# Patient Record
Sex: FEMALE | Race: WHITE | URBAN - METROPOLITAN AREA
[De-identification: names, ages, dates, MRNs, and addresses within clinical notes are randomized per-mention and may not be internally consistent; named-entity substitution may affect disease eponyms.]

---

## 2017-01-16 ENCOUNTER — OFFICE VISIT (OUTPATIENT)
Dept: ORTHOPEDICS | Facility: CLINIC | Age: 82
End: 2017-01-16
Payer: COMMERCIAL

## 2017-01-16 VITALS — BODY MASS INDEX: 36.12 KG/M2 | RESPIRATION RATE: 18 BRPM | HEIGHT: 60 IN | WEIGHT: 184 LBS

## 2017-01-16 DIAGNOSIS — M75.112 INCOMPLETE TEAR OF LEFT ROTATOR CUFF: Primary | ICD-10-CM

## 2017-01-16 DIAGNOSIS — M25.812 SHOULDER IMPINGEMENT, LEFT: ICD-10-CM

## 2017-01-16 PROCEDURE — 20610 DRAIN/INJ JOINT/BURSA W/O US: CPT | Mod: LT | Performed by: ORTHOPAEDIC SURGERY

## 2017-01-16 RX ORDER — TRIAMCINOLONE ACETONIDE 40 MG/ML
80 INJECTION, SUSPENSION INTRA-ARTICULAR; INTRAMUSCULAR ONCE
Qty: 2 ML | Refills: 0 | OUTPATIENT
Start: 2017-01-16 | End: 2017-01-16

## 2017-01-16 NOTE — NURSING NOTE
Chief Complaint   Patient presents with     RECHECK     Left shoulder OA last injection 9/19/16.       Initial Resp 18  Ht 1.524 m (5')  Wt 83.462 kg (184 lb)  BMI 35.94 kg/m2 Estimated body mass index is 35.94 kg/(m^2) as calculated from the following:    Height as of this encounter: 1.524 m (5').    Weight as of this encounter: 83.462 kg (184 lb).  BP completed using cuff size: NA (Not Taken)  Aleah Zafar MA

## 2017-01-16 NOTE — PROGRESS NOTES
Follow up left shoulder impingement, partial rotator cuff tear, osteoarthritis..  Patient desires injection today of left shoulder.  Last injection 9/19/16 in subacromial space with 80 mg kenolog.  Risks, benefits, potential complications and alternatives were discussed.   With the patient's consent, sterile prep was performed of left shoulder.  Left shoulder was injected with Kenalog 80 mg and lidocaine at shoulder subacromial space from the posterolateral approach .  Return to clinic as needed.

## 2017-01-16 NOTE — PROGRESS NOTES
The patient's left shoulder was prepped with betadine solution after verification of allergies. Area approximately 10 cm x 10 cm prepped in a sterile fashion. After injection, betadine removed with soap and water and band-aids applied.    1ml kenalog with 1% lidocaine plain injected into patient's left shoulder by Dr. Mitul Segura  LOT# [id number]  Exp.4/18

## 2017-01-16 NOTE — MR AVS SNAPSHOT
"              After Visit Summary   1/16/2017    Kelly Mansfield    MRN: [id number]           Patient Information     Date Of Birth          6/8/1931        Visit Information        Provider Department      1/16/2017 1:00 PM Mitul Segura MD [facility]        Today's Diagnoses     Incomplete tear of left rotator cuff    -  1     Primary osteoarthritis of left shoulder           Care Instructions    You have been given a \"cortisone\" injection today.  There are two medications in the injection you were given.      One of these medications is lidocaine, which is similar to the numbing agent you receive at the dentist.  You should start to feel the effect of this medication starting a few  minutes after you are injected and it will last for about two hours.  After the lidocaine wears off, the area will feel sore and a small percentage of people actually have a slight increase of pain for the first 24-48 hours after the lidocaine wears off.     The other medication is the \"cortisone\" type medication.  This medication takes effect slowly and should make you more comfortable day by day over the next two weeks.  After the first two weeks, the medication levels off and keeps working over about three months or longer.      You may have the area injected, in general, every three months.  This is the estimated amount of time that the body takes to metabolize the \"cortisone.\"  Getting injections too frequently may result in a softening of the cartilage and cause the joint to actually wear out more quickly.    The most important thing for you to note is how the area feels over the next two hours.  If the correct space was injected, the area should feel much more comfortable over the next two hours.  If the area never gets comfortable over this time period, it is possible that the medication was trapped in some surrounding soft tissue and is not free in the joint.  If this happens, the medication will not " "work long-term.  However, if this occurs we can attempt to re-inject the area sooner that the three months that is normally recommended.  If you report to us that the injection was NOT effective, we will ask you if it worked initially.  Please note how the area feels during the next two hours, as we may be inquiring about this.    To schedule another appointment, call [phone].          Follow-ups after your visit        Your next 10 appointments already scheduled     Jan 16, 2017  1:00 PM   Return Visit with Mitul Segura MD   [facility] ([facility])    [address] [place] [id number] [phone]              Who to contact     If you have questions or need follow up information about today's clinic visit or your schedule please contact [facility] directly at [phone].  Normal or non-critical lab and imaging results will be communicated to you by MyChart, letter or phone within 4 business days after the clinic has received the results. If you do not hear from us within 7 days, please contact the clinic through MyChart or phone. If you have a critical or abnormal lab result, we will notify you by phone as soon as possible.  Submit refill requests through [employer] or call your pharmacy and they will forward the refill request to us. Please allow 3 business days for your refill to be completed.          Additional Information About Your Visit        Spinal Restoration[employer] Information     mPortico lets you send messages to your doctor, view your test results, renew your prescriptions, schedule appointments and more. To sign up, go to [email or web address] . Click on \"Log in\" on the left side of the screen, which will take you to the Welcome page. Then click on \"Sign up Now\" on the right side of the page.     You will be asked to enter the access code listed below, as well as some personal information. Please follow the directions to create your username and " password.     Your access code is: [email or web address]  Expires: 3/2/2017 10:34 AM     Your access code will  in 90 days. If you need help or a new code, please call your [facility] clinic or [phone].        Care EveryWhere ID     This is your Care EveryWhere ID. This could be used by other organizations to access your [facility] medical records  [id number]        Your Vitals Were     Respirations Height BMI (Body Mass Index)             18 1.524 m (5') 35.94 kg/m2          Blood Pressure from Last 3 Encounters:   16 126/82   10/17/16 124/78   10/11/16 138/70    Weight from Last 3 Encounters:   17 83.462 kg (184 lb)   16 83.462 kg (184 lb)   16 83.915 kg (185 lb)              Today, you had the following     No orders found for display       Primary Care Provider Office Phone # Fax #    Tatianna Mota [phone]       [address] [address]        Thank you!     Thank you for choosing [facility]  for your care. Our goal is always to provide you with excellent care. Hearing back from our patients is one way we can continue to improve our services. Please take a few minutes to complete the written survey that you may receive in the mail after your visit with us. Thank you!             Your Updated Medication List - Protect others around you: Learn how to safely use, store and throw away your medicines at www.disposemymeds.org.          This list is accurate as of: 17 12:58 PM.  Always use your most recent med list.                   Brand Name Dispense Instructions for use    albuterol 108 (90 BASE) MCG/ACT Inhaler    PROAIR HFA/PROVENTIL HFA/VENTOLIN HFA    1 Inhaler    Inhale 2 puffs into the lungs every 4 hours as needed       aspirin 81 MG tablet      Take by mouth daily       atorvastatin 20 MG tablet    LIPITOR    90 tablet    TAKE ONE TABLET BY MOUTH EVERY DAY (MUST HAVE FASTING LABS FOR FURTHER REFILLS)        CALCIUM 1200 PO      1800mg daily       celecoxib 100 MG capsule    celeBREX    60 capsule    Take 1 capsule (100 mg) by mouth 2 times daily as needed for moderate pain       cholecalciferol 1000 UNIT tablet    vitamin D     1 TABLET DAILY       clopidogrel 75 MG tablet    PLAVIX    90 tablet    Take 1 tablet (75 mg) by mouth daily       fluticasone 50 MCG/ACT spray    FLONASE    16 g    Spray 2 sprays into both nostrils 2 times daily       fluticasone-salmeterol 250-50 MCG/DOSE diskus inhaler    ADVAIR DISKUS    3 Inhaler    Inhale 1 puff into the lungs 2 times daily       lisinopril 5 MG tablet    PRINIVIL/ZESTRIL    90 tablet    Take 1 tablet (5 mg) by mouth daily       OCUVITE PO      6mg daily       polyethylene glycol powder    MIRALAX/GLYCOLAX     Take 1 capful by mouth daily

## 2017-01-16 NOTE — PATIENT INSTRUCTIONS
"You have been given a \"cortisone\" injection today.  There are two medications in the injection you were given.      One of these medications is lidocaine, which is similar to the numbing agent you receive at the dentist.  You should start to feel the effect of this medication starting a few  minutes after you are injected and it will last for about two hours.  After the lidocaine wears off, the area will feel sore and a small percentage of people actually have a slight increase of pain for the first 24-48 hours after the lidocaine wears off.     The other medication is the \"cortisone\" type medication.  This medication takes effect slowly and should make you more comfortable day by day over the next two weeks.  After the first two weeks, the medication levels off and keeps working over about three months or longer.      You may have the area injected, in general, every three months.  This is the estimated amount of time that the body takes to metabolize the \"cortisone.\"  Getting injections too frequently may result in a softening of the cartilage and cause the joint to actually wear out more quickly.    The most important thing for you to note is how the area feels over the next two hours.  If the correct space was injected, the area should feel much more comfortable over the next two hours.  If the area never gets comfortable over this time period, it is possible that the medication was trapped in some surrounding soft tissue and is not free in the joint.  If this happens, the medication will not work long-term.  However, if this occurs we can attempt to re-inject the area sooner that the three months that is normally recommended.  If you report to us that the injection was NOT effective, we will ask you if it worked initially.  Please note how the area feels during the next two hours, as we may be inquiring about this.    To schedule another appointment, call [phone].    "

## 2017-02-06 ENCOUNTER — TELEPHONE (OUTPATIENT)
Dept: FAMILY MEDICINE | Facility: CLINIC | Age: 82
End: 2017-02-06

## 2017-02-06 DIAGNOSIS — J45.30 MILD PERSISTENT ASTHMA WITHOUT COMPLICATION: Primary | ICD-10-CM

## 2017-02-06 NOTE — TELEPHONE ENCOUNTER
Advair is very high priced on the patients insurance and it looks like the preferred products are symbicort or dulera.  If this is okay, would you please send a prescription for either?  Patient does not want to pay over $200 for Advair.    Thank you  Linnea Garcia [facility]  Phone: [phone]  Fax: [phone]

## 2017-02-17 ENCOUNTER — TELEPHONE (OUTPATIENT)
Dept: FAMILY MEDICINE | Facility: CLINIC | Age: 82
End: 2017-02-17

## 2017-02-17 NOTE — TELEPHONE ENCOUNTER
RN spoke with patient and states she had removed couple of cysts with the surgeon that Dr. Mota referred her to back in April.  Cyst removed from her back of her neck, on her spine, and there is another one in her back again and would like to be removed.  RN informed patient the general surgery referral that patient was referred back in April should be good until April of this year.  Patient states she didn't know that and she will call to schedule an appointment to see the same surgeon.  No further action needed.    Stephen WARD RN, BSN

## 2017-02-17 NOTE — TELEPHONE ENCOUNTER
Pt is looking for a referral to have a cyst removed.      Thank you,   Brandin MARES   [facility]  [phone]

## 2017-02-20 ENCOUNTER — OFFICE VISIT (OUTPATIENT)
Dept: SURGERY | Facility: CLINIC | Age: 82
End: 2017-02-20
Payer: COMMERCIAL

## 2017-02-20 VITALS
HEIGHT: 58 IN | WEIGHT: 180 LBS | HEART RATE: 93 BPM | DIASTOLIC BLOOD PRESSURE: 87 MMHG | SYSTOLIC BLOOD PRESSURE: 155 MMHG | BODY MASS INDEX: 37.79 KG/M2

## 2017-02-20 DIAGNOSIS — L72.3 SEBACEOUS CYST: Primary | ICD-10-CM

## 2017-02-20 PROCEDURE — 99213 OFFICE O/P EST LOW 20 MIN: CPT | Performed by: SURGERY

## 2017-02-20 NOTE — PROGRESS NOTES
General Surgery    HPI:  Patient is a 85 year old female  with complaints of new (possibly recurrent) cyst on her back  Area is getting sore  Has not noticed any drainage  Seems like it is in or near where she had one removed previously  I removed one from the back of her neck last April this area seems to be doing fine    Review Of Systems    Skin: as above  Ears/Nose/Throat: negative  Respiratory: No shortness of breath, dyspnea on exertion, cough, or hemoptysis  Cardiovascular: negative  Gastrointestinal: negative  Genitourinary: negative  Musculoskeletal: negative  Neurologic: negative  Hematologic/Lymphatic/Immunologic: negative  Endocrine: negative      Past Medical History   Diagnosis Date     BMI 45.0-49.9, adult (H)      Diagnostic skin and sensitization tests 2/7/12 RAST all NEGATIVE for environmental allergies.     11/98 skin tests pos. minimally (intradermal only) only dog/DM/M only     DJD (degenerative joint disease)      Endometrial ca (H) 10/2002     clear cell. sees Dr. Janna marcos.     Ex-smoker      HTN (hypertension)      Macular degeneration dx 5/09     lt eye     Mild persistent asthma      Nonallergic rhinitis      2/7/12 RAST all NEGATIVE for environmental allergies.     Osteopenia      Rheumatic fever 59     sccis 06/09     L calf     Tremors      Uterine cancer (H)      s/p hysterectomy       Past Surgical History   Procedure Laterality Date     Colonoscopy  09/02     Hysterectomy, maude  10/02     C nonspecific procedure  57     tailbone cyst removed     C nonspecific procedure       Vein surgery     Phacoemulsification with standard intraocular lens implant  12/2008; 2/2009     right eye; left eye     Appendectomy  age 12     Arthroscopy knee rt/lt  08/99     right     Salpingo oophorectomy,r/l/mayuri  10/02     Salpingo Oophorectomy/BILATERAL     Carpal tunnel surgery Bilateral      Ent surgery       Skin lesions     Ent surgery  2-29-12     Removal lesion back of neck     Hysterectomy, maude   "2002     ca uterus     C total knee arthroplasty Right 6/3/15     Cataract iol, rt/lt         Social History     Social History     Marital status: Single     Spouse name: N/A     Number of children: 5     Years of education: 12     Occupational History     / Retired     1995     Social History Main Topics     Smoking status: Former Smoker     Packs/day: 0.50     Years: 25.00     Quit date: 1/1/1991     Smokeless tobacco: Never Used     Alcohol use No     Drug use: No     Sexual activity: Not Currently     Other Topics Concern     Not on file     Social History Narrative       Current Outpatient Prescriptions   Medication Sig Dispense Refill     mometasone-formoterol (DULERA) 200-5 MCG/ACT oral inhaler Inhale 2 puffs into the lungs 2 times daily 1 Inhaler 11     celecoxib (CELEBREX) 100 MG capsule Take 1 capsule (100 mg) by mouth 2 times daily as needed for moderate pain 60 capsule 1     atorvastatin (LIPITOR) 20 MG tablet TAKE ONE TABLET BY MOUTH EVERY DAY (MUST HAVE FASTING LABS FOR FURTHER REFILLS) 90 tablet 3     lisinopril (PRINIVIL/ZESTRIL) 5 MG tablet Take 1 tablet (5 mg) by mouth daily 90 tablet 3     albuterol (PROAIR HFA/PROVENTIL HFA/VENTOLIN HFA) 108 (90 BASE) MCG/ACT Inhaler Inhale 2 puffs into the lungs every 4 hours as needed 1 Inhaler 6     clopidogrel (PLAVIX) 75 MG tablet Take 1 tablet (75 mg) by mouth daily 90 tablet 3     fluticasone (FLONASE) 50 MCG/ACT nasal spray Spray 2 sprays into both nostrils 2 times daily 16 g 6     polyethylene glycol (MIRALAX/GLYCOLAX) powder Take 1 capful by mouth daily       aspirin 81 MG tablet Take by mouth daily       OCUVITE OR 6mg daily       CALCIUM 1200 OR 1800mg daily       VITAMIN D 1000 UNIT OR TABS 1 TABLET DAILY         Medications and history reviewed    Physical exam:  Vitals: /87  Pulse 93  Ht 1.473 m (4' 10\")  Wt 81.6 kg (180 lb)  BMI 37.62 kg/m2  BMI= Body mass index is 37.62 kg/(m^2).    Constitutional: healthy, alert and no " distress  Head: Normocephalic. No masses, lesions, tenderness or abnormalities  Cardiovascular: negative, PMI normal. No lifts, heaves, or thrills. RRR. No murmurs, clicks gallops or rub  Respiratory: negative, Percussion normal. Good diaphragmatic excursion. Lungs clear  : Deferred  Musculoskeletal: extremities normal- no gross deformities noted, gait normal and normal muscle tone  Skin: mid back with recurrent sebaceous cyst or cysts at previous scar- superior and inferiorly each perhaps 1 cm. scarring there masks borders some  Psychiatric: mentation appears normal and is emotional yet over recent death of son  Hematologic/Lymphatic/Immunologic: Normal cervical lymph nodes    Assessment:     ICD-10-CM    1. Sebaceous cyst L72.3      Plan: Plan for excision of the recurrent cyst/cysts with the old scar so will plan to do in OR to have cautery. Should still be able to do under local. Had issues with vicryl sutures before but did well with the outside prolene last time. Will want to be off asa and plavix week before surgery    Mike Lopez MD

## 2017-02-20 NOTE — MR AVS SNAPSHOT
"              After Visit Summary   2017    Kelly Mansfield    MRN: [id number]           Patient Information     Date Of Birth          1931        Visit Information        Provider Department      2017 9:30 AM Mike Lopez MD [facility]        Today's Diagnoses     Sebaceous cyst    -  1       Follow-ups after your visit        Who to contact     If you have questions or need follow up information about today's clinic visit or your schedule please contact [facility] directly at [phone].  Normal or non-critical lab and imaging results will be communicated to you by Ntractivehart, letter or phone within 4 business days after the clinic has received the results. If you do not hear from us within 7 days, please contact the clinic through Ntractivehart or phone. If you have a critical or abnormal lab result, we will notify you by phone as soon as possible.  Submit refill requests through [employer] or call your pharmacy and they will forward the refill request to us. Please allow 3 business days for your refill to be completed.          Additional Information About Your Visit        MyChart Information     [employer] lets you send messages to your doctor, view your test results, renew your prescriptions, schedule appointments and more. To sign up, go to [email or web address] . Click on \"Log in\" on the left side of the screen, which will take you to the Welcome page. Then click on \"Sign up Now\" on the right side of the page.     You will be asked to enter the access code listed below, as well as some personal information. Please follow the directions to create your username and password.     Your access code is: [email or web address]  Expires: 3/2/2017 10:34 AM     Your access code will  in 90 days. If you need help or a new code, please call your [facility] or [phone].        Care EveryWhere ID     This is your Care EveryWhere ID. This could be used by other " "organizations to access your [facility] medical records  [id number]        Your Vitals Were     Pulse Height BMI (Body Mass Index)             93 1.473 m (4' 10\") 37.62 kg/m2          Blood Pressure from Last 3 Encounters:   02/20/17 155/87   12/02/16 126/82   10/17/16 124/78    Weight from Last 3 Encounters:   02/20/17 81.6 kg (180 lb)   01/16/17 83.5 kg (184 lb)   12/02/16 83.5 kg (184 lb)              Today, you had the following     No orders found for display       Primary Care Provider Office Phone # Fax #    Tatianna Mota [phone]       [address] [address]        Thank you!     Thank you for choosing [facility]  for your care. Our goal is always to provide you with excellent care. Hearing back from our patients is one way we can continue to improve our services. Please take a few minutes to complete the written survey that you may receive in the mail after your visit with us. Thank you!             Your Updated Medication List - Protect others around you: Learn how to safely use, store and throw away your medicines at www.disposemymeds.org.          This list is accurate as of: 2/20/17  9:53 AM.  Always use your most recent med list.                   Brand Name Dispense Instructions for use    albuterol 108 (90 BASE) MCG/ACT Inhaler    PROAIR HFA/PROVENTIL HFA/VENTOLIN HFA    1 Inhaler    Inhale 2 puffs into the lungs every 4 hours as needed       aspirin 81 MG tablet      Take by mouth daily       atorvastatin 20 MG tablet    LIPITOR    90 tablet    TAKE ONE TABLET BY MOUTH EVERY DAY (MUST HAVE FASTING LABS FOR FURTHER REFILLS)       CALCIUM 1200 PO      1800mg daily       celecoxib 100 MG capsule    celeBREX    60 capsule    Take 1 capsule (100 mg) by mouth 2 times daily as needed for moderate pain       cholecalciferol 1000 UNIT tablet    vitamin D     1 TABLET DAILY       clopidogrel 75 MG tablet    PLAVIX    90 tablet    Take " 1 tablet (75 mg) by mouth daily       fluticasone 50 MCG/ACT spray    FLONASE    16 g    Spray 2 sprays into both nostrils 2 times daily       lisinopril 5 MG tablet    PRINIVIL/ZESTRIL    90 tablet    Take 1 tablet (5 mg) by mouth daily       mometasone-formoterol 200-5 MCG/ACT oral inhaler    DULERA    1 Inhaler    Inhale 2 puffs into the lungs 2 times daily       OCUVITE PO      6mg daily       polyethylene glycol powder    MIRALAX/GLYCOLAX     Take 1 capful by mouth daily

## 2017-02-20 NOTE — NURSING NOTE
"Chief Complaint   Patient presents with     Other     cyst on back       Initial /87  Pulse 93  Ht 4' 10\" (1.473 m)  Wt 180 lb (81.6 kg)  BMI 37.62 kg/m2 Estimated body mass index is 37.62 kg/(m^2) as calculated from the following:    Height as of this encounter: 4' 10\" (1.473 m).    Weight as of this encounter: 180 lb (81.6 kg).  Medication Reconciliation: denise Sanabria Cma      "

## 2017-03-02 ENCOUNTER — TRANSFERRED RECORDS (OUTPATIENT)
Dept: HEALTH INFORMATION MANAGEMENT | Facility: CLINIC | Age: 82
End: 2017-03-02

## 2017-03-20 ENCOUNTER — OFFICE VISIT (OUTPATIENT)
Dept: SURGERY | Facility: CLINIC | Age: 82
End: 2017-03-20
Payer: COMMERCIAL

## 2017-03-20 VITALS
WEIGHT: 180 LBS | BODY MASS INDEX: 37.79 KG/M2 | SYSTOLIC BLOOD PRESSURE: 142 MMHG | DIASTOLIC BLOOD PRESSURE: 74 MMHG | HEART RATE: 76 BPM | HEIGHT: 58 IN

## 2017-03-20 DIAGNOSIS — L72.3 SEBACEOUS CYST: Primary | ICD-10-CM

## 2017-03-20 PROCEDURE — 99024 POSTOP FOLLOW-UP VISIT: CPT | Performed by: SURGERY

## 2017-03-20 NOTE — NURSING NOTE
"Chief Complaint   Patient presents with     Surgical Followup       Initial /74  Pulse 76  Ht 4' 10\" (1.473 m)  Wt 180 lb (81.6 kg)  BMI 37.62 kg/m2 Estimated body mass index is 37.62 kg/(m^2) as calculated from the following:    Height as of this encounter: 4' 10\" (1.473 m).    Weight as of this encounter: 180 lb (81.6 kg).  Medication Reconciliation: complete   Hiral Sanabria Cma      "

## 2017-03-20 NOTE — PROGRESS NOTES
"General Surgery Post Op    Pt returns for follow up visit s/p excision recurrent sebaceous cyst on 3/2.    Patient has been doing well, no wound healing issues other than it itches.    Physical exam: Vitals: /74  Pulse 76  Ht 1.473 m (4' 10\")  Wt 81.6 kg (180 lb)  BMI 37.62 kg/m2  BMI= Body mass index is 37.62 kg/(m^2).    Exam:  Constitutional: healthy, alert and no distress  Skin:- Mid back incision healed well    Path:  Final Diagnosis   Skin, back, excision - Ruptured epidermal inclusion cyst.         Assessment:     ICD-10-CM    1. Sebaceous cyst L72.3      Plan: Sutures removed. OK for normal bathing routine. Follow up as needed    Mike Lopez MD      "

## 2017-03-20 NOTE — MR AVS SNAPSHOT
"              After Visit Summary   3/20/2017    Kelly Mansfield    MRN: [id number]           Patient Information     Date Of Birth          1931        Visit Information        Provider Department      3/20/2017 8:30 AM Mike Lopez MD [facility]        Today's Diagnoses     Sebaceous cyst    -  1       Follow-ups after your visit        Who to contact     If you have questions or need follow up information about today's clinic visit or your schedule please contact [facility] directly at [phone].  Normal or non-critical lab and imaging results will be communicated to you by Flippshart, letter or phone within 4 business days after the clinic has received the results. If you do not hear from us within 7 days, please contact the clinic through Flippshart or phone. If you have a critical or abnormal lab result, we will notify you by phone as soon as possible.  Submit refill requests through TreatFeed or call your pharmacy and they will forward the refill request to us. Please allow 3 business days for your refill to be completed.          Additional Information About Your Visit        MyChart Information     TreatFeed lets you send messages to your doctor, view your test results, renew your prescriptions, schedule appointments and more. To sign up, go to [email or web address] . Click on \"Log in\" on the left side of the screen, which will take you to the Welcome page. Then click on \"Sign up Now\" on the right side of the page.     You will be asked to enter the access code listed below, as well as some personal information. Please follow the directions to create your username and password.     Your access code is: [email or web address]  Expires: 2017  8:36 AM     Your access code will  in 90 days. If you need help or a new code, please call your [facility] or [phone].        Care EveryWhere ID     This is your Care EveryWhere ID. This could be used by other " "organizations to access your [place] medical records  [id number]        Your Vitals Were     Pulse Height BMI (Body Mass Index)             76 1.473 m (4' 10\") 37.62 kg/m2          Blood Pressure from Last 3 Encounters:   03/20/17 142/74   02/20/17 155/87   12/02/16 126/82    Weight from Last 3 Encounters:   03/20/17 81.6 kg (180 lb)   02/20/17 81.6 kg (180 lb)   01/16/17 83.5 kg (184 lb)              Today, you had the following     No orders found for display       Primary Care Provider Office Phone # Fax #    Tatianna Mota [phone]       [address] [address]        Thank you!     Thank you for choosing [facility]  for your care. Our goal is always to provide you with excellent care. Hearing back from our patients is one way we can continue to improve our services. Please take a few minutes to complete the written survey that you may receive in the mail after your visit with us. Thank you!             Your Updated Medication List - Protect others around you: Learn how to safely use, store and throw away your medicines at www.disposemymeds.org.          This list is accurate as of: 3/20/17  8:36 AM.  Always use your most recent med list.                   Brand Name Dispense Instructions for use    albuterol 108 (90 BASE) MCG/ACT Inhaler    PROAIR HFA/PROVENTIL HFA/VENTOLIN HFA    1 Inhaler    Inhale 2 puffs into the lungs every 4 hours as needed       aspirin 81 MG tablet      Take by mouth daily       atorvastatin 20 MG tablet    LIPITOR    90 tablet    TAKE ONE TABLET BY MOUTH EVERY DAY (MUST HAVE FASTING LABS FOR FURTHER REFILLS)       CALCIUM 1200 PO      1800mg daily       celecoxib 100 MG capsule    celeBREX    60 capsule    Take 1 capsule (100 mg) by mouth 2 times daily as needed for moderate pain       cholecalciferol 1000 UNIT tablet    vitamin D     1 TABLET DAILY       clopidogrel 75 MG tablet    PLAVIX    90 tablet    Take " 1 tablet (75 mg) by mouth daily       fluticasone 50 MCG/ACT spray    FLONASE    16 g    Spray 2 sprays into both nostrils 2 times daily       lisinopril 5 MG tablet    PRINIVIL/ZESTRIL    90 tablet    Take 1 tablet (5 mg) by mouth daily       mometasone-formoterol 200-5 MCG/ACT oral inhaler    DULERA    1 Inhaler    Inhale 2 puffs into the lungs 2 times daily       OCUVITE PO      6mg daily       polyethylene glycol powder    MIRALAX/GLYCOLAX     Take 1 capful by mouth daily

## 2017-04-03 DIAGNOSIS — J31.0 NONALLERGIC RHINITIS: ICD-10-CM

## 2017-04-03 RX ORDER — FLUTICASONE PROPIONATE 50 MCG
2 SPRAY, SUSPENSION (ML) NASAL 2 TIMES DAILY
Qty: 16 G | Refills: 6 | OUTPATIENT
Start: 2017-04-03

## 2017-04-03 NOTE — TELEPHONE ENCOUNTER
Fluticasone nasal spray      Last Written Prescription Date:  09/09/2015  Last Fill Quantity: 16,   # refills: 6  Last Office Visit with [facility], [facility] or  Health prescribing provider: 12/02/2016  Future Office visit:       Routing refill request to provider for review/approval because:  Drug not on the [facility], [facility] or  Health refill protocol or controlled substance    Nadya Keita  Pharmacy Technician  [facility]  Ph# [phone]  Fax# [phone]

## 2017-04-07 RX ORDER — FLUTICASONE PROPIONATE 50 MCG
2 SPRAY, SUSPENSION (ML) NASAL 2 TIMES DAILY
Qty: 16 G | Refills: 6 | Status: SHIPPED | OUTPATIENT
Start: 2017-04-07 | End: 2019-07-16

## 2017-04-07 NOTE — TELEPHONE ENCOUNTER
Patient has not been seen in the allergy clinic since 9/2015 - will need follow-up visit for further refills through the allergy clinic.

## 2017-05-11 ENCOUNTER — OFFICE VISIT (OUTPATIENT)
Dept: FAMILY MEDICINE | Facility: CLINIC | Age: 82
End: 2017-05-11
Payer: COMMERCIAL

## 2017-05-11 ENCOUNTER — ALLIED HEALTH/NURSE VISIT (OUTPATIENT)
Dept: FAMILY MEDICINE | Facility: CLINIC | Age: 82
End: 2017-05-11
Payer: COMMERCIAL

## 2017-05-11 VITALS
SYSTOLIC BLOOD PRESSURE: 130 MMHG | WEIGHT: 174 LBS | HEART RATE: 90 BPM | TEMPERATURE: 96.4 F | DIASTOLIC BLOOD PRESSURE: 80 MMHG | HEIGHT: 58 IN | OXYGEN SATURATION: 96 % | BODY MASS INDEX: 36.53 KG/M2

## 2017-05-11 VITALS — DIASTOLIC BLOOD PRESSURE: 78 MMHG | HEART RATE: 76 BPM | SYSTOLIC BLOOD PRESSURE: 122 MMHG

## 2017-05-11 DIAGNOSIS — E78.5 HYPERLIPIDEMIA LDL GOAL <100: ICD-10-CM

## 2017-05-11 DIAGNOSIS — I67.9 CEREBROVASCULAR DISEASE: ICD-10-CM

## 2017-05-11 DIAGNOSIS — F43.21 GRIEF: ICD-10-CM

## 2017-05-11 DIAGNOSIS — J45.30 MILD PERSISTENT ASTHMA WITHOUT COMPLICATION: ICD-10-CM

## 2017-05-11 DIAGNOSIS — I10 HYPERTENSION WITH TARGET BLOOD PRESSURE GOAL UNDER 150/90: ICD-10-CM

## 2017-05-11 DIAGNOSIS — R53.83 OTHER FATIGUE: Primary | ICD-10-CM

## 2017-05-11 DIAGNOSIS — Z01.30 BP CHECK: Primary | ICD-10-CM

## 2017-05-11 DIAGNOSIS — E66.01 MORBID OBESITY DUE TO EXCESS CALORIES (H): ICD-10-CM

## 2017-05-11 LAB
ALBUMIN SERPL-MCNC: 3.7 G/DL (ref 3.4–5)
ALP SERPL-CCNC: 90 U/L (ref 40–150)
ALT SERPL W P-5'-P-CCNC: 24 U/L (ref 0–50)
ANION GAP SERPL CALCULATED.3IONS-SCNC: 11 MMOL/L (ref 3–14)
AST SERPL W P-5'-P-CCNC: 18 U/L (ref 0–45)
BASOPHILS # BLD AUTO: 0.2 10E9/L (ref 0–0.2)
BASOPHILS NFR BLD AUTO: 2.2 %
BILIRUB SERPL-MCNC: 0.6 MG/DL (ref 0.2–1.3)
BUN SERPL-MCNC: 15 MG/DL (ref 7–30)
CALCIUM SERPL-MCNC: 9.9 MG/DL (ref 8.5–10.1)
CHLORIDE SERPL-SCNC: 104 MMOL/L (ref 94–109)
CO2 SERPL-SCNC: 27 MMOL/L (ref 20–32)
CREAT SERPL-MCNC: 0.77 MG/DL (ref 0.52–1.04)
DEPRECATED CALCIDIOL+CALCIFEROL SERPL-MC: 61 UG/L (ref 20–75)
DIFFERENTIAL METHOD BLD: NORMAL
EOSINOPHIL # BLD AUTO: 0.3 10E9/L (ref 0–0.7)
EOSINOPHIL NFR BLD AUTO: 3.3 %
ERYTHROCYTE [DISTWIDTH] IN BLOOD BY AUTOMATED COUNT: 13.9 % (ref 10–15)
ERYTHROCYTE [SEDIMENTATION RATE] IN BLOOD BY WESTERGREN METHOD: 13 MM/H (ref 0–30)
GFR SERPL CREATININE-BSD FRML MDRD: 72 ML/MIN/1.7M2
GLUCOSE SERPL-MCNC: 94 MG/DL (ref 70–99)
HCT VFR BLD AUTO: 42.6 % (ref 35–47)
HGB BLD-MCNC: 14.3 G/DL (ref 11.7–15.7)
LYMPHOCYTES # BLD AUTO: 2.1 10E9/L (ref 0.8–5.3)
LYMPHOCYTES NFR BLD AUTO: 26.4 %
MCH RBC QN AUTO: 29.6 PG (ref 26.5–33)
MCHC RBC AUTO-ENTMCNC: 33.6 G/DL (ref 31.5–36.5)
MCV RBC AUTO: 88 FL (ref 78–100)
MONOCYTES # BLD AUTO: 0.7 10E9/L (ref 0–1.3)
MONOCYTES NFR BLD AUTO: 8.7 %
NEUTROPHILS # BLD AUTO: 4.8 10E9/L (ref 1.6–8.3)
NEUTROPHILS NFR BLD AUTO: 59.4 %
PLATELET # BLD AUTO: 183 10E9/L (ref 150–450)
POTASSIUM SERPL-SCNC: 4 MMOL/L (ref 3.4–5.3)
PROT SERPL-MCNC: 7.3 G/DL (ref 6.8–8.8)
RBC # BLD AUTO: 4.83 10E12/L (ref 3.8–5.2)
SODIUM SERPL-SCNC: 142 MMOL/L (ref 133–144)
TSH SERPL DL<=0.005 MIU/L-ACNC: 1.38 MU/L (ref 0.4–4)
VIT B12 SERPL-MCNC: 463 PG/ML (ref 193–986)
WBC # BLD AUTO: 8.1 10E9/L (ref 4–11)

## 2017-05-11 PROCEDURE — 99214 OFFICE O/P EST MOD 30 MIN: CPT | Performed by: FAMILY MEDICINE

## 2017-05-11 PROCEDURE — 84443 ASSAY THYROID STIM HORMONE: CPT | Performed by: FAMILY MEDICINE

## 2017-05-11 PROCEDURE — 80053 COMPREHEN METABOLIC PANEL: CPT | Performed by: FAMILY MEDICINE

## 2017-05-11 PROCEDURE — 85025 COMPLETE CBC W/AUTO DIFF WBC: CPT | Performed by: FAMILY MEDICINE

## 2017-05-11 PROCEDURE — 82607 VITAMIN B-12: CPT | Performed by: FAMILY MEDICINE

## 2017-05-11 PROCEDURE — 36415 COLL VENOUS BLD VENIPUNCTURE: CPT | Performed by: FAMILY MEDICINE

## 2017-05-11 PROCEDURE — 99207 ZZC NO CHARGE NURSE ONLY: CPT | Performed by: FAMILY MEDICINE

## 2017-05-11 PROCEDURE — 85652 RBC SED RATE AUTOMATED: CPT | Performed by: FAMILY MEDICINE

## 2017-05-11 PROCEDURE — 82306 VITAMIN D 25 HYDROXY: CPT | Performed by: FAMILY MEDICINE

## 2017-05-11 NOTE — LETTER
[facility]  [address] [place] [address]. [place] [address]    May 12, 2017    Kelly Mansfield  [address]    [place] [address]          Dear Kelly,  Your lab test are completely normal.  Please make a follow up if you are not better.   Take care.  Enclosed is a copy of your results.     Results for orders placed or performed in visit on 05/11/17   CBC with platelets differential   Result Value Ref Range    WBC 8.1 4.0 - 11.0 10e9/L    RBC Count 4.83 3.8 - 5.2 10e12/L    Hemoglobin 14.3 11.7 - 15.7 g/dL    Hematocrit 42.6 35.0 - 47.0 %    MCV 88 78 - 100 fl    MCH 29.6 26.5 - 33.0 pg    MCHC 33.6 31.5 - 36.5 g/dL    RDW 13.9 10.0 - 15.0 %    Platelet Count 183 150 - 450 10e9/L    Diff Method Automated Method     % Neutrophils 59.4 %    % Lymphocytes 26.4 %    % Monocytes 8.7 %    % Eosinophils 3.3 %    % Basophils 2.2 %    Absolute Neutrophil 4.8 1.6 - 8.3 10e9/L    Absolute Lymphocytes 2.1 0.8 - 5.3 10e9/L    Absolute Monocytes 0.7 0.0 - 1.3 10e9/L    Absolute Eosinophils 0.3 0.0 - 0.7 10e9/L    Absolute Basophils 0.2 0.0 - 0.2 10e9/L   Erythrocyte sedimentation rate auto   Result Value Ref Range    Sed Rate 13 0 - 30 mm/h   TSH with free T4 reflex   Result Value Ref Range    TSH 1.38 0.40 - 4.00 mU/L   Vitamin B12   Result Value Ref Range    Vitamin B12 463 193 - 986 pg/mL   Comprehensive metabolic panel   Result Value Ref Range    Sodium 142 133 - 144 mmol/L    Potassium 4.0 3.4 - 5.3 mmol/L    Chloride 104 94 - 109 mmol/L    Carbon Dioxide 27 20 - 32 mmol/L    Anion Gap 11 3 - 14 mmol/L    Glucose 94 70 - 99 mg/dL    Urea Nitrogen 15 7 - 30 mg/dL    Creatinine 0.77 0.52 - 1.04 mg/dL    GFR Estimate 72 >60 mL/min/1.7m2    GFR Estimate If Black 87 >60 mL/min/1.7m2    Calcium 9.9 8.5 - 10.1 mg/dL    Bilirubin Total 0.6 0.2 - 1.3 mg/dL    Albumin 3.7 3.4 - 5.0 g/dL    Protein Total 7.3 6.8 - 8.8 g/dL    Alkaline Phosphatase 90 40 - 150  U/L    ALT 24 0 - 50 U/L    AST 18 0 - 45 U/L   Vitamin D Deficiency   Result Value Ref Range    Vitamin D Deficiency screening 61 20 - 75 ug/L       If you have any questions or concerns, please call myself or my nurse at [phone].      Sincerely,        Tatianna Mota MD/pb

## 2017-05-11 NOTE — PATIENT INSTRUCTIONS
[facility]    If you have any questions regarding to your visit please contact your care team:       Team Red:   Clinic Hours Telephone Number   Dr. [place]al Arango, NP   7am-7pm  Monday - Thursday   7am-5pm  Fridays  [phone]  (Appointment scheduling available 24/7)    Questions about your visit?   Team Line  [phone]   [facility] [place] [facility] 11am-9pm Monday-Friday Saturday-Sunday- 9am-5pm   [place] - 5pm-9pm Monday-Friday Saturday-Sunday- 9am-5pm  [phone] - [place]   [phone] - [place]       What options do I have for visits at the clinic other than the traditional office visit?  To expand how we care for you, many of our providers are utilizing electronic visits (e-visits) and telephone visits, when medically appropriate, for interactions with their patients rather than a visit in the clinic.   We also offer nurse visits for many medical concerns. Just like any other service, we will bill your insurance company for this type of visit based on time spent on the phone with your provider. Not all insurance companies cover these visits. Please check with your medical insurance if this type of visit is covered. You will be responsible for any charges that are not paid by your insurance.      E-visits via "[employer]:  generally incur a $35.00 fee.  Telephone visits:  Time spent on the phone: *charged based on time that is spent on the phone in increments of 10 minutes. Estimated cost:   5-10 mins $30.00   11-20 mins. $59.00   21-30 mins. $85.00     Use [employer] (secure email communication and access to your chart) to send your primary care provider a message or make an appointment. Ask someone on your Team how to sign up for "[employer].  For a Price Quote for your services, please call our Consumer Price Line at [phone].      As always, Thank you for trusting us with your health care needs!    Sylvia  Luis, CMA

## 2017-05-11 NOTE — NURSING NOTE
"Chief Complaint   Patient presents with     Fatigue     x 2 1/2       Initial /80 (BP Location: Left arm, Patient Position: Chair, Cuff Size: Adult Large)  Pulse 90  Temp 96.4  F (35.8  C) (Oral)  Ht 4' 10\" (1.473 m)  Wt 174 lb (78.9 kg)  SpO2 96%  Breastfeeding? No  BMI 36.37 kg/m2 Estimated body mass index is 36.37 kg/(m^2) as calculated from the following:    Height as of this encounter: 4' 10\" (1.473 m).    Weight as of this encounter: 174 lb (78.9 kg).  Medication Reconciliation: complete   Ligia MARES MA      "

## 2017-05-11 NOTE — PROGRESS NOTES
SUBJECTIVE:                                                    Kelly Mansfield is a 85 year old female who presents to clinic today for the following health issues:        Chief Complaint   Patient presents with     Fatigue     x 2 1/2       Pt says since she had  Excision of Sebaceous cyst in March -she has been Feeling Tired  She sleeps well  She lost  her second son  In December  Pt has been watching her Diet and Trying to Lose weight  Pt has never been checked for sleep apnea  Pt has allergies  seasonal  She is Grieving  Hyperlipidemia Follow-Up      Rate your low fat/cholesterol diet?: good    Taking statin?  Yes, no muscle aches from statin    Other lipid medications/supplements?:  none     Hypertension Follow-up      Outpatient blood pressures are not being checked.    Low Salt Diet: no added salt     Cerebrovascular Follow-up      Patient history: CVA    Residual symptoms: None    Worsened or new symptoms since last visit: No    Daily aspirin use: plavix    Hypertension controlled: Yes       Asthma Follow-Up    Was ACT completed today?    Yes    ACT Total Scores 5/11/2017   ACT TOTAL SCORE (Goal Greater than or Equal to 20) 24   In the past 12 months, how many times did you visit the emergency room for your asthma without being admitted to the hospital? 0   In the past 12 months, how many times were you hospitalized overnight because of your asthma? 0       Recent asthma triggers that patient is dealing with: upper respiratory infections        Problem list and histories reviewed & adjusted, as indicated.  Additional history: as documented    Patient Active Problem List   Diagnosis     Osteopenia     Family history of malignant neoplasm of breast     Hyperlipidemia LDL goal <130     Tendon cyst     Stiffness of joint, not elsewhere classified, forearm     Clear cell adenocarcinoma of uterus (H)     Advanced directives, counseling/discussion     Pseudophakia, ou     Lumbar spinal stenosis     Diagnostic  skin and sensitization tests     Nonallergic rhinitis     Mild persistent asthma     Health Care Home     History of depression     Sebaceous cyst     History of total knee replacement     TIA (transient ischemic attack)     Non morbid obesity due to excess calories     Incomplete tear of left rotator cuff     Primary osteoarthritis of left shoulder     Posterior vitreous detachment, bilateral     Macular degeneration, dry, mild, ou     Hypertension with target blood pressure goal under 150/90     History of TIA (transient ischemic attack) and stroke     Shoulder impingement, left     Past Surgical History:   Procedure Laterality Date     APPENDECTOMY  age 12     ARTHROSCOPY KNEE RT/LT  08/99    right     C NONSPECIFIC PROCEDURE  57    tailbone cyst removed     C NONSPECIFIC PROCEDURE      Vein surgery     C TOTAL KNEE ARTHROPLASTY Right 6/3/15     Carpal tunnel surgery Bilateral      CATARACT IOL, RT/LT       COLONOSCOPY  09/02     ENT SURGERY      Skin lesions     ENT SURGERY  2-29-12    Removal lesion back of neck     HYSTERECTOMY, LEYDA  10/02     HYSTERECTOMY, LEYDA  2002    ca uterus     PHACOEMULSIFICATION WITH STANDARD INTRAOCULAR LENS IMPLANT  12/2008; 2/2009    right eye; left eye     SALPINGO OOPHORECTOMY,R/L/VAHE  10/02    Salpingo Oophorectomy/BILATERAL       Social History   Substance Use Topics     Smoking status: Former Smoker     Packs/day: 0.50     Years: 25.00     Quit date: 1/1/1991     Smokeless tobacco: Never Used     Alcohol use No     Family History   Problem Relation Age of Onset     HEART DISEASE Mother      Migraines Mother      unknown age     Unknown/Adopted Father      Unknown/Adopted Maternal Grandmother      Unknown/Adopted Maternal Grandfather      Unknown/Adopted Paternal Grandmother      Unknown/Adopted Paternal Grandfather      DIABETES Daughter      Breast Cancer Other      dads side     Depression Son      committed suicide 3/2010         Current Outpatient Prescriptions   Medication  Sig Dispense Refill     VITAMIN E NATURAL PO        fluticasone (FLONASE) 50 MCG/ACT spray Spray 2 sprays into both nostrils 2 times daily 16 g 6     mometasone-formoterol (DULERA) 200-5 MCG/ACT oral inhaler Inhale 2 puffs into the lungs 2 times daily 1 Inhaler 11     celecoxib (CELEBREX) 100 MG capsule Take 1 capsule (100 mg) by mouth 2 times daily as needed for moderate pain 60 capsule 1     atorvastatin (LIPITOR) 20 MG tablet TAKE ONE TABLET BY MOUTH EVERY DAY (MUST HAVE FASTING LABS FOR FURTHER REFILLS) 90 tablet 3     lisinopril (PRINIVIL/ZESTRIL) 5 MG tablet Take 1 tablet (5 mg) by mouth daily 90 tablet 3     albuterol (PROAIR HFA/PROVENTIL HFA/VENTOLIN HFA) 108 (90 BASE) MCG/ACT Inhaler Inhale 2 puffs into the lungs every 4 hours as needed 1 Inhaler 6     clopidogrel (PLAVIX) 75 MG tablet Take 1 tablet (75 mg) by mouth daily 90 tablet 3     polyethylene glycol (MIRALAX/GLYCOLAX) powder Take 1 capful by mouth daily       aspirin 81 MG tablet Take by mouth daily       OCUVITE OR 6mg daily       CALCIUM 1200 OR 1800mg daily       VITAMIN D 1000 UNIT OR TABS 1 TABLET DAILY       No Known Allergies  Recent Labs   Lab Test  10/24/16   0950  12/01/15   1112  10/06/15   0931   11/28/14   1111  08/04/14   1151   11/27/13   1032   11/19/12   0843   04/12/11   1532   11/16/09   1055   A1C   --    --    --    --    --    --    --    --    --    --    --    --    --   6.0   LDL  86   --   84   --   86   --    --   86   --   115   < >   --    < >  131*   HDL  72   --   55   --   59   --    --   45*   --   48*   < >   --    < >  57   TRIG  75   --   116   --   211*   --    --   174*   --   236*   < >   --    < >  177*   ALT   --    --    --    --   22   --    --   29   --   31   < >   --    < >  19   CR  0.78  0.94   --    < >  0.80   --    < >  0.71   < >  0.77   < >  0.71   < >  0.79   GFRESTIMATED  70  57*   --    < >  68   --    < >  79   < >  72   < >  79   < >  70   GFRESTBLACK  84  69   --    < >  83   --    < >  >90  "  < >  87   < >  >90   < >  85   POTASSIUM  4.5  4.2   --    < >  3.6   --    < >  4.0   < >  4.1   < >  4.1   < >  4.6   TSH   --    --    --    --    --   3.01   --    --    --    --    --   2.40   --    --     < > = values in this interval not displayed.      BP Readings from Last 3 Encounters:   05/11/17 122/78   05/11/17 130/80   03/20/17 142/74    Wt Readings from Last 3 Encounters:   05/11/17 174 lb (78.9 kg)   03/20/17 180 lb (81.6 kg)   02/20/17 180 lb (81.6 kg)                  Labs reviewed in EPIC    Reviewed and updated as needed this visit by clinical staff       Reviewed and updated as needed this visit by Provider         ROS:  C: NEGATIVE for fever, chills,  Pt ahs been trying to lose weight by watching Diet  INTEGUMENTARY/SKIN: NEGATIVE for worrisome rashes, moles or lesions  E/M: NEGATIVE for ear, mouth and throat problems  R: NEGATIVE for significant cough or SOB  CV: NEGATIVE for chest pain, palpitations or peripheral edema  GI: NEGATIVE for nausea, abdominal pain, heartburn, or change in bowel habits  MUSCULOSKELETAL: NEGATIVE for significant arthralgias or myalgia  ROS otherwise negative    OBJECTIVE:                                                    /80 (BP Location: Left arm, Patient Position: Chair, Cuff Size: Adult Large)  Pulse 90  Temp 96.4  F (35.8  C) (Oral)  Ht 4' 10\" (1.473 m)  Wt 174 lb (78.9 kg)  SpO2 96%  Breastfeeding? No  BMI 36.37 kg/m2  Body mass index is 36.37 kg/(m^2).  GENERAL: healthy, alert and no distress  EYES: Eyes grossly normal to inspection, PERRL and conjunctivae and sclerae normal  HENT: ear canals and TM's normal, nose and mouth without ulcers or lesions  NECK: no adenopathy, no asymmetry, masses, or scars and thyroid normal to palpation  RESP: lungs clear to auscultation - no rales, rhonchi or wheezes  CV: regular rate and rhythm, normal S1 S2, no S3 or S4, no murmur, click or rub, no peripheral edema and peripheral pulses strong  ABDOMEN: soft, " nontender, no hepatosplenomegaly, no masses and bowel sounds normal  MS: no gross musculoskeletal defects noted, no edema    Diagnostic Test Results:  Results for orders placed or performed in visit on 05/11/17 (from the past 24 hour(s))   CBC with platelets differential   Result Value Ref Range    WBC 8.1 4.0 - 11.0 10e9/L    RBC Count 4.83 3.8 - 5.2 10e12/L    Hemoglobin 14.3 11.7 - 15.7 g/dL    Hematocrit 42.6 35.0 - 47.0 %    MCV 88 78 - 100 fl    MCH 29.6 26.5 - 33.0 pg    MCHC 33.6 31.5 - 36.5 g/dL    RDW 13.9 10.0 - 15.0 %    Platelet Count 183 150 - 450 10e9/L    Diff Method Automated Method     % Neutrophils 59.4 %    % Lymphocytes 26.4 %    % Monocytes 8.7 %    % Eosinophils 3.3 %    % Basophils 2.2 %    Absolute Neutrophil 4.8 1.6 - 8.3 10e9/L    Absolute Lymphocytes 2.1 0.8 - 5.3 10e9/L    Absolute Monocytes 0.7 0.0 - 1.3 10e9/L    Absolute Eosinophils 0.3 0.0 - 0.7 10e9/L    Absolute Basophils 0.2 0.0 - 0.2 10e9/L        ASSESSMENT/PLAN:                                                        1. Other fatigue  SEE EPIC care orders    - VITAMIN E NATURAL PO;   - CBC with platelets differential  - Erythrocyte sedimentation rate auto  - TSH with free T4 reflex  - Vitamin B12  - Comprehensive metabolic panel  - Vitamin D Deficiency    2. Hyperlipidemia LDL goal <130  Stable     3. Cerebrovascular disease  On   Plavix  4. Hypertension with target blood pressure goal under 150/90  controlled    5. Grief  Lost son in December  Asthma is stable     Follow up if all labs are normal and Not better in 1-2 weeks  Tatianna Mota MD  [facility]

## 2017-05-11 NOTE — MR AVS SNAPSHOT
"              After Visit Summary   2017    Kelly Mansfield    MRN: [id number]           Patient Information     Date Of Birth          1931        Visit Information        Provider Department      2017 9:22 AM Tatianna [facility]ota MD [facility]        Today's Diagnoses     BP check    -  1       Follow-ups after your visit        Who to contact     If you have questions or need follow up information about today's clinic visit or your schedule please contact [facility] directly at [phone].  Normal or non-critical lab and imaging results will be communicated to you by MyChart, letter or phone within 4 business days after the clinic has received the results. If you do not hear from us within 7 days, please contact the clinic through [email or web address] or phone. If you have a critical or abnormal lab result, we will notify you by phone as soon as possible.  Submit refill requests through Magazino or call your pharmacy and they will forward the refill request to us. Please allow 3 business days for your refill to be completed.          Additional Information About Your Visit        MyChart Information     Magazino lets you send messages to your doctor, view your test results, renew your prescriptions, schedule appointments and more. To sign up, go to [email or web address] . Click on \"Log in\" on the left side of the screen, which will take you to the Welcome page. Then click on \"Sign up Now\" on the right side of the page.     You will be asked to enter the access code listed below, as well as some personal information. Please follow the directions to create your username and password.     Your access code is: [email or web address]  Expires: 2017  8:36 AM     Your access code will  in 90 days. If you need help or a new code, please call your [facility] or [phone].        Care EveryWhere ID     This is your Care EveryWhere ID. This could be used by other organizations to access " your [place] medical records  [id number]        Your Vitals Were     Pulse                   76            Blood Pressure from Last 3 Encounters:   05/11/17 122/78   05/11/17 130/80   03/20/17 142/74    Weight from Last 3 Encounters:   05/11/17 174 lb (78.9 kg)   03/20/17 180 lb (81.6 kg)   02/20/17 180 lb (81.6 kg)              Today, you had the following     No orders found for display       Primary Care Provider Office Phone # Fax #    Tatianna Mota [phone]       [facility] [address] [place] [address]        Thank you!     Thank you for choosing [facility]  for your care. Our goal is always to provide you with excellent care. Hearing back from our patients is one way we can continue to improve our services. Please take a few minutes to complete the written survey that you may receive in the mail after your visit with us. Thank you!             Your Updated Medication List - Protect others around you: Learn how to safely use, store and throw away your medicines at www.disposemymeds.org.          This list is accurate as of: 5/11/17 11:59 PM.  Always use your most recent med list.                   Brand Name Dispense Instructions for use    albuterol 108 (90 BASE) MCG/ACT Inhaler    PROAIR HFA/PROVENTIL HFA/VENTOLIN HFA    1 Inhaler    Inhale 2 puffs into the lungs every 4 hours as needed       aspirin 81 MG tablet      Take by mouth daily       atorvastatin 20 MG tablet    LIPITOR    90 tablet    TAKE ONE TABLET BY MOUTH EVERY DAY (MUST HAVE FASTING LABS FOR FURTHER REFILLS)       CALCIUM 1200 PO      1800mg daily       celecoxib 100 MG capsule    celeBREX    60 capsule    Take 1 capsule (100 mg) by mouth 2 times daily as needed for moderate pain       cholecalciferol 1000 UNIT tablet    vitamin D     1 TABLET DAILY       clopidogrel 75 MG tablet    PLAVIX    90 tablet    Take 1 tablet (75 mg) by mouth daily       fluticasone 50 MCG/ACT spray     FLONASE    16 g    Spray 2 sprays into both nostrils 2 times daily       lisinopril 5 MG tablet    PRINIVIL/ZESTRIL    90 tablet    Take 1 tablet (5 mg) by mouth daily       mometasone-formoterol 200-5 MCG/ACT oral inhaler    DULERA    1 Inhaler    Inhale 2 puffs into the lungs 2 times daily       OCUVITE PO      6mg daily       polyethylene glycol powder    MIRALAX/GLYCOLAX     Take 1 capful by mouth daily       VITAMIN E NATURAL PO

## 2017-05-11 NOTE — Clinical Note
TERRI blood pressure checked at [facility] 122/78 pulse 76, pt states she has been very tired/fatigued since her surgery 3-3-17. Her bp results at home with her wrist monitor are 116/40. Advised pt to make an appt with provider

## 2017-05-11 NOTE — PROGRESS NOTES
Kelly Mansfield is enrolled/participating in the retail pharmacy Blood Pressure Goals Achievement Program (BPGAP).  Kelly Mansfield was evaluated at [facility] on May 11, 2017 at which time her blood pressure was:    BP Readings from Last 3 Encounters:   05/11/17 122/78   03/20/17 142/74   02/20/17 155/87     Reviewed lifestyle modifications for blood pressure control and reduction: including making healthy food choices, managing weight, getting regular exercise, smoking cessation, reducing alcohol consumption, monitoring blood pressure regularly.     Kelly Mansfield is experiencing symtoms: (tired) patient states wrist bp monitor at home results are low i.e. 116/40    Follow-Up:bp at goal, but pt is experiencing unusual fatigue/tiredness    Recommendation to Provider:Recommend pt make appt to provider    Completed by:Carey Gallardo RPh  [facility]  Phone [phone]  Fax      [phone]

## 2017-05-11 NOTE — MR AVS SNAPSHOT
After Visit Summary   5/11/2017    Kelly Mansfield    MRN: [id number]           Patient Information     Date Of Birth          6/8/1931        Visit Information        Provider Department      5/11/2017 9:20 AM Tatianna Mota MD [facility]        Today's Diagnoses     Need for prophylactic vaccination against Streptococcus pneumoniae (pneumococcus)    -  1    Other fatigue          Care Instructions    [facility]    If you have any questions regarding to your visit please contact your care team:       Team Red:   Clinic Hours Telephone Number   Dr. Opal Arango, NP   7am-7pm  Monday - Thursday   7am-5pm  Fridays  [phone]  (Appointment scheduling available 24/7)    Questions about your visit?   Team Line  [phone]   [facility] [place] [facility] 11am-9pm Monday-Friday Saturday-Sunday- 9am-5pm   [place] - 5pm-9pm Monday-Friday Saturday-Sunday- 9am-5pm  [phone] - [facility]  [phone] - [place]       What options do I have for visits at the clinic other than the traditional office visit?  To expand how we care for you, many of our providers are utilizing electronic visits (e-visits) and telephone visits, when medically appropriate, for interactions with their patients rather than a visit in the clinic.   We also offer nurse visits for many medical concerns. Just like any other service, we will bill your insurance company for this type of visit based on time spent on the phone with your provider. Not all insurance companies cover these visits. Please check with your medical insurance if this type of visit is covered. You will be responsible for any charges that are not paid by your insurance.      E-visits via ClickFacts:  generally incur a $35.00 fee.  Telephone visits:  Time spent on the phone: *charged based on time that is spent on the phone in increments of 10 minutes.  "Estimated cost:   5-10 mins $30.00   11-20 mins. $59.00   21-30 mins. $85.00     Use Founder[employer] (secure email communication and access to your chart) to send your primary care provider a message or make an appointment. Ask someone on your Team how to sign up for Founder[employer].  For a Price Quote for your services, please call our [place] Price Line at [phone].      As always, Thank you for trusting us with your health care needs!    Sylvia Smith [facility]          Follow-ups after your visit        Who to contact     If you have questions or need follow up information about today's clinic visit or your schedule please contact [facility] directly at [phone].  Normal or non-critical lab and imaging results will be communicated to you by FounderSynchart, letter or phone within 4 business days after the clinic has received the results. If you do not hear from us within 7 days, please contact the clinic through FounderSyn[employer] or phone. If you have a critical or abnormal lab result, we will notify you by phone as soon as possible.  Submit refill requests through [employer] or call your pharmacy and they will forward the refill request to us. Please allow 3 business days for your refill to be completed.          Additional Information About Your Visit        FounderSynchart Information     [employer] lets you send messages to your doctor, view your test results, renew your prescriptions, schedule appointments and more. To sign up, go to [email or web address] . Click on \"Log in\" on the left side of the screen, which will take you to the Welcome page. Then click on \"Sign up Now\" on the right side of the page.     You will be asked to enter the access code listed below, as well as some personal information. Please follow the directions to create your username and password.     Your access code is: [email or web address]  Expires: 2017  8:36 AM     Your access code will  in 90 days. If you need help or a new code, please call your " "[facility] or [phone].        Care EveryWhere ID     This is your Care EveryWhere ID. This could be used by other organizations to access your [place] medical records  [id number]        Your Vitals Were     Pulse Temperature Height Pulse Oximetry Breastfeeding? BMI (Body Mass Index)    90 96.4  F (35.8  C) (Oral) 4' 10\" (1.473 m) 96% No 36.37 kg/m2       Blood Pressure from Last 3 Encounters:   05/11/17 122/78   05/11/17 130/80   03/20/17 142/74    Weight from Last 3 Encounters:   05/11/17 174 lb (78.9 kg)   03/20/17 180 lb (81.6 kg)   02/20/17 180 lb (81.6 kg)              We Performed the Following     CBC with platelets differential     Comprehensive metabolic panel     Erythrocyte sedimentation rate auto     TSH with free T4 reflex     Vitamin B12     Vitamin D Deficiency        Primary Care Provider Office Phone # Fax #    Tatianna Mota [phone]       [place] [id number] [facility] [address]        Thank you!     Thank you for choosing [facility]  for your care. Our goal is always to provide you with excellent care. Hearing back from our patients is one way we can continue to improve our services. Please take a few minutes to complete the written survey that you may receive in the mail after your visit with us. Thank you!             Your Updated Medication List - Protect others around you: Learn how to safely use, store and throw away your medicines at www.disposemymeds.org.          This list is accurate as of: 5/11/17 10:53 AM.  Always use your most recent med list.                   Brand Name Dispense Instructions for use    albuterol 108 (90 BASE) MCG/ACT Inhaler    PROAIR HFA/PROVENTIL HFA/VENTOLIN HFA    1 Inhaler    Inhale 2 puffs into the lungs every 4 hours as needed       aspirin 81 MG tablet      Take by mouth daily       atorvastatin 20 MG tablet    LIPITOR    90 tablet    TAKE ONE TABLET BY MOUTH EVERY DAY (MUST HAVE FASTING " LABS FOR FURTHER REFILLS)       CALCIUM 1200 PO      1800mg daily       celecoxib 100 MG capsule    celeBREX    60 capsule    Take 1 capsule (100 mg) by mouth 2 times daily as needed for moderate pain       cholecalciferol 1000 UNIT tablet    vitamin D     1 TABLET DAILY       clopidogrel 75 MG tablet    PLAVIX    90 tablet    Take 1 tablet (75 mg) by mouth daily       fluticasone 50 MCG/ACT spray    FLONASE    16 g    Spray 2 sprays into both nostrils 2 times daily       lisinopril 5 MG tablet    PRINIVIL/ZESTRIL    90 tablet    Take 1 tablet (5 mg) by mouth daily       mometasone-formoterol 200-5 MCG/ACT oral inhaler    DULERA    1 Inhaler    Inhale 2 puffs into the lungs 2 times daily       OCUVITE PO      6mg daily       polyethylene glycol powder    MIRALAX/GLYCOLAX     Take 1 capful by mouth daily       VITAMIN E NATURAL PO

## 2017-05-12 ASSESSMENT — ASTHMA QUESTIONNAIRES: ACT_TOTALSCORE: 24

## 2017-05-25 ENCOUNTER — OFFICE VISIT (OUTPATIENT)
Dept: ORTHOPEDICS | Facility: CLINIC | Age: 82
End: 2017-05-25
Payer: COMMERCIAL

## 2017-05-25 VITALS — RESPIRATION RATE: 18 BRPM | TEMPERATURE: 98.4 F | HEIGHT: 58 IN | WEIGHT: 178 LBS | BODY MASS INDEX: 37.36 KG/M2

## 2017-05-25 DIAGNOSIS — M19.012 PRIMARY OSTEOARTHRITIS OF LEFT SHOULDER: Primary | ICD-10-CM

## 2017-05-25 DIAGNOSIS — M75.112 INCOMPLETE TEAR OF LEFT ROTATOR CUFF: ICD-10-CM

## 2017-05-25 DIAGNOSIS — M25.812 SHOULDER IMPINGEMENT, LEFT: ICD-10-CM

## 2017-05-25 PROCEDURE — 20610 DRAIN/INJ JOINT/BURSA W/O US: CPT | Mod: LT | Performed by: ORTHOPAEDIC SURGERY

## 2017-05-25 RX ORDER — TRIAMCINOLONE ACETONIDE 40 MG/ML
80 INJECTION, SUSPENSION INTRA-ARTICULAR; INTRAMUSCULAR ONCE
Qty: 2 ML | Refills: 0 | OUTPATIENT
Start: 2017-05-25 | End: 2017-05-25

## 2017-05-25 NOTE — MR AVS SNAPSHOT
After Visit Summary   5/25/2017    Kelly Mansfield    MRN: [id number]           Patient Information     Date Of Birth          6/8/1931        Visit Information        Provider Department      5/25/2017 1:30 PM Mitul Segura MD [facility]        Care Instructions    You have had a steroid injection today.  For the first 2 hours there will likely be some numbing in the joint from the lidocaine.  This is a good sign, indicating that the injection is in the right place.  In 2 hours the lidocaine will wear off, and the joint will hurt like you had a shot.  Each day the cortisone makes it feel better.  It reaches peak effect in 2 weeks.  We expect it to last for 3 months.  You may resume regular activity when you feel ready.  If you are diabetic, your glucoses will be quite high for several days.            Follow-ups after your visit        Your next 10 appointments already scheduled     May 25, 2017  1:30 PM CDT   Return Visit with Mitul Segura MD   [facility] ([facility]    [address] [place] [address]   [phone]            Aug 10, 2017 10:00 AM CDT   New Visit with Barrington Pace MD   [facility] ([facility]    [address] [facility] [address]   [phone]              Who to contact     If you have questions or need follow up information about today's clinic visit or your schedule please contact [facility] directly at [phone].  Normal or non-critical lab and imaging results will be communicated to you by MyChart, letter or phone within 4 business days after the clinic has received the results. If you do not hear from us within 7 days, please contact the clinic through [employer] or phone. If you have a critical or abnormal lab result, we will notify you by phone as soon as possible.  Submit refill requests through [employer] or call your pharmacy and they will  "forward the refill request to us. Please allow 3 business days for your refill to be completed.          Additional Information About Your Visit        MyChart Information     [employer] lets you send messages to your doctor, view your test results, renew your prescriptions, schedule appointments and more. To sign up, go to [email or web address] . Click on \"Log in\" on the left side of the screen, which will take you to the Welcome page. Then click on \"Sign up Now\" on the right side of the page.     You will be asked to enter the access code listed below, as well as some personal information. Please follow the directions to create your username and password.     Your access code is: [email or web address]  Expires: 2017  8:36 AM     Your access code will  in 90 days. If you need help or a new code, please call your [place] clinic or [phone].        Care EveryWhere ID     This is your Care EveryWhere ID. This could be used by other organizations to access your [place] medical records  [id number]        Your Vitals Were     Temperature Respirations Height BMI (Body Mass Index)          98.4  F (36.9  C) 18 1.473 m (4' 10\") 37.2 kg/m2         Blood Pressure from Last 3 Encounters:   17 122/78   17 130/80   17 142/74    Weight from Last 3 Encounters:   17 80.7 kg (178 lb)   17 78.9 kg (174 lb)   17 81.6 kg (180 lb)              Today, you had the following     No orders found for display       Primary Care Provider Office Phone # Fax #    Tatianna Mota [phone]       [address] [address]        Thank you!     Thank you for choosing [facility]  for your care. Our goal is always to provide you with excellent care. Hearing back from our patients is one way we can continue to improve our services. Please take a few minutes to complete the written survey that you may receive in the mail after your visit " with us. Thank you!             Your Updated Medication List - Protect others around you: Learn how to safely use, store and throw away your medicines at www.disposemymeds.org.          This list is accurate as of: 5/25/17  1:29 PM.  Always use your most recent med list.                   Brand Name Dispense Instructions for use    albuterol 108 (90 BASE) MCG/ACT Inhaler    PROAIR HFA/PROVENTIL HFA/VENTOLIN HFA    1 Inhaler    Inhale 2 puffs into the lungs every 4 hours as needed       aspirin 81 MG tablet      Take by mouth daily       atorvastatin 20 MG tablet    LIPITOR    90 tablet    TAKE ONE TABLET BY MOUTH EVERY DAY (MUST HAVE FASTING LABS FOR FURTHER REFILLS)       CALCIUM 1200 PO      1800mg daily       celecoxib 100 MG capsule    celeBREX    60 capsule    Take 1 capsule (100 mg) by mouth 2 times daily as needed for moderate pain       cholecalciferol 1000 UNIT tablet    vitamin D     1 TABLET DAILY       clopidogrel 75 MG tablet    PLAVIX    90 tablet    Take 1 tablet (75 mg) by mouth daily       fluticasone 50 MCG/ACT spray    FLONASE    16 g    Spray 2 sprays into both nostrils 2 times daily       lisinopril 5 MG tablet    PRINIVIL/ZESTRIL    90 tablet    Take 1 tablet (5 mg) by mouth daily       mometasone-formoterol 200-5 MCG/ACT oral inhaler    DULERA    1 Inhaler    Inhale 2 puffs into the lungs 2 times daily       OCUVITE PO      6mg daily       polyethylene glycol powder    MIRALAX/GLYCOLAX     Take 1 capful by mouth daily       VITAMIN E NATURAL PO

## 2017-05-25 NOTE — LETTER
5/25/2017       RE: Kelly Mansfield  [address]   [place] [address]           Dear Colleague,    Thank you for referring your patient, Kelly Mansfield, to the [facility]. Please see a copy of my visit note below.    The patient's left and right shoulders were prepped with betadine solution after verification of allergies. Area approximately 10 cm x 10 cm prepped in a sterile fashion. After injection, betadine removed with soap and water and band-aids applied.    1ml kenalog with 1% lidocaine plain injected into each shoulder (for a total of 2 mL kenalog) by Dr. Mitul Segura  LOT# [id number]  Exp. 06/2018    JENNIFER YbarraC  Supervising physician: Mitul Segura MD  Dept. of Orthopedics  [facility]          Follow up left shoulder impingement, partial rotator cuff tear, osteoarthritis..  Patient desires injection today of left shoulder.  Last injection 1/16/17 in subacromial space with 80 mg kenolog worked well.  Risks, benefits, potential complications and alternatives were discussed.   With the patient's consent, sterile prep was performed of left shoulder.  Left shoulder was injected with Kenalog 80 mg and lidocaine at shoulder subacromial space from the posterolateral approach .  Return to clinic as needed.      Again, thank you for allowing me to participate in the care of your patient.        Sincerely,              Mitul Segura MD

## 2017-05-25 NOTE — PROGRESS NOTES
Follow up left shoulder impingement, partial rotator cuff tear, osteoarthritis..  Patient desires injection today of left shoulder.  Last injection 1/16/17 in subacromial space with 80 mg kenolog worked well.  Risks, benefits, potential complications and alternatives were discussed.   With the patient's consent, sterile prep was performed of left shoulder.  Left shoulder was injected with Kenalog 80 mg and lidocaine at shoulder subacromial space from the posterolateral approach .  Return to clinic as needed.

## 2017-05-25 NOTE — NURSING NOTE
"Chief Complaint   Patient presents with     RECHECK     Follow up left shoulder impingement, partial rotator cuff tear, osteoarthritis. Last injection 1/6/17.       Initial Temp 98.4  F (36.9  C)  Resp 18  Ht 1.473 m (4' 10\")  Wt 80.7 kg (178 lb)  BMI 37.2 kg/m2 Estimated body mass index is 37.2 kg/(m^2) as calculated from the following:    Height as of this encounter: 1.473 m (4' 10\").    Weight as of this encounter: 80.7 kg (178 lb).  Medication Reconciliation: complete   Aleah Zafar MA      "

## 2017-05-25 NOTE — PROGRESS NOTES
The patient's left and right shoulders were prepped with betadine solution after verification of allergies. Area approximately 10 cm x 10 cm prepped in a sterile fashion. After injection, betadine removed with soap and water and band-aids applied.    1ml kenalog with 1% lidocaine plain injected into each shoulder (for a total of 2 mL kenalog) by Dr. Mitul Segura  LOT# [id number]  Exp. 06/2018    Trung Gregory PA-C  Supervising physician: Mitul Segura MD  Dept. of Orthopedics  [facility]

## 2017-07-27 ENCOUNTER — OFFICE VISIT (OUTPATIENT)
Dept: ORTHOPEDICS | Facility: CLINIC | Age: 82
End: 2017-07-27
Payer: COMMERCIAL

## 2017-07-27 VITALS — HEIGHT: 58 IN | RESPIRATION RATE: 18 BRPM | BODY MASS INDEX: 37.36 KG/M2 | WEIGHT: 178 LBS | TEMPERATURE: 98.5 F

## 2017-07-27 DIAGNOSIS — M25.812 SHOULDER IMPINGEMENT, LEFT: ICD-10-CM

## 2017-07-27 DIAGNOSIS — M19.012 PRIMARY OSTEOARTHRITIS OF LEFT SHOULDER: Primary | ICD-10-CM

## 2017-07-27 PROCEDURE — 20610 DRAIN/INJ JOINT/BURSA W/O US: CPT | Mod: LT | Performed by: ORTHOPAEDIC SURGERY

## 2017-07-27 RX ORDER — TRIAMCINOLONE ACETONIDE 40 MG/ML
40 INJECTION, SUSPENSION INTRA-ARTICULAR; INTRAMUSCULAR ONCE
Qty: 1 ML | Refills: 0 | OUTPATIENT
Start: 2017-07-27 | End: 2017-07-27

## 2017-07-27 NOTE — PROGRESS NOTES
Follow up left shoulder impingement, partial rotator cuff tear, osteoarthritis..  Patient desires injection today of left shoulder.  Injection 1/16/17, 5/25/17 in subacromial space with 80 mg kenolog worked well.  Risks, benefits, potential complications and alternatives were discussed.   With the patient's consent, sterile prep was performed of left shoulder.  Left shoulder was injected with Kenalog 80 mg and lidocaine at shoulder subacromial space from the posterolateral approach .  Return to clinic as needed.

## 2017-07-27 NOTE — LETTER
7/27/2017         RE: Kelly Mansfield  [address]   [place] [address]        Dear Colleague,    Thank you for referring your patient, Kelly Mansfield, to the [facility]. Please see a copy of my visit note below.    The patient's left shoulder was prepped with betadine solution after verification of allergies. Area approximately 10 cm x 10 cm prepped in a sterile fashion. After injection, betadine removed with soap and water and band-aids applied.    2ml kenalog with 1% lidocaine plain injected into patient's left shoulder by Dr. Mitul Segura  LOT# [id number]  Exp. 06/2018    Trung Gregory PA-C  Supervising physician: Mitul Segura MD  Dept. of Orthopedics  [facility]          Follow up left shoulder impingement, partial rotator cuff tear, osteoarthritis..  Patient desires injection today of left shoulder.  Injection 1/16/17, 5/25/17 in subacromial space with 80 mg kenolog worked well.  Risks, benefits, potential complications and alternatives were discussed.   With the patient's consent, sterile prep was performed of left shoulder.  Left shoulder was injected with Kenalog 80 mg and lidocaine at shoulder subacromial space from the posterolateral approach .  Return to clinic as needed.      Again, thank you for allowing me to participate in the care of your patient.        Sincerely,        Mitul Segura MD

## 2017-07-27 NOTE — PROGRESS NOTES
The patient's left shoulder was prepped with betadine solution after verification of allergies. Area approximately 10 cm x 10 cm prepped in a sterile fashion. After injection, betadine removed with soap and water and band-aids applied.    2ml kenalog with 1% lidocaine plain injected into patient's left shoulder by Dr. Mitul Segura  LOT# [id number]  Exp. 06/2018    JENNIFER YbarraC  Supervising physician: Mitul Segura MD  Dept. of Orthopedics  [facility]

## 2017-07-27 NOTE — NURSING NOTE
"Chief Complaint   Patient presents with     RECHECK     Left shoulder impingement on 5/25/17.       Initial Temp 98.5  F (36.9  C)  Resp 18  Ht 1.473 m (4' 10\")  Wt 80.7 kg (178 lb)  BMI 37.2 kg/m2 Estimated body mass index is 37.2 kg/(m^2) as calculated from the following:    Height as of this encounter: 1.473 m (4' 10\").    Weight as of this encounter: 80.7 kg (178 lb).  Medication Reconciliation: complete     Aleah Zafar MA      "

## 2017-08-10 ENCOUNTER — OFFICE VISIT (OUTPATIENT)
Dept: OPHTHALMOLOGY | Facility: CLINIC | Age: 82
End: 2017-08-10
Payer: COMMERCIAL

## 2017-08-10 DIAGNOSIS — Z96.1 PSEUDOPHAKIA: ICD-10-CM

## 2017-08-10 DIAGNOSIS — H43.813 POSTERIOR VITREOUS DETACHMENT, BILATERAL: ICD-10-CM

## 2017-08-10 DIAGNOSIS — Z01.01 ENCOUNTER FOR EXAMINATION OF EYES AND VISION WITH ABNORMAL FINDINGS: Primary | ICD-10-CM

## 2017-08-10 DIAGNOSIS — H35.30 MACULAR DEGENERATION: ICD-10-CM

## 2017-08-10 DIAGNOSIS — H52.4 PRESBYOPIA: ICD-10-CM

## 2017-08-10 PROCEDURE — 92014 COMPRE OPH EXAM EST PT 1/>: CPT | Performed by: OPHTHALMOLOGY

## 2017-08-10 PROCEDURE — 92015 DETERMINE REFRACTIVE STATE: CPT | Performed by: OPHTHALMOLOGY

## 2017-08-10 ASSESSMENT — TONOMETRY
OS_IOP_MMHG: 18
IOP_METHOD: APPLANATION
OD_IOP_MMHG: 19

## 2017-08-10 ASSESSMENT — REFRACTION_WEARINGRX
OS_SPHERE: -1.50
OD_ADD: +3.00
OD_CYLINDER: +2.00
SPECS_TYPE: TRIFOCAL
OD_SPHERE: -1.25
OS_AXIS: 018
OS_ADD: +3.00
OD_AXIS: 175
OS_CYLINDER: +2.00

## 2017-08-10 ASSESSMENT — REFRACTION_MANIFEST
OS_SPHERE: -1.75
OD_AXIS: 174
OS_ADD: +3.00
OS_CYLINDER: +1.50
OD_CYLINDER: +2.00
OS_AXIS: 012
OD_SPHERE: -1.00
OD_ADD: +3.00

## 2017-08-10 ASSESSMENT — CUP TO DISC RATIO
OD_RATIO: 0.4
OS_RATIO: 0.4

## 2017-08-10 ASSESSMENT — VISUAL ACUITY
OD_CC: 1-
OS_CC: 2
OS_CC+: -1
OD_CC: 20/20
METHOD: SNELLEN - LINEAR
CORRECTION_TYPE: GLASSES
OS_CC: 20/30

## 2017-08-10 ASSESSMENT — EXTERNAL EXAM - LEFT EYE: OS_EXAM: 1+ BROW PTOSIS

## 2017-08-10 ASSESSMENT — CONF VISUAL FIELD
OS_NORMAL: 1
OD_NORMAL: 1

## 2017-08-10 ASSESSMENT — EXTERNAL EXAM - RIGHT EYE: OD_EXAM: 1+ BROW PTOSIS

## 2017-08-10 NOTE — PROGRESS NOTES
" Current Eye Medications:  Ocuvite.      Subjective:  Comprehensive Eye Exam.  Vision appears to be about the same.  Occasionally, she closes her left eye to see something clearer in the distance.  She complains of mucous obscuring her vision intermittently each day- she has to blink to get her vision to clear.      Objective:  See Ophthalmology Exam.       Assessment:  Stable eye exam.      ICD-10-CM    1. Encounter for examination of eyes and vision with abnormal findings Z01.01 REFRACTIVE STATUS   2. Presbyopia H52.4 REFRACTIVE STATUS   3. Pseudophakia, ou Z96.1 EYE EXAM (SIMPLE-NONBILLABLE)   4. Macular degeneration, dry, mild, ou H35.30    5. Posterior vitreous detachment, bilateral H43.813         Plan:  Possible clouding of posterior capsule discussed.   Take a multiple vitamin or \"eye vitamin\" daily.  Protect your eyes outdoors from ultraviolet rays with sunglasses and/or brimmed hat.  Have spinach (cooked or raw), colorful fruits, walnuts, hazelnuts, almonds in your diet.  Monitor the vision in each eye weekly - call if any sudden persistent changes.   Use artificial tears up to 4 times daily both eyes. (Refresh Tears or Systane Ultra/Balance)   Glasses Rx given - optional   Call in April 2018 for an appointment in August 2018 for Complete Exam    Dr. Pcae [phone]    "

## 2017-08-10 NOTE — MR AVS SNAPSHOT
"              After Visit Summary   8/10/2017    Kelly Mansfield    MRN: [id number]           Patient Information     Date Of Birth          6/8/1931        Visit Information        Provider Department      8/10/2017 10:00 AM [facility]        Today's Diagnoses     Encounter for examination of eyes and vision with abnormal findings    -  1    Presbyopia        Myopia, bilateral        Astigmatism of both eyes, unspecified type        Posterior vitreous detachment, bilateral        Macular degeneration, dry, mild, ou        Pseudophakia, ou          Care Instructions    Possible clouding of posterior capsule discussed.   Take a multiple vitamin or \"eye vitamin\" daily.  Protect your eyes outdoors from ultraviolet rays with sunglasses and/or brimmed hat.  Have spinach (cooked or raw), colorful fruits, walnuts, hazelnuts, almonds in your diet.  Monitor the vision in each eye weekly - call if any sudden persistent changes.   Use artificial tears up to 4 times daily both eyes. (Refresh Tears or Systane Ultra/Balance)   Glasses Rx given - optional   Call in April 2018 for an appointment in August 2018 for Complete Exam    Dr. Pace [phone]          Follow-ups after your visit        Your next 10 appointments already scheduled     Aug 31, 2017  9:15 AM CDT   MA SCREENING DIGITAL BILATERAL with [id number]    [address] [place] [id number] [phone]           Do not use any powder, lotion or deodorant under your arms or on your breast. If you do, we will ask you to remove it before your exam.  Wear comfortable, two-piece clothing.  If you have any allergies, tell your care team.  Bring any previous mammograms from other facilities or have them mailed to the breast center.              Who to contact     If you have questions or need follow up information about today's clinic visit or your schedule please " "contact [facility] directly at [phone].  Normal or non-critical lab and imaging results will be communicated to you by MyChart, letter or phone within 4 business days after the clinic has received the results. If you do not hear from us within 7 days, please contact the clinic through MyChart or phone. If you have a critical or abnormal lab result, we will notify you by phone as soon as possible.  Submit refill requests through Stuff[employer] or call your pharmacy and they will forward the refill request to us. Please allow 3 business days for your refill to be completed.          Additional Information About Your Visit        [employer] Information     StuffBuff lets you send messages to your doctor, view your test results, renew your prescriptions, schedule appointments and more. To sign up, go to [email or web address] . Click on \"Log in\" on the left side of the screen, which will take you to the Welcome page. Then click on \"Sign up Now\" on the right side of the page.     You will be asked to enter the access code listed below, as well as some personal information. Please follow the directions to create your username and password.     Your access code is: [email or web address]  Expires: 10/25/2017  2:13 PM     Your access code will  in 90 days. If you need help or a new code, please call your [place] clinic or [phone].        Care EveryWhere ID     This is your Care EveryWhere ID. This could be used by other organizations to access your [place] medical records  [id number]         Blood Pressure from Last 3 Encounters:   17 122/78   17 130/80   17 142/74    Weight from Last 3 Encounters:   17 80.7 kg (178 lb)   17 80.7 kg (178 lb)   17 78.9 kg (174 lb)              We Performed the Following     EYE EXAM (SIMPLE-NONBILLABLE)     REFRACTIVE STATUS        Primary Care Provider Office Phone # Fax #    Tatianna Mota [phone] [address]  [facility] [address]        Equal Access to Services     [facility] : Hadii juan aguilar khushiomid Sojulianaali, wachelyda luqadaha, qaybta kaedilbertolavern sepulveda, lefty bassett. So [facility] [phone].    ATENCIÓN: Si habla español, tiene a maria disposición servicios gratuitos de asistencia lingüística. [facility] [phone].    We comply with applicable federal civil rights laws and [place] laws. We do not discriminate on the basis of race, color, national origin, age, disability sex, sexual orientation or gender identity.            Thank you!     Thank you for choosing [facility]  for your care. Our goal is always to provide you with excellent care. Hearing back from our patients is one way we can continue to improve our services. Please take a few minutes to complete the written survey that you may receive in the mail after your visit with us. Thank you!             Your Updated Medication List - Protect others around you: Learn how to safely use, store and throw away your medicines at www.disposemymeds.org.          This list is accurate as of: 8/10/17 11:21 AM.  Always use your most recent med list.                   Brand Name Dispense Instructions for use Diagnosis    albuterol 108 (90 BASE) MCG/ACT Inhaler    PROAIR HFA/PROVENTIL HFA/VENTOLIN HFA    1 Inhaler    Inhale 2 puffs into the lungs every 4 hours as needed    Mild persistent asthma without complication       aspirin 81 MG tablet      Take by mouth daily    TIA (transient ischemic attack)       atorvastatin 20 MG tablet    LIPITOR    90 tablet    TAKE ONE TABLET BY MOUTH EVERY DAY (MUST HAVE FASTING LABS FOR FURTHER REFILLS)    Hyperlipidemia LDL goal <130       CALCIUM 1200 PO      1800mg daily        celecoxib 100 MG capsule    celeBREX    60 capsule    Take 1 capsule (100 mg) by mouth 2 times daily as needed for moderate pain    Lumbar spinal stenosis       cholecalciferol 1000 UNIT tablet    vitamin D     1 TABLET DAILY         clopidogrel 75 MG tablet    PLAVIX    90 tablet    Take 1 tablet (75 mg) by mouth daily    History of TIA (transient ischemic attack) and stroke       fluticasone 50 MCG/ACT spray    FLONASE    16 g    Spray 2 sprays into both nostrils 2 times daily    Nonallergic rhinitis       lisinopril 5 MG tablet    PRINIVIL/ZESTRIL    90 tablet    Take 1 tablet (5 mg) by mouth daily    Hypertension with target blood pressure goal under 150/90       mometasone-formoterol 200-5 MCG/ACT oral inhaler    DULERA    1 Inhaler    Inhale 2 puffs into the lungs 2 times daily    Mild persistent asthma without complication       OCUVITE PO      6mg daily        polyethylene glycol powder    MIRALAX/GLYCOLAX     Take 1 capful by mouth daily        VITAMIN E NATURAL PO       Other fatigue

## 2017-08-10 NOTE — PATIENT INSTRUCTIONS
"Possible clouding of posterior capsule discussed.   Take a multiple vitamin or \"eye vitamin\" daily.  Protect your eyes outdoors from ultraviolet rays with sunglasses and/or brimmed hat.  Have spinach (cooked or raw), colorful fruits, walnuts, hazelnuts, almonds in your diet.  Monitor the vision in each eye weekly - call if any sudden persistent changes.   Use artificial tears up to 4 times daily both eyes. (Refresh Tears or Systane Ultra/Balance)   Glasses Rx given - optional   Call in April 2018 for an appointment in August 2018 for Complete Exam    Dr. Pace [phone]  "

## 2017-08-15 DIAGNOSIS — M48.061 LUMBAR SPINAL STENOSIS: ICD-10-CM

## 2017-08-15 NOTE — TELEPHONE ENCOUNTER
celecoxib (CELEBREX) 100 MG capsule      Last Written Prescription Date: 12/2/2016  Last Quantity: 60, # refills: 1  Last Office Visit with G, P or [facility] prescribing provider: 5/11/2017       Creatinine   Date Value Ref Range Status   05/11/2017 0.77 0.52 - 1.04 mg/dL Final     Lab Results   Component Value Date    AST 18 05/11/2017     Lab Results   Component Value Date    ALT 24 05/11/2017     BP Readings from Last 3 Encounters:   05/11/17 122/78   05/11/17 130/80   03/20/17 142/74

## 2017-08-16 RX ORDER — CELECOXIB 100 MG/1
CAPSULE ORAL
Qty: 60 CAPSULE | Refills: 5 | Status: SHIPPED | OUTPATIENT
Start: 2017-08-16 | End: 2017-12-14

## 2017-08-16 NOTE — TELEPHONE ENCOUNTER
Prescription approved per [facility] Refill Protocol.  Keren Webber, RN - BC

## 2017-08-31 ENCOUNTER — RADIANT APPOINTMENT (OUTPATIENT)
Dept: MAMMOGRAPHY | Facility: CLINIC | Age: 82
End: 2017-08-31
Payer: COMMERCIAL

## 2017-08-31 DIAGNOSIS — Z12.31 VISIT FOR SCREENING MAMMOGRAM: ICD-10-CM

## 2017-08-31 PROCEDURE — G0202 SCR MAMMO BI INCL CAD: HCPCS | Mod: TC

## 2017-12-12 NOTE — PATIENT INSTRUCTIONS
[facility]    If you have any questions regarding to your visit please contact your care team:       Team Red:   Clinic Hours Telephone Number   Dr. Opal Calvo  (pediatrics)  Sharona Arango NP 7am-7pm  Monday - Thursday   7am-5pm  Fridays  [phone] (fax)    Stephen KIM  [phone]   Urgent Care - [place] and [place] Monday-Friday  [place] - 11am-8pm  Saturday-Sunday  Both sites - 9am-5pm  [phone] - [facility]  [phone] - [place]       What options do I have for visits at the clinic other than the traditional office visit?  To expand how we care for you, many of our providers are utilizing electronic visits (e-visits) and telephone visits, when medically appropriate, for interactions with their patients rather than a visit in the clinic.   We also offer nurse visits for many medical concerns. Just like any other service, we will bill your insurance company for this type of visit based on time spent on the phone with your provider. Not all insurance companies cover these visits. Please check with your medical insurance if this type of visit is covered. You will be responsible for any charges that are not paid by your insurance.      E-visits via [employer] Class[employer]:  generally incur a $35.00 fee.  Telephone visits:  Time spent on the phone: *charged based on time that is spent on the phone in increments of 10 minutes. Estimated cost:   5-10 mins $30.00   11-20 mins. $59.00   21-30 mins. $85.00     As always, Thank you for trusting us with your health care needs!              Preventive Health Recommendations    Female Ages 65 +    Yearly exam:     See your health care provider every year in order to  o Review health changes.   o Discuss preventive care.    o Review your medicines if your doctor has prescribed any.      You no longer need a yearly Pap test unless you've had an abnormal Pap test in the past 10 years. If you have  vaginal symptoms, such as bleeding or discharge, be sure to talk with your provider about a Pap test.      Every 1 to 2 years, have a mammogram.  If you are over 69, talk with your health care provider about whether or not you want to continue having screening mammograms.      Every 10 years, have a colonoscopy. Or, have a yearly FIT test (stool test). These exams will check for colon cancer.       Have a cholesterol test every 5 years, or more often if your doctor advises it.       Have a diabetes test (fasting glucose) every three years. If you are at risk for diabetes, you should have this test more often.       At age 65, have a bone density scan (DEXA) to check for osteoporosis (brittle bone disease).    Shots:    Get a flu shot each year.    Get a tetanus shot every 10 years.    Talk to your doctor about your pneumonia vaccines. There are now two you should receive - Pneumovax (PPSV 23) and Prevnar (PCV 13).    Talk to your doctor about the shingles vaccine.    Talk to your doctor about the hepatitis B vaccine.    Nutrition:     Eat at least 5 servings of fruits and vegetables each day.      Eat whole-grain bread, whole-wheat pasta and brown rice instead of white grains and rice.      Talk to your provider about Calcium and Vitamin D.     Lifestyle    Exercise at least 150 minutes a week (30 minutes a day, 5 days a week). This will help you control your weight and prevent disease.      Limit alcohol to one drink per day.      No smoking.       Wear sunscreen to prevent skin cancer.       See your dentist twice a year for an exam and cleaning.      See your eye doctor every 1 to 2 years to screen for conditions such as glaucoma, macular degeneration and cataracts.    Discharge ALICIA Burks CMA

## 2017-12-14 ENCOUNTER — OFFICE VISIT (OUTPATIENT)
Dept: FAMILY MEDICINE | Facility: CLINIC | Age: 82
End: 2017-12-14
Payer: COMMERCIAL

## 2017-12-14 VITALS
TEMPERATURE: 97.3 F | OXYGEN SATURATION: 97 % | SYSTOLIC BLOOD PRESSURE: 124 MMHG | WEIGHT: 162 LBS | BODY MASS INDEX: 34 KG/M2 | HEIGHT: 58 IN | HEART RATE: 87 BPM | DIASTOLIC BLOOD PRESSURE: 70 MMHG

## 2017-12-14 DIAGNOSIS — I10 HYPERTENSION WITH TARGET BLOOD PRESSURE GOAL UNDER 150/90: ICD-10-CM

## 2017-12-14 DIAGNOSIS — J45.30 MILD PERSISTENT ASTHMA WITHOUT COMPLICATION: ICD-10-CM

## 2017-12-14 DIAGNOSIS — I67.9 CEREBROVASCULAR DISEASE: ICD-10-CM

## 2017-12-14 DIAGNOSIS — M85.80 OSTEOPENIA, UNSPECIFIED LOCATION: ICD-10-CM

## 2017-12-14 DIAGNOSIS — E78.5 HYPERLIPIDEMIA LDL GOAL <100: ICD-10-CM

## 2017-12-14 DIAGNOSIS — Z23 NEED FOR PROPHYLACTIC VACCINATION AGAINST STREPTOCOCCUS PNEUMONIAE (PNEUMOCOCCUS): ICD-10-CM

## 2017-12-14 DIAGNOSIS — Z00.00 ROUTINE GENERAL MEDICAL EXAMINATION AT A HEALTH CARE FACILITY: Primary | ICD-10-CM

## 2017-12-14 DIAGNOSIS — I87.8 VENOUS STASIS: ICD-10-CM

## 2017-12-14 DIAGNOSIS — H35.30 MACULAR DEGENERATION: ICD-10-CM

## 2017-12-14 LAB
ANION GAP SERPL CALCULATED.3IONS-SCNC: 5 MMOL/L (ref 3–14)
BUN SERPL-MCNC: 17 MG/DL (ref 7–30)
CALCIUM SERPL-MCNC: 10.1 MG/DL (ref 8.5–10.1)
CHLORIDE SERPL-SCNC: 105 MMOL/L (ref 94–109)
CHOLEST SERPL-MCNC: 167 MG/DL
CO2 SERPL-SCNC: 30 MMOL/L (ref 20–32)
CREAT SERPL-MCNC: 0.7 MG/DL (ref 0.52–1.04)
GFR SERPL CREATININE-BSD FRML MDRD: 80 ML/MIN/1.7M2
GLUCOSE SERPL-MCNC: 91 MG/DL (ref 70–99)
HDLC SERPL-MCNC: 84 MG/DL
LDLC SERPL CALC-MCNC: 70 MG/DL
NONHDLC SERPL-MCNC: 83 MG/DL
POTASSIUM SERPL-SCNC: 4 MMOL/L (ref 3.4–5.3)
SODIUM SERPL-SCNC: 140 MMOL/L (ref 133–144)
TRIGL SERPL-MCNC: 67 MG/DL

## 2017-12-14 PROCEDURE — 36415 COLL VENOUS BLD VENIPUNCTURE: CPT | Performed by: FAMILY MEDICINE

## 2017-12-14 PROCEDURE — 90732 PPSV23 VACC 2 YRS+ SUBQ/IM: CPT | Performed by: FAMILY MEDICINE

## 2017-12-14 PROCEDURE — 99397 PER PM REEVAL EST PAT 65+ YR: CPT | Mod: 25 | Performed by: FAMILY MEDICINE

## 2017-12-14 PROCEDURE — 80048 BASIC METABOLIC PNL TOTAL CA: CPT | Performed by: FAMILY MEDICINE

## 2017-12-14 PROCEDURE — 80061 LIPID PANEL: CPT | Performed by: FAMILY MEDICINE

## 2017-12-14 PROCEDURE — G0009 ADMIN PNEUMOCOCCAL VACCINE: HCPCS | Performed by: FAMILY MEDICINE

## 2017-12-14 PROCEDURE — 99207 C PAF COMPLETED  NO CHARGE: CPT | Mod: 25 | Performed by: FAMILY MEDICINE

## 2017-12-14 ASSESSMENT — ACTIVITIES OF DAILY LIVING (ADL)
CURRENT_FUNCTION: TRANSPORTATION REQUIRES ASSISTANCE
I_NEED_ASSISTANCE_FOR_THE_FOLLOWING_DAILY_ACTIVITIES:: HOUSEWORK
I_NEED_ASSISTANCE_FOR_THE_FOLLOWING_DAILY_ACTIVITIES:: TRANSPORTATION
CURRENT_FUNCTION: HOUSEWORK REQUIRES ASSISTANCE

## 2017-12-14 NOTE — NURSING NOTE
Screening Questionnaire for Adult Immunization    Are you sick today?   No   Do you have allergies to medications, food, a vaccine component or latex?   No   Have you ever had a serious reaction after receiving a vaccination?   No   Do you have a long-term health problem with heart disease, lung disease, asthma, kidney disease, metabolic disease (e.g. diabetes), anemia, or other blood disorder?   No   Do you have cancer, leukemia, HIV/AIDS, or any other immune system problem?   No   In the past 3 months, have you taken medications that affect  your immune system, such as prednisone, other steroids, or anticancer drugs; drugs for the treatment of rheumatoid arthritis, Crohn s disease, or psoriasis; or have you had radiation treatments?   No   Have you had a seizure, or a brain or other nervous system problem?   No   During the past year, have you received a transfusion of blood or blood     products, or been given immune (gamma) globulin or antiviral drug?   No   For women: Are you pregnant or is there a chance you could become        pregnant during the next month?   No   Have you received any vaccinations in the past 4 weeks?   No     Immunization questionnaire answers were all negative.        Per orders of Dr. Tatianna Mota, injection of Pneumovax 23 given by Janice Burks. Patient instructed to remain in clinic for 15 minutes afterwards, and to report any adverse reaction to me immediately.       Screening performed by Janice Burks on 12/14/2017 at 11:01 AM.

## 2017-12-14 NOTE — MR AVS SNAPSHOT
After Visit Summary   12/14/2017    Kelly Mansfield    MRN: [id number]           Patient Information     Date Of Birth          6/8/1931        Visit Information        Provider Department      12/14/2017 10:00 AM Tatianna Mota MD [facility]        Today's Diagnoses     Need for prophylactic vaccination against Streptococcus pneumoniae (pneumococcus)    -  1    Routine general medical examination at a health care facility        Mild persistent asthma without complication        Cerebrovascular disease        Macular degeneration, dry, mild, ou        Osteopenia, unspecified location        Hypertension with target blood pressure goal under 150/90        Hyperlipidemia LDL goal <100        Encounter for immunization          Care Instructions    [facility]    If you have any questions regarding to your visit please contact your care team:       Team Red:   Clinic Hours Telephone Number   Dr. Opal Calvo  (pediatrics)  Sharona Arango NP 7am-7pm  Monday - Thursday   7am-5pm  Fridays  [phone] (fax)    Stephen KIM  [phone]   Urgent Care - [place] and [place] Monday-Friday  [place] - 11am-8pm  Saturday-Sunday  Both sites - 9am-5pm  [phone] - [facility]  [phone] [facility]       What options do I have for visits at the clinic other than the traditional office visit?  To expand how we care for you, many of our providers are utilizing electronic visits (e-visits) and telephone visits, when medically appropriate, for interactions with their patients rather than a visit in the clinic.   We also offer nurse visits for many medical concerns. Just like any other service, we will bill your insurance company for this type of visit based on time spent on the phone with your provider. Not all insurance companies cover these visits. Please check with your medical insurance if this type of visit is  covered. You will be responsible for any charges that are not paid by your insurance.      E-visits via [employer]:  generally incur a $35.00 fee.  Telephone visits:  Time spent on the phone: *charged based on time that is spent on the phone in increments of 10 minutes. Estimated cost:   5-10 mins $30.00   11-20 mins. $59.00   21-30 mins. $85.00     As always, Thank you for trusting us with your health care needs!              Preventive Health Recommendations    Female Ages 65 +    Yearly exam:     See your health care provider every year in order to  o Review health changes.   o Discuss preventive care.    o Review your medicines if your doctor has prescribed any.      You no longer need a yearly Pap test unless you've had an abnormal Pap test in the past 10 years. If you have vaginal symptoms, such as bleeding or discharge, be sure to talk with your provider about a Pap test.      Every 1 to 2 years, have a mammogram.  If you are over 69, talk with your health care provider about whether or not you want to continue having screening mammograms.      Every 10 years, have a colonoscopy. Or, have a yearly FIT test (stool test). These exams will check for colon cancer.       Have a cholesterol test every 5 years, or more often if your doctor advises it.       Have a diabetes test (fasting glucose) every three years. If you are at risk for diabetes, you should have this test more often.       At age 65, have a bone density scan (DEXA) to check for osteoporosis (brittle bone disease).    Shots:    Get a flu shot each year.    Get a tetanus shot every 10 years.    Talk to your doctor about your pneumonia vaccines. There are now two you should receive - Pneumovax (PPSV 23) and Prevnar (PCV 13).    Talk to your doctor about the shingles vaccine.    Talk to your doctor about the hepatitis B vaccine.    Nutrition:     Eat at least 5 servings of fruits and vegetables each day.      Eat whole-grain bread, whole-wheat pasta and  "brown rice instead of white grains and rice.      Talk to your provider about Calcium and Vitamin D.     Lifestyle    Exercise at least 150 minutes a week (30 minutes a day, 5 days a week). This will help you control your weight and prevent disease.      Limit alcohol to one drink per day.      No smoking.       Wear sunscreen to prevent skin cancer.       See your dentist twice a year for an exam and cleaning.      See your eye doctor every 1 to 2 years to screen for conditions such as glaucoma, macular degeneration and cataracts.    Raghu Burks CMA            Follow-ups after your visit        Who to contact     If you have questions or need follow up information about today's clinic visit or your schedule please contact [facility] directly at [phone].  Normal or non-critical lab and imaging results will be communicated to you by MyChart, letter or phone within 4 business days after the clinic has received the results. If you do not hear from us within 7 days, please contact the clinic through "[employer]hart or phone. If you have a critical or abnormal lab result, we will notify you by phone as soon as possible.  Submit refill requests through RFEyeD or call your pharmacy and they will forward the refill request to us. Please allow 3 business days for your refill to be completed.          Additional Information About Your Visit        RFEyeD Information     R[employer] lets you send messages to your doctor, view your test results, renew your prescriptions, schedule appointments and more. To sign up, go to [email or web address] . Click on \"Log in\" on the left side of the screen, which will take you to the Welcome page. Then click on \"Sign up Now\" on the right side of the page.     You will be asked to enter the access code listed below, as well as some personal information. Please follow the directions to create your username and password.     Your access code is: " [email or web address]  Expires: 3/14/2018 10:41 AM     Your access code will  in 90 days. If you need help or a new code, please call your [place] clinic or [phone].        Care EveryWhere ID     This is your Care EveryWhere ID. This could be used by other organizations to access your [place] medical records  [id number]        Your Vitals Were     Pulse Temperature Height Pulse Oximetry Breastfeeding? BMI (Body Mass Index)    87 97.3  F (36.3  C) (Oral) 4' 10\" (1.473 m) 97% No 33.86 kg/m2       Blood Pressure from Last 3 Encounters:   17 124/70   17 122/78   17 130/80    Weight from Last 3 Encounters:   17 162 lb (73.5 kg)   17 178 lb (80.7 kg)   17 178 lb (80.7 kg)              We Performed the Following     Basic metabolic panel     LIFELINE     Lipid panel reflex to direct LDL Fasting     PAF COMPLETED     PNEUMOCOCCAL VACCINE,ADULT,SQ OR IM        Primary Care Provider Office Phone # Fax #    Tatianna Mota [phone]       [address] [facility] [address]        Equal Access to Services     [facility]DR [facility] : Hadii juan ku hadasho Soomaali, waaxda luqadaha, qaybta kaalmada adeegyada, lefty chen . So [facility] [phone].    ATENCIÓN: Si habla español, tiene a maria disposición servicios gratuitos de asistencia lingüística. Llame al [phone].    We comply with applicable federal civil rights laws and [place] laws. We do not discriminate on the basis of race, color, national origin, age, disability, sex, sexual orientation, or gender identity.            Thank you!     Thank you for choosing [facility]  for your care. Our goal is always to provide you with excellent care. Hearing back from our patients is one way we can continue to improve our services. Please take a few minutes to complete the written survey that you may receive in the mail after your visit with us. Thank you!             Your Updated " Medication List - Protect others around you: Learn how to safely use, store and throw away your medicines at www.disposemymeds.org.          This list is accurate as of: 12/14/17 10:41 AM.  Always use your most recent med list.                   Brand Name Dispense Instructions for use Diagnosis    albuterol 108 (90 BASE) MCG/ACT Inhaler    PROAIR HFA/PROVENTIL HFA/VENTOLIN HFA    1 Inhaler    Inhale 2 puffs into the lungs every 4 hours as needed    Mild persistent asthma without complication       aspirin 81 MG tablet      Take by mouth daily    TIA (transient ischemic attack)       atorvastatin 20 MG tablet    LIPITOR    90 tablet    TAKE ONE TABLET BY MOUTH EVERY DAY (MUST HAVE FASTING LABS FOR FURTHER REFILLS)    Hyperlipidemia LDL goal <130       CALCIUM 1200 PO      1800mg daily        cholecalciferol 1000 UNIT tablet    vitamin D3     1 TABLET DAILY        clopidogrel 75 MG tablet    PLAVIX    90 tablet    Take 1 tablet (75 mg) by mouth daily    History of TIA (transient ischemic attack) and stroke       fluticasone 50 MCG/ACT spray    FLONASE    16 g    Spray 2 sprays into both nostrils 2 times daily    Nonallergic rhinitis       lisinopril 5 MG tablet    PRINIVIL/ZESTRIL    90 tablet    Take 1 tablet (5 mg) by mouth daily    Hypertension with target blood pressure goal under 150/90       mometasone-formoterol 200-5 MCG/ACT oral inhaler    DULERA    1 Inhaler    Inhale 2 puffs into the lungs 2 times daily    Mild persistent asthma without complication       OCUVITE PO      6mg daily        polyethylene glycol powder    MIRALAX/GLYCOLAX     Take 1 capful by mouth daily

## 2017-12-14 NOTE — PROGRESS NOTES
SUBJECTIVE:   Kelly Mansfield is a 86 year old female who presents for Preventive Visit.      Are you in the first 12 months of your Medicare coverage?  No    Physical   Annual:     Getting at least 3 servings of Calcium per day::  Yes    Bi-annual eye exam::  Yes    Dental care twice a year::  Yes    Sleep apnea or symptoms of sleep apnea::  None    Diet::  Regular (no restrictions)    Frequency of exercise::  4-5 days/week    Duration of exercise::  15-30 minutes    Taking medications regularly::  Yes    Medication side effects::  None    Additional concerns today::  No    Ability to successfully perform activities of daily living: transportation requires assistance and housework requires assistance  Home Safety:  No safety concerns identified  Hearing Impairment: no hearing concerns    Ability to successfully perform activities of daily living: No, needs assistance with: transportation  Home safety:  none identified   Hearing impairment: Yes,       Fall risk:Fallen 2 or more times in the past year?: No  Any fall with injury in the past year?: No      COGNITIVE SCREEN  1) Repeat 3 items (Banana, Sunrise, Chair)    2) Clock draw: NORMAL- incorrect hand placement  3) 3 item recall: Recalls 3 objects  Results: NORMAL clock, 1-2 items recalled: COGNITIVE IMPAIRMENT LESS LIKELY    Mini-CogTM Copyright S Javier. Licensed by the author for use in [facility]; reprinted with permission (soob@.[facility]). All rights reserved.          Reviewed and updated as needed this visit by clinical staffTobacco  Allergies  Meds  Med Hx  Surg Hx  Fam Hx  Soc Hx        Reviewed and updated as needed this visit by Provider  Allergies  Meds        Social History   Substance Use Topics     Smoking status: Former Smoker     Packs/day: 0.50     Years: 25.00     Quit date: 1/1/1991     Smokeless tobacco: Never Used     Alcohol use No       Alcohol Use 12/14/2017   If you drink alcohol, do you typically have greater than 3  drinks per day OR greater than 7 drinks per week?   Not applicable   No flowsheet data found.            Hyperlipidemia Follow-Up      Rate your low fat/cholesterol diet?: good    Taking statin?  Yes, no muscle aches from statin    Other lipid medications/supplements?:  none    Hypertension Follow-up      Outpatient blood pressures are not being checked.    Low Salt Diet: no added salt    Cerebrovascular Follow-up      Patient history: TIA    Residual symptoms: None    Worsened or new symptoms since last visit: No    Daily aspirin use: Yes    Hypertension controlled: Yes        Pt occasionally gets swelling in her legs  No sob  No PND or orthopnea  Asthma Follow-Up    Was ACT completed today?    Yes    ACT Total Scores 12/14/2017   ACT TOTAL SCORE -   ASTHMA ER VISITS -   ASTHMA HOSPITALIZATIONS -   ACT TOTAL SCORE (Goal Greater than or Equal to 20) 25   In the past 12 months, how many times did you visit the emergency room for your asthma without being admitted to the hospital? 0   In the past 12 months, how many times were you hospitalized overnight because of your asthma? 0       Recent asthma triggers that patient is dealing with: upper respiratory infections          }        Today's PHQ-2 Score:   PHQ-2 ( 1999 [employer]) 12/14/2017   Q1: Little interest or pleasure in doing things 0   Q2: Feeling down, depressed or hopeless 1   PHQ-2 Score 1   Q1: Little interest or pleasure in doing things Not at all   Q2: Feeling down, depressed or hopeless Not at all   PHQ-2 Score 0       Do you feel safe in your environment - Yes    Do you have a Health Care Directive?: No: Advance care planning reviewed with patient; information given to patient to review.      Current providers sharing in care for this patient include: Patient Care Team:  Tatianna Mota MD as PCP - General (Family Practice)    The following health maintenance items are reviewed in Epic and correct as of today:  Health Maintenance   Topic Date Due      SPIROMETRY Q1 YR  05/05/2016     PNEUMOCOCCAL (2 of 2 - PPSV23) 07/10/2016     ADVANCE DIRECTIVE PLANNING Q5 YRS  08/24/2016     ASTHMA ACTION PLAN Q1 YR  05/03/2017     LIPID MONITORING Q1 YEAR  10/24/2017     ASTHMA CONTROL TEST Q6 MOS  11/11/2017     BMP Q1 YR  05/11/2018     FALL RISK ASSESSMENT  05/11/2018     DEXA Q2 YR  05/18/2018     EYE EXAM Q1 YEAR  08/10/2018     TETANUS IMMUNIZATION (SYSTEM ASSIGNED)  08/17/2022     INFLUENZA VACCINE (SYSTEM ASSIGNED)  Completed     Labs reviewed in EPIC  BP Readings from Last 3 Encounters:   12/14/17 124/70   05/11/17 122/78   05/11/17 130/80    Wt Readings from Last 3 Encounters:   12/14/17 162 lb (73.5 kg)   07/27/17 178 lb (80.7 kg)   05/25/17 178 lb (80.7 kg)                  Patient Active Problem List   Diagnosis     Osteopenia     Family history of malignant neoplasm of breast     Tendon cyst     Stiffness of joint, not elsewhere classified, forearm     Clear cell adenocarcinoma of uterus (H)     Advanced directives, counseling/discussion     Pseudophakia, ou     Lumbar spinal stenosis     Diagnostic skin and sensitization tests     Nonallergic rhinitis     Mild persistent asthma     Health Care Home     History of depression     Sebaceous cyst     History of total knee replacement     TIA (transient ischemic attack)     Incomplete tear of left rotator cuff     Primary osteoarthritis of left shoulder     Posterior vitreous detachment, bilateral     Macular degeneration, dry, mild, ou     Hypertension with target blood pressure goal under 150/90     History of TIA (transient ischemic attack) and stroke     Shoulder impingement, left     Grief     Hyperlipidemia LDL goal <100     Morbid obesity due to excess calories (H)     Cerebrovascular disease     Past Surgical History:   Procedure Laterality Date     APPENDECTOMY  age 12     ARTHROSCOPY KNEE RT/LT  08/99    right     C NONSPECIFIC PROCEDURE  57    tailbone cyst removed     C NONSPECIFIC PROCEDURE      Vein  surgery     C TOTAL KNEE ARTHROPLASTY Right 6/3/15     Carpal tunnel surgery Bilateral      CATARACT IOL, RT/LT       COLONOSCOPY  09/02     ENT SURGERY      Skin lesions     ENT SURGERY  2-29-12    Removal lesion back of neck     HYSTERECTOMY, LEYDA  10/02     HYSTERECTOMY, LEYDA  2002    ca uterus     PHACOEMULSIFICATION WITH STANDARD INTRAOCULAR LENS IMPLANT  12/2008; 2/2009    right eye; left eye     SALPINGO OOPHORECTOMY,R/L/VAHE  10/02    Salpingo Oophorectomy/BILATERAL       Social History   Substance Use Topics     Smoking status: Former Smoker     Packs/day: 0.50     Years: 25.00     Quit date: 1/1/1991     Smokeless tobacco: Never Used     Alcohol use No     Family History   Problem Relation Age of Onset     HEART DISEASE Mother      Migraines Mother      unknown age     Unknown/Adopted Father      Unknown/Adopted Maternal Grandmother      Unknown/Adopted Maternal Grandfather      Unknown/Adopted Paternal Grandmother      Unknown/Adopted Paternal Grandfather      DIABETES Daughter      Breast Cancer Other      dads side     Depression Son      committed suicide 3/2010         Current Outpatient Prescriptions   Medication Sig Dispense Refill     order for DME Equipment being ordered: colten Hose stockings 1 each 0     fluticasone (FLONASE) 50 MCG/ACT spray Spray 2 sprays into both nostrils 2 times daily 16 g 6     mometasone-formoterol (DULERA) 200-5 MCG/ACT oral inhaler Inhale 2 puffs into the lungs 2 times daily 1 Inhaler 11     atorvastatin (LIPITOR) 20 MG tablet TAKE ONE TABLET BY MOUTH EVERY DAY (MUST HAVE FASTING LABS FOR FURTHER REFILLS) 90 tablet 3     lisinopril (PRINIVIL/ZESTRIL) 5 MG tablet Take 1 tablet (5 mg) by mouth daily 90 tablet 3     albuterol (PROAIR HFA/PROVENTIL HFA/VENTOLIN HFA) 108 (90 BASE) MCG/ACT Inhaler Inhale 2 puffs into the lungs every 4 hours as needed 1 Inhaler 6     clopidogrel (PLAVIX) 75 MG tablet Take 1 tablet (75 mg) by mouth daily 90 tablet 3     polyethylene glycol  (MIRALAX/GLYCOLAX) powder Take 1 capful by mouth daily       aspirin 81 MG tablet Take by mouth daily       OCUVITE OR 6mg daily       CALCIUM 1200 OR 1800mg daily       VITAMIN D 1000 UNIT OR TABS 1 TABLET DAILY       No Known Allergies  Recent Labs   Lab Test  12/14/17   1122  05/11/17   1018  10/24/16   0950   10/06/15   0931   11/28/14   1111  08/04/14   1151   11/27/13   1032   11/16/09   1055   A1C   --    --    --    --    --    --    --    --    --    --    --   6.0   LDL  70   --   86   --   84   --   86   --    --   86   < >  131*   HDL  84   --   72   --   55   --   59   --    --   45*   < >  57   TRIG  67   --   75   --   116   --   211*   --    --   174*   < >  177*   ALT   --   24   --    --    --    --   22   --    --   29   < >  19   CR  0.70  0.77  0.78   < >   --    < >  0.80   --    < >  0.71   < >  0.79   GFRESTIMATED  80  72  70   < >   --    < >  68   --    < >  79   < >  70   GFRESTBLACK  >90  87  84   < >   --    < >  83   --    < >  >90   < >  85   POTASSIUM  4.0  4.0  4.5   < >   --    < >  3.6   --    < >  4.0   < >  4.6   TSH   --   1.38   --    --    --    --    --   3.01   --    --    < >   --     < > = values in this interval not displayed.              Review of Systems  C: NEGATIVE for fever, chills, change in weight  I: NEGATIVE for worrisome rashes, moles or lesions  E: NEGATIVE for vision changes or irritation  E/M: NEGATIVE for ear, mouth and throat problems  R: NEGATIVE for significant cough or SOB  B: NEGATIVE for masses, tenderness or discharge  CV: NEGATIVE for chest pain, palpitations or peripheral edema  GI: NEGATIVE for nausea, abdominal pain, heartburn, or change in bowel habits  : NEGATIVE for frequency, dysuria, or hematuria  M: NEGATIVE for significant arthralgias or myalgia  N: NEGATIVE for weakness, dizziness or paresthesias  E: NEGATIVE for temperature intolerance, skin/hair changes  H: NEGATIVE for bleeding problems  P: NEGATIVE for changes in mood or  "affect    OBJECTIVE:   /70  Pulse 87  Temp 97.3  F (36.3  C) (Oral)  Ht 4' 10\" (1.473 m)  Wt 162 lb (73.5 kg)  SpO2 97%  Breastfeeding? No  BMI 33.86 kg/m2 Estimated body mass index is 33.86 kg/(m^2) as calculated from the following:    Height as of this encounter: 4' 10\" (1.473 m).    Weight as of this encounter: 162 lb (73.5 kg).  Physical Exam  GENERAL APPEARANCE: healthy, alert and no distress  GENERAL APPEARANCE: alert, no distress and obese  EYES: Eyes grossly normal to inspection, PERRL and conjunctivae and sclerae normal  HENT: ear canals and TM's normal, nose and mouth without ulcers or lesions, oropharynx clear and oral mucous membranes moist  NECK: no adenopathy, no asymmetry, masses, or scars and thyroid normal to palpation  RESP: lungs clear to auscultation - no rales, rhonchi or wheezes  BREAST: normal without masses, tenderness or nipple discharge and no palpable axillary masses or adenopathy  CV: regular rate and rhythm, normal S1 S2, no S3 or S4, no murmur, click or rub, no peripheral edema and peripheral pulses strong  ABDOMEN: soft, nontender, no hepatosplenomegaly, no masses and bowel sounds normal  MS: no musculoskeletal defects are noted and gait is age appropriate without ataxia  SKIN: no suspicious lesions or rashes  NEURO: Normal strength and tone, sensory exam grossly normal, mentation intact and speech normal  PSYCH: mentation appears normal and affect normal/bright    ASSESSMENT / PLAN:   1. Routine general medical examination at a health care facility  Pt would Like [facility]  - [facility]    2. Mild persistent asthma without complication  controlled  - PAF COMPLETED    3. Cerebrovascular disease  Stable   - PAF COMPLETED    4. Macular degeneration, dry, mild, ou  Advised annual eye check  - PAF COMPLETED    5. Osteopenia, unspecified location  **  - PAF COMPLETED    6. Hypertension with target blood pressure goal under 150/90   Take Vitamin D 1000 units daily with Calcium 1200 " "mg daily.    - Basic metabolic panel    7. Hyperlipidemia LDL goal <100  Pending   - Lipid panel reflex to direct LDL Fasting    8. Need for prophylactic vaccination against Streptococcus pneumoniae (pneumococcus)      9. Encounter for immunization    - PNEUMOCOCCAL VACCINE,ADULT,SQ OR IM    10. Venous stasis  Elevate legs  Low salt diet  - order for DME; Equipment being ordered: colten Hose stockings  Dispense: 1 each; Refill: 0    End of Life Planning:  Patient currently has an advanced directive: No.  I have verified the patient's ablity to prepare an advanced directive/make health care decisions.  Literature was provided to assist patient in preparing an advanced directive.    COUNSELING:  Reviewed preventive health counseling, as reflected in patient instructions       Regular exercise       Healthy diet/nutrition       Vision screening       Hearing screening       Dental care       Immunizations    Vaccinated for: Pneumococcal           Osteoporosis Prevention/Bone Health              Advanced Planning           Estimated body mass index is 33.86 kg/(m^2) as calculated from the following:    Height as of this encounter: 4' 10\" (1.473 m).    Weight as of this encounter: 162 lb (73.5 kg).  Weight management plan: low ludmila diet/   reports that she quit smoking about 26 years ago. She has a 12.50 pack-year smoking history. She has never used smokeless tobacco.        Appropriate preventive services were discussed with this patient, including applicable screening as appropriate for cardiovascular disease, diabetes, osteopenia/osteoporosis, and glaucoma.  As appropriate for age/gender, discussed screening for colorectal cancer, prostate cancer, breast cancer, and cervical cancer. Checklist reviewing preventive services available has been given to the patient.    Reviewed patients plan of care and provided an AVS. The Intermediate Care Plan ( asthma action plan, low back pain action plan, and migraine action plan) for " Kelly meets the Care Plan requirement. This Care Plan has been established and reviewed with the Patient.    Counseling Resources:  ATP IV Guidelines  Pooled Cohorts Equation Calculator  Breast Cancer Risk Calculator  FRAX Risk Assessment  ICSI Preventive Guidelines  Dietary Guidelines for Americans, 2010  USDA's MyPlate  ASA Prophylaxis  Lung CA Screening    Tatianna Mota MD  [facility]

## 2017-12-14 NOTE — NURSING NOTE
"Chief Complaint   Patient presents with     Wellness Visit       Initial /70  Pulse 87  Temp 97.3  F (36.3  C) (Oral)  Ht 4' 10\" (1.473 m)  Wt 162 lb (73.5 kg)  SpO2 97%  Breastfeeding? No  BMI 33.86 kg/m2 Estimated body mass index is 33.86 kg/(m^2) as calculated from the following:    Height as of this encounter: 4' 10\" (1.473 m).    Weight as of this encounter: 162 lb (73.5 kg).  Medication Reconciliation: complete   Ligia MARES MA      "

## 2017-12-14 NOTE — LETTER
[facility]  [address] [place] [address]    December 14, 2017    Kelly Mansfield  [address]   [place] [facility] [address]          Dear Kelly,  Electrolytes and kidney tests are normal.   Your cholesterol results are normal.   Continue same medicines.  Take care.  Enclosed is a copy of your results.     Results for orders placed or performed in visit on 12/14/17   Lipid panel reflex to direct LDL Fasting   Result Value Ref Range    Cholesterol 167 <200 mg/dL    Triglycerides 67 <150 mg/dL    HDL Cholesterol 84 >49 mg/dL    LDL Cholesterol Calculated 70 <100 mg/dL    Non HDL Cholesterol 83 <130 mg/dL   Basic metabolic panel   Result Value Ref Range    Sodium 140 133 - 144 mmol/L    Potassium 4.0 3.4 - 5.3 mmol/L    Chloride 105 94 - 109 mmol/L    Carbon Dioxide 30 20 - 32 mmol/L    Anion Gap 5 3 - 14 mmol/L    Glucose 91 70 - 99 mg/dL    Urea Nitrogen 17 7 - 30 mg/dL    Creatinine 0.70 0.52 - 1.04 mg/dL    GFR Estimate 80 >60 mL/min/1.7m2    GFR Estimate If Black >90 >60 mL/min/1.7m2    Calcium 10.1 8.5 - 10.1 mg/dL       If you have any questions or concerns, please call myself or my nurse at [phone].      Sincerely,        Tatianna Mota MD/pb

## 2017-12-14 NOTE — LETTER
My Asthma Action Plan  Name: Kelly Mansfield   YOB: 1931  Date: 12/14/2017   My doctor: Tatianna Mota MD   My clinic: [facility]        My Control Medicine: Mometasone + formoterol (Dulera) -  200/5 mcg see med sheet  My Rescue Medicine: Albuterol (Proair/Ventolin/Proventil) inhaler see med sheet   My Asthma Severity: mild persistent  Avoid your asthma triggers: upper respiratory infections  upper respiratory infections            GREEN ZONE   Good Control    I feel good    No cough or wheeze    Can work, sleep and play without asthma symptoms       Take your asthma control medicine every day.     1. If exercise triggers your asthma, take your rescue medication    15 minutes before exercise or sports, and    During exercise if you have asthma symptoms  2. Spacer to use with inhaler: If you have a spacer, make sure to use it with your inhaler             YELLOW ZONE Getting Worse  I have ANY of these:    I do not feel good    Cough or wheeze    Chest feels tight    Wake up at night   1. Keep taking your Green Zone medications  2. Start taking your rescue medicine:    every 20 minutes for up to 1 hour. Then every 4 hours for 24-48 hours.  3. If you stay in the Yellow Zone for more than 12-24 hours, contact your doctor.  4. If you do not return to the Green Zone in 12-24 hours or you get worse, start taking your oral steroid medicine if prescribed by your provider.           RED ZONE Medical Alert - Get Help  I have ANY of these:    I feel awful    Medicine is not helping    Breathing getting harder    Trouble walking or talking    Nose opens wide to breathe       1. Take your rescue medicine NOW  2. If your provider has prescribed an oral steroid medicine, start taking it NOW  3. Call your doctor NOW  4. If you are still in the Red Zone after 20 minutes and you have not reached your doctor:    Take your rescue medicine again and    Call 911 or go to the emergency room right away    See  your regular doctor within 2 weeks of an Emergency Room or Urgent Care visit for follow-up treatment.        Electronically signed by: Tatianna Mota, December 14, 2017    Annual Reminders:  Meet with Asthma Educator,  Flu Shot in the Fall, consider Pneumonia Vaccination for patients with asthma (aged 19 and older).    Pharmacy: [facility] [place] - [address]                    Asthma Triggers  How To Control Things That Make Your Asthma Worse    Triggers are things that make your asthma worse.  Look at the list below to help you find your triggers and what you can do about them.  You can help prevent asthma flare-ups by staying away from your triggers.      Trigger                                                          What you can do   Cigarette Smoke  Tobacco smoke can make asthma worse. Do not allow smoking in your home, car or around you.  Be sure no one smokes at a child s day care or school.  If you smoke, ask your health care provider for ways to help you quit.  Ask family members to quit too.  Ask your health care provider for a referral to Quit Plan to help you quit smoking, or call 7-414-937-PLAN.     Colds, Flu, Bronchitis  These are common triggers of asthma. Wash your hands often.  Don t touch your eyes, nose or mouth.  Get a flu shot every year.     Dust Mites  These are tiny bugs that live in cloth or carpet. They are too small to see. Wash sheets and blankets in hot water every week.   Encase pillows and mattress in dust mite proof covers.  Avoid having carpet if you can. If you have carpet, vacuum weekly.   Use a dust mask and HEPA vacuum.   Pollen and Outdoor Mold  Some people are allergic to trees, grass, or weed pollen, or molds. Try to keep your windows closed.  Limit time out doors when pollen count is high.   Ask you health care provider about taking medicine during allergy season.     Animal Dander  Some people are allergic to skin flakes, urine or saliva  from pets with fur or feathers. Keep pets with fur or feathers out of your home.    If you can t keep the pet outdoors, then keep the pet out of your bedroom.  Keep the bedroom door closed.  Keep pets off cloth furniture and away from stuffed toys.     Mice, Rats, and Cockroaches  Some people are allergic to the waste from these pests.   Cover food and garbage.  Clean up spills and food crumbs.  Store grease in the refrigerator.   Keep food out of the bedroom.   Indoor Mold  This can be a trigger if your home has high moisture. Fix leaking faucets, pipes, or other sources of water.   Clean moldy surfaces.  Dehumidify basement if it is damp and smelly.   Smoke, Strong Odors, and Sprays  These can reduce air quality. Stay away from strong odors and sprays, such as perfume, powder, hair spray, paints, smoke incense, paint, cleaning products, candles and new carpet.   Exercise or Sports  Some people with asthma have this trigger. Be active!  Ask your doctor about taking medicine before sports or exercise to prevent symptoms.    Warm up for 5-10 minutes before and after sports or exercise.     Other Triggers of Asthma  Cold air:  Cover your nose and mouth with a scarf.  Sometimes laughing or crying can be a trigger.  Some medicines and food can trigger asthma.

## 2017-12-15 ASSESSMENT — ASTHMA QUESTIONNAIRES: ACT_TOTALSCORE: 25

## 2017-12-26 DIAGNOSIS — I10 HYPERTENSION WITH TARGET BLOOD PRESSURE GOAL UNDER 150/90: ICD-10-CM

## 2017-12-26 RX ORDER — LISINOPRIL 5 MG/1
TABLET ORAL
Qty: 90 TABLET | Refills: 3 | Status: SHIPPED | OUTPATIENT
Start: 2017-12-26 | End: 2018-06-19

## 2017-12-26 NOTE — TELEPHONE ENCOUNTER
Prescription approved per [facility] Refill Protocol.  Kenna Law RN

## 2018-02-12 ENCOUNTER — OFFICE VISIT (OUTPATIENT)
Dept: ORTHOPEDICS | Facility: CLINIC | Age: 83
End: 2018-02-12
Payer: COMMERCIAL

## 2018-02-12 VITALS — WEIGHT: 163 LBS | HEIGHT: 58 IN | RESPIRATION RATE: 18 BRPM | BODY MASS INDEX: 34.22 KG/M2

## 2018-02-12 DIAGNOSIS — M75.112 INCOMPLETE TEAR OF LEFT ROTATOR CUFF: Primary | ICD-10-CM

## 2018-02-12 DIAGNOSIS — M19.012 PRIMARY OSTEOARTHRITIS OF LEFT SHOULDER: ICD-10-CM

## 2018-02-12 DIAGNOSIS — M25.812 SHOULDER IMPINGEMENT, LEFT: ICD-10-CM

## 2018-02-12 PROCEDURE — 20610 DRAIN/INJ JOINT/BURSA W/O US: CPT | Mod: LT | Performed by: ORTHOPAEDIC SURGERY

## 2018-02-12 RX ORDER — TRIAMCINOLONE ACETONIDE 40 MG/ML
80 INJECTION, SUSPENSION INTRA-ARTICULAR; INTRAMUSCULAR ONCE
Qty: 2 ML | Refills: 0 | OUTPATIENT
Start: 2018-02-12 | End: 2018-02-12

## 2018-02-12 NOTE — NURSING NOTE
"Chief Complaint   Patient presents with     RECHECK     Left shoulder impingement, RCT and OA last injection 7/27/17.       Initial Resp 18  Ht 1.473 m (4' 10\")  Wt 73.9 kg (163 lb)  BMI 34.07 kg/m2 Estimated body mass index is 34.07 kg/(m^2) as calculated from the following:    Height as of this encounter: 1.473 m (4' 10\").    Weight as of this encounter: 73.9 kg (163 lb).  Medication Reconciliation: complete   Aelah Zafar MA      "

## 2018-02-12 NOTE — PROGRESS NOTES
Follow up left shoulder impingement, partial rotator cuff tear, osteoarthritis..  Patient desires injection today of left shoulder.  Injection 1/16/17, 5/25/17, 7/27/17 in subacromial space with 80 mg kenolog worked well.  Risks, benefits, potential complications and alternatives were discussed.   With the patient's consent, sterile prep was performed of left shoulder.  Left shoulder was injected with Kenalog 80 mg and lidocaine at shoulder subacromial space from the posterolateral approach .  Return to clinic as needed.

## 2018-02-12 NOTE — LETTER
2/12/2018         RE: Kelly Mansfield  [address]   [facility] [address]        Dear Colleague,    Thank you for referring your patient, Kelly Mansfield, to the [facility]. Please see a copy of my visit note below.    Follow up left shoulder impingement, partial rotator cuff tear, osteoarthritis..  Patient desires injection today of left shoulder.  Injection 1/16/17, 5/25/17, 7/27/17 in subacromial space with 80 mg kenolog worked well.  Risks, benefits, potential complications and alternatives were discussed.   With the patient's consent, sterile prep was performed of left shoulder.  Left shoulder was injected with Kenalog 80 mg and lidocaine at shoulder subacromial space from the posterolateral approach .  Return to clinic as needed.      The patient's left shoulder was prepped with betadine solution after verification of allergies. Area approximately 10 cm x 10 cm prepped in a sterile fashion. After injection, betadine removed with soap and water and band-aids applied.    1ml kenalog with 1% lidocaine plain injected into patient's left shoulder by Dr. Mitul Segura  LOT# [id number]  Exp. 2/19      Again, thank you for allowing me to participate in the care of your patient.        Sincerely,        Mitul Segura MD

## 2018-02-12 NOTE — PROGRESS NOTES
The patient's left shoulder was prepped with betadine solution after verification of allergies. Area approximately 10 cm x 10 cm prepped in a sterile fashion. After injection, betadine removed with soap and water and band-aids applied.    1ml kenalog with 1% lidocaine plain injected into patient's left shoulder by Dr. Mitul Segura  LOT# [id number]  Exp. 2/19

## 2018-02-12 NOTE — MR AVS SNAPSHOT
"              After Visit Summary   2/12/2018    Kelly Mansfield    MRN: [id number]           Patient Information     Date Of Birth          6/8/1931        Visit Information        Provider Department      2/12/2018 9:30 AM Mitul Segura MD [facility]        Care Instructions    You have had a steroid injection today.  For the first 2 hours there will likely be some numbing in the joint from the lidocaine.  This is a good sign, indicating that the injection is in the right place.  In 2 hours the lidocaine will wear off, and the joint will hurt like you had a shot.  Each day the cortisone makes it feel better.  It reaches peak effect in 2 weeks.  We expect it to last for 3 months.  You may resume regular activity when you feel ready.  If you are diabetic, your glucoses will be quite high for several days.            Follow-ups after your visit        Who to contact     If you have questions or need follow up information about today's clinic visit or your schedule please contact [facility] directly at [phone].  Normal or non-critical lab and imaging results will be communicated to you by MyChart, letter or phone within 4 business days after the clinic has received the results. If you do not hear from us within 7 days, please contact the clinic through [employer] or phone. If you have a critical or abnormal lab result, we will notify you by phone as soon as possible.  Submit refill requests through [employer] or call your pharmacy and they will forward the refill request to us. Please allow 3 business days for your refill to be completed.          Additional Information About Your Visit        [employer] Information     [employer] lets you send messages to your doctor, view your test results, renew your prescriptions, schedule appointments and more. To sign up, go to [email or web address] . Click on \"Log in\" on the left side of the screen, which will take you to the Welcome page. " "Then click on \"Sign up Now\" on the right side of the page.     You will be asked to enter the access code listed below, as well as some personal information. Please follow the directions to create your username and password.     Your access code is: [email or web address]  Expires: 3/14/2018 10:41 AM     Your access code will  in 90 days. If you need help or a new code, please call your [place] clinic or [phone].        Care EveryWhere ID     This is your Care EveryWhere ID. This could be used by other organizations to access your [place] medical records  [id number]        Your Vitals Were     Respirations Height BMI (Body Mass Index)             18 1.473 m (4' 10\") 34.07 kg/m2          Blood Pressure from Last 3 Encounters:   17 124/70   17 122/78   17 130/80    Weight from Last 3 Encounters:   18 73.9 kg (163 lb)   17 73.5 kg (162 lb)   17 80.7 kg (178 lb)              Today, you had the following     No orders found for display       Primary Care Provider Office Phone # Fax #    aTtianna Mota [phone] [address] [facility] [address]        Equal Access to Services     [facility]: Hadii juan aguilar hadasho Socharla, waaxda luqadaha, qaybta kaalmada adeegyada, lefty chen . So [facility] [phone].    ATENCIÓN: Si habla español, tiene a maria disposición servicios gratuitos de asistencia lingüística. Llame al [phone].    We comply with applicable federal civil rights laws and [place] laws. We do not discriminate on the basis of race, color, national origin, age, disability, sex, sexual orientation, or gender identity.            Thank you!     Thank you for choosing [facility]  for your care. Our goal is always to provide you with excellent care. Hearing back from our patients is one way we can continue to improve our services. Please take a few minutes to complete the written survey that you may " receive in the mail after your visit with us. Thank you!             Your Updated Medication List - Protect others around you: Learn how to safely use, store and throw away your medicines at www.disposemymeds.org.          This list is accurate as of 2/12/18  9:41 AM.  Always use your most recent med list.                   Brand Name Dispense Instructions for use Diagnosis    albuterol 108 (90 BASE) MCG/ACT Inhaler    PROAIR HFA/PROVENTIL HFA/VENTOLIN HFA    1 Inhaler    Inhale 2 puffs into the lungs every 4 hours as needed    Mild persistent asthma without complication       aspirin 81 MG tablet      Take by mouth daily    TIA (transient ischemic attack)       atorvastatin 20 MG tablet    LIPITOR    90 tablet    TAKE ONE TABLET BY MOUTH EVERY DAY (MUST HAVE FASTING LABS FOR FURTHER REFILLS)    Hyperlipidemia LDL goal <130       CALCIUM 1200 PO      1800mg daily        cholecalciferol 1000 UNIT tablet    vitamin D3     1 TABLET DAILY        clopidogrel 75 MG tablet    PLAVIX    90 tablet    Take 1 tablet (75 mg) by mouth daily    History of TIA (transient ischemic attack) and stroke       fluticasone 50 MCG/ACT spray    FLONASE    16 g    Spray 2 sprays into both nostrils 2 times daily    Nonallergic rhinitis       lisinopril 5 MG tablet    PRINIVIL/ZESTRIL    90 tablet    TAKE ONE TABLET BY MOUTH EVERY DAY    Hypertension with target blood pressure goal under 150/90       mometasone-formoterol 200-5 MCG/ACT oral inhaler    DULERA    1 Inhaler    Inhale 2 puffs into the lungs 2 times daily    Mild persistent asthma without complication       OCUVITE PO      6mg daily        order for DME     1 each    Equipment being ordered: colten Hose stockings    Venous stasis       polyethylene glycol powder    MIRALAX/GLYCOLAX     Take 1 capful by mouth daily

## 2018-04-21 ENCOUNTER — TRANSFERRED RECORDS (OUTPATIENT)
Dept: HEALTH INFORMATION MANAGEMENT | Facility: CLINIC | Age: 83
End: 2018-04-21

## 2018-05-01 ENCOUNTER — TRANSFERRED RECORDS (OUTPATIENT)
Dept: HEALTH INFORMATION MANAGEMENT | Facility: CLINIC | Age: 83
End: 2018-05-01

## 2018-05-18 ENCOUNTER — TELEPHONE (OUTPATIENT)
Dept: FAMILY MEDICINE | Facility: CLINIC | Age: 83
End: 2018-05-18

## 2018-05-18 NOTE — TELEPHONE ENCOUNTER
Reason for Call:  Other Request for patient information    Detailed comments: Caller would like to know if you will follow up on patient, last visit for insurance purposes and if there are any future appointments scheduled. Please call.    Phone Number Patient can be reached at: Other phone number: [phone]    Best Time: any    Can we leave a detailed message on this number? YES    Call taken on 5/18/2018 at 9:50 AM by Salma Box

## 2018-05-18 NOTE — TELEPHONE ENCOUNTER
Spoke with caller and informed patient does not have any upcoming appointments with PCP. Caller informed that patient will be calling to schedule in the near future. RN advised patient will be followed by PCP. RN confirmed last OV with PCP was 12/14/17.  No questions at this time.     Felicia Gonsalez RN

## 2018-05-21 ENCOUNTER — PATIENT OUTREACH (OUTPATIENT)
Dept: CARE COORDINATION | Facility: CLINIC | Age: 83
End: 2018-05-21

## 2018-05-21 ENCOUNTER — TELEPHONE (OUTPATIENT)
Dept: FAMILY MEDICINE | Facility: CLINIC | Age: 83
End: 2018-05-21

## 2018-05-21 ASSESSMENT — ACTIVITIES OF DAILY LIVING (ADL): DEPENDENT_IADLS:: TRANSPORTATION;CLEANING;COOKING;SHOPPING

## 2018-05-21 NOTE — TELEPHONE ENCOUNTER
Reason for Call: Request for an order or referral:    Order or referral being requested: calling to report INR= 1.4. Needing new dosing orders and when to check next.    Date needed: as soon as possible    Has the patient been seen by the PCP for this problem? YES    Additional comments: verbal ok    Phone number Patient can be reached at:  Other phone number:  [phone]    Best Time:  Any time    Can we leave a detailed message on this number?  YES    Call taken on 5/21/2018 at 11:08 AM by Loyda Bustos

## 2018-05-21 NOTE — PROGRESS NOTES
SUBJECTIVE:   Kelly Mansfield is a 86 year old female who presents to clinic today for the following health issues:          Hospital Follow-up Visit:    Hospital/Nursing Home/IP Rehab Facility: [facility]  Date of Admission: April 21, 2018  Date of Discharge: May 1, 2018  Reason(s) for Admission: Partial small bowel obstruction (HC) (Primary Dx);   Small bowel obstruction, partial (HC);   SBO (small bowel obstruction) (HC);   Paroxysmal atrial fibrillation (HC)            Problems taking medications regularly:  None       Medication changes since discharge: yes       Problems adhering to non-medication therapy:  None    Summary of hospitalization:  CareEverwhere information obtained and reviewed  Diagnostic Tests/Treatments reviewed.  Follow up needed: yes-1 month  Other Healthcare Providers Involved in Patient s Care:         Homecare  Update since discharge: improved.     Post Discharge Medication Reconciliation: discharge medications reconciled, continue medications without change.  Plan of care communicated with patient     Coding guidelines for this visit:  Type of Medical   Decision Making Face-to-Face Visit       within 7 Days of discharge Face-to-Face Visit        within 14 days of discharge   Moderate Complexity [id number]   High Complexity 27882 40664              Hyperlipidemia Follow-Up      Rate your low fat/cholesterol diet?: good    Taking statin?  Yes, no muscle aches from statin    Other lipid medications/supplements?:  none    Hypertension Follow-up      Outpatient blood pressures are not being checked.    Low Salt Diet: no added salt    Cerebrovascular Follow-up      Patient history: TIA    Residual symptoms: None    Worsened or new symptoms since last visit: No    Daily aspirin use: Yes    Hypertension controlled: Yes      Problem list and histories reviewed & adjusted, as indicated.  Additional history: as documented    Patient Active Problem List   Diagnosis      Osteopenia     Family history of malignant neoplasm of breast     Tendon cyst     Stiffness of joint, not elsewhere classified, forearm     Advanced directives, counseling/discussion     Pseudophakia, ou     Lumbar spinal stenosis     Diagnostic skin and sensitization tests     Nonallergic rhinitis     Mild persistent asthma     Health Care Home     History of depression     Sebaceous cyst     History of total knee replacement     TIA (transient ischemic attack)     Incomplete tear of left rotator cuff     Primary osteoarthritis of left shoulder     Posterior vitreous detachment, bilateral     Macular degeneration, dry, mild, ou     Hypertension with target blood pressure goal under 150/90     History of TIA (transient ischemic attack) and stroke     Shoulder impingement, left     Grief     Hyperlipidemia LDL goal <100     Cerebrovascular disease     SBO (small bowel obstruction)     H/O exploratory laparotomy     Paroxysmal atrial fibrillation (H)     Class 1 obesity due to excess calories with serious comorbidity and body mass index (BMI) of 31.0 to 31.9 in adult     Past Surgical History:   Procedure Laterality Date     APPENDECTOMY  age 12     ARTHROSCOPY KNEE RT/LT  08/99    right     C NONSPECIFIC PROCEDURE  57    tailbone cyst removed     C NONSPECIFIC PROCEDURE      Vein surgery     C TOTAL KNEE ARTHROPLASTY Right 6/3/15     Carpal tunnel surgery Bilateral      CATARACT IOL, RT/LT       COLONOSCOPY  09/02     ENT SURGERY      Skin lesions     ENT SURGERY  2-29-12    Removal lesion back of neck     HYSTERECTOMY, LEYDA  10/02     HYSTERECTOMY, LEYDA  2002    ca uterus     PHACOEMULSIFICATION WITH STANDARD INTRAOCULAR LENS IMPLANT  12/2008; 2/2009    right eye; left eye     SALPINGO OOPHORECTOMY,R/L/VAHE  10/02    Salpingo Oophorectomy/BILATERAL       Social History   Substance Use Topics     Smoking status: Former Smoker     Packs/day: 0.50     Years: 25.00     Quit date: 1/1/1991     Smokeless tobacco: Never Used      Alcohol use No     Family History   Problem Relation Age of Onset     HEART DISEASE Mother      Migraines Mother      unknown age     Unknown/Adopted Father      Unknown/Adopted Maternal Grandmother      Unknown/Adopted Maternal Grandfather      Unknown/Adopted Paternal Grandmother      Unknown/Adopted Paternal Grandfather      DIABETES Daughter      Breast Cancer Other      dads side     Depression Son      committed suicide 3/2010         Current Outpatient Prescriptions   Medication Sig Dispense Refill     albuterol (PROAIR HFA/PROVENTIL HFA/VENTOLIN HFA) 108 (90 BASE) MCG/ACT Inhaler Inhale 2 puffs into the lungs every 4 hours as needed 1 Inhaler 6     CALCIUM 1200 OR 1800mg daily       Digoxin 187.5 MCG TABS Take 187.5 mcg by mouth daily       DULERA 200-5 MCG/ACT oral inhaler INHALE TWO PUFFS BY MOUTH TWICE A DAY 13 g 11     fluticasone (FLONASE) 50 MCG/ACT spray Spray 2 sprays into both nostrils 2 times daily 16 g 6     furosemide (LASIX) 40 MG tablet Take 20 mg by mouth       lisinopril (PRINIVIL/ZESTRIL) 5 MG tablet TAKE ONE TABLET BY MOUTH EVERY DAY 90 tablet 3     metoprolol succinate (TOPROL-XL) 25 MG 24 hr tablet discontinue  0     metoprolol tartrate (LOPRESSOR) 25 MG tablet Take 0.5 tablets (12.5 mg) by mouth 2 times daily 45 tablet 3     OCUVITE OR 6mg daily       order for DME Equipment being ordered: colten Hose stockings 1 each 0     polyethylene glycol (MIRALAX/GLYCOLAX) powder Take 1 capful by mouth daily       potassium chloride (KLOR-CON) 20 MEQ Packet Take 20 mEq by mouth 2 times daily       VITAMIN D 1000 UNIT OR TABS 1 TABLET DAILY       Warfarin Sodium (COUMADIN PO) Take 2 mg by mouth       aspirin 81 MG tablet Take by mouth daily       atorvastatin (LIPITOR) 20 MG tablet TAKE ONE TABLET BY MOUTH EVERY DAY (Patient not taking: Reported on 5/22/2018) 90 tablet 2     [DISCONTINUED] Digoxin 187.5 MCG TABS Take 125 mcg by mouth       [DISCONTINUED] metoprolol succinate (TOPROL-XL) 25 MG 24 hr  tablet Take 0.5 tablets (12.5 mg) by mouth daily 30 tablet 3     [DISCONTINUED] metoprolol tartrate (LOPRESSOR) 25 MG tablet Take 25 mg by mouth       No Known Allergies  Recent Labs   Lab Test  12/14/17   1122  05/11/17   1018  10/24/16   0950   10/06/15   0931   11/28/14   1111  08/04/14   1151   11/27/13   1032   LDL  70   --   86   --   84   --   86   --    --   86   HDL  84   --   72   --   55   --   59   --    --   45*   TRIG  67   --   75   --   116   --   211*   --    --   174*   ALT   --   24   --    --    --    --   22   --    --   29   CR  0.70  0.77  0.78   < >   --    < >  0.80   --    < >  0.71   GFRESTIMATED  80  72  70   < >   --    < >  68   --    < >  79   GFRESTBLACK  >90  87  84   < >   --    < >  83   --    < >  >90   POTASSIUM  4.0  4.0  4.5   < >   --    < >  3.6   --    < >  4.0   TSH   --   1.38   --    --    --    --    --   3.01   --    --     < > = values in this interval not displayed.      BP Readings from Last 3 Encounters:   05/22/18 110/58   12/14/17 124/70   05/11/17 122/78    Wt Readings from Last 3 Encounters:   05/22/18 150 lb (68 kg)   02/12/18 163 lb (73.9 kg)   12/14/17 162 lb (73.5 kg)                  Labs reviewed in EPIC    Reviewed and updated as needed this visit by clinical staff  Tobacco  Allergies  Meds  Med Hx  Surg Hx  Fam Hx  Soc Hx      Reviewed and updated as needed this visit by Provider         ROS:  CONSTITUTIONAL: NEGATIVE for fever, chills, change in weight  INTEGUMENTARY/SKIN: NEGATIVE for worrisome rashes, moles or lesions  EYES: NEGATIVE for vision changes or irritation  ENT/MOUTH: NEGATIVE for ear, mouth and throat problems  RESP: NEGATIVE for significant cough or SOB  CV: NEGATIVE for chest pain, palpitations or peripheral edema  GI: NEGATIVE for nausea, abdominal pain, heartburn, or change in bowel habits  : NEGATIVE for frequency, dysuria, or hematuria  MUSCULOSKELETAL:arthritis  NEURO: NEGATIVE for weakness, dizziness or  paresthesias  ENDOCRINE: NEGATIVE for temperature intolerance, skin/hair changes  HEME: NEGATIVE for bleeding problems  PSYCHIATRIC: NEGATIVE for changes in mood or affect    OBJECTIVE:     /58  Pulse 65  Temp 97.9  F (36.6  C) (Oral)  Resp 18  Wt 150 lb (68 kg)  SpO2 98%  BMI 31.35 kg/m2  Body mass index is 31.35 kg/(m^2).  GENERAL:  no distress  EYES: Eyes grossly normal to inspection, PERRL and conjunctivae and sclerae normal  NECK: no adenopathy, no asymmetry, masses, or scars and thyroid normal to palpation  RESP: lungs clear to auscultation - no rales, rhonchi or wheezes  CV: regular rate and rhythm, normal S1 S2, no S3 or S4, no murmur, click or rub, no peripheral edema and peripheral pulses strong  ABDOMEN: soft, nontender, no hepatosplenomegaly, no masses and bowel sounds normal  MS: no gross musculoskeletal defects noted, no edema  MS: walks slowly with a walker  SKIN: no suspicious lesions or rashes  PSYCH: mentation appears normal, affect normal/bright    Diagnostic Test Results:  Results for orders placed or performed in visit on 05/22/18   INR   Result Value Ref Range    INR 1.50 (H) 0.86 - 1.14       ASSESSMENT/PLAN:         ICD-10-CM    1. SBO (small bowel obstruction) K56.609    2. H/O exploratory laparotomy Z98.890    3. Paroxysmal atrial fibrillation (H) I48.0 Digoxin level     INR CLINIC REFERRAL     INR     metoprolol succinate (TOPROL-XL) 25 MG 24 hr tablet     DISCONTINUED: metoprolol succinate (TOPROL-XL) 25 MG 24 hr tablet   4. History of TIA (transient ischemic attack) and stroke Z86.73    5. Hyperlipidemia LDL goal <130 E78.5    6. Hypertension with target blood pressure goal under 150/90 I10 Basic metabolic panel     metoprolol tartrate (LOPRESSOR) 25 MG tablet   7. Class 1 obesity due to excess calories with serious comorbidity and body mass index (BMI) of 31.0 to 31.9 in adult E66.09     Z68.31      See Pt Instructions  Pt will get INR check in 3-4 days-  Advised decrease  metoprolol to 12.5 mg BID as she feels tired  REcheck BP 1 month  Labs are pending   She feels better than before  No abdominal pain  Tatianna Mota MD  [facility]

## 2018-05-21 NOTE — PROGRESS NOTES
Clinic Care Coordination Contact  Care Coordination Communication         Clinical Data: Patient was hospitalized at [facility]  from 4/21/18 to 5/1/18 with diagnosis of small bowel obstruction with exploratory lap and lysis of adhesions.  [facility] Discharged to home on 5/18/18.       Home Care Contact:              Home Care Agency: [facility].               Contact name () and phone number: Steffanie  [phone]               Care Coordination contacted home care: Yes              Anticipated start of care date: 5/21/18    Patient Contact:               Introduced self and role of care coordination.               Discharge instructions were reviewed with patient/caregiver.               Do you have any questions about your medications? No              Follow up appointment is scheduled for 5/22/18 .              Provided 24 Hour Nurse Line and/or 24 Hour Appointment Scheduling: Yes              Home care has contacted patient: Yes              Patient questions/concerns: None    Patient states she has no pain. She states she is a little confused, this started after her hospital stay. She states her daughter is spending most of the day with her and then goes home in the evening.  She states her appetite is poor but is getting a little better.     Plan: RN/SW Care Coordinator will await notification from home care staff informing RN/SW Care Coordinator of patients discharge plans/needs. RN/SW Care Coordinator will review chart and outreach to home care every 4 weeks and as needed.      Claire Hays RN, CCM - Care Coordinator     5/21/2018    2:11 PM  [phone]

## 2018-05-21 NOTE — TELEPHONE ENCOUNTER
No INR referral in chart. No orders for Warfarin noted. Per med reconciliation there [facility] on 5/1/18. Called and left detailed message on [employer] with this information.    Also routing to provider to advise.    Keren Webber RN - BC

## 2018-05-21 NOTE — LETTER
Health Care Home - Access Care Plan    About Me  Patient Name:  Kelly Mansfield    YOB: 1931  Age:                             86 year old   Zohra MRN:            [id number] Telephone Information:     Home Phone [phone]   Mobile [phone]       Address:    [address] [place] [id number] Email address:  No e-mail address on record      Emergency Contact(s)  Name Relationship Lgl Grd Work Phone Home Phone Mobile Phone   1. KATERIN APODACA Daughter   [phone]    2. LUPE CUTLER Daughter    [phone]             Health Maintenance: Routine Health maintenance Reviewed: Up to date    My Access Plan  Medical Emergency 911   Questions or concerns during clinic hours Primary Clinic Line, I will call the clinic directly: [facility] [phone]   24 Hour Appointment Line [phone] or  [phone]) (toll free)   24 Hour Nurse Line [phone] (toll free)   Questions or concerns outside clinic hours 24 Hour Appointment Line, I will call the after-hours on-call line:   [facility] [phone] or [phone]) (toll-free)   Preferred Urgent Care     Preferred Hospital [facility]  [phone]   Preferred Pharmacy [facility] [address] [facility]     Behavioral Health Crisis Line The National Suicide Prevention Lifeline at 1-399.695.4693 or 911     My Care Team Members  Patient Care Team       Relationship Specialty Notifications Start End    Tatianna Mota MD PCP - General Family Practice  8/20/13     Phone: [phone] Fax: [phone] [address] [facility] [address]    Claire Hays RN Clinic Care Coordinator Primary Care - [facility]  5/21/18     Phone: [phone] Fax: [phone]               My Medical and Care Information  Problem List   Patient Active Problem List   Diagnosis     Osteopenia     Family history of malignant neoplasm of breast      Tendon cyst     Stiffness of joint, not elsewhere classified, forearm     Clear cell adenocarcinoma of uterus (H)     Advanced directives, counseling/discussion     Pseudophakia, ou     Lumbar spinal stenosis     Diagnostic skin and sensitization tests     Nonallergic rhinitis     Mild persistent asthma     Health Care Home     History of depression     Sebaceous cyst     History of total knee replacement     TIA (transient ischemic attack)     Incomplete tear of left rotator cuff     Primary osteoarthritis of left shoulder     Posterior vitreous detachment, bilateral     Macular degeneration, dry, mild, ou     Hypertension with target blood pressure goal under 150/90     History of TIA (transient ischemic attack) and stroke     Shoulder impingement, left     Grief     Hyperlipidemia LDL goal <100     Morbid obesity due to excess calories (H)     Cerebrovascular disease      Current Medications and Allergies:  See printed Medication Report

## 2018-05-22 ENCOUNTER — OFFICE VISIT (OUTPATIENT)
Dept: FAMILY MEDICINE | Facility: CLINIC | Age: 83
End: 2018-05-22
Payer: COMMERCIAL

## 2018-05-22 VITALS
HEART RATE: 65 BPM | WEIGHT: 150 LBS | OXYGEN SATURATION: 98 % | SYSTOLIC BLOOD PRESSURE: 110 MMHG | DIASTOLIC BLOOD PRESSURE: 58 MMHG | TEMPERATURE: 97.9 F | BODY MASS INDEX: 31.35 KG/M2 | RESPIRATION RATE: 18 BRPM

## 2018-05-22 DIAGNOSIS — E66.09 CLASS 1 OBESITY DUE TO EXCESS CALORIES WITH SERIOUS COMORBIDITY AND BODY MASS INDEX (BMI) OF 31.0 TO 31.9 IN ADULT: ICD-10-CM

## 2018-05-22 DIAGNOSIS — Z98.890 H/O EXPLORATORY LAPAROTOMY: ICD-10-CM

## 2018-05-22 DIAGNOSIS — I10 HYPERTENSION WITH TARGET BLOOD PRESSURE GOAL UNDER 150/90: ICD-10-CM

## 2018-05-22 DIAGNOSIS — I48.0 PAROXYSMAL ATRIAL FIBRILLATION (H): ICD-10-CM

## 2018-05-22 DIAGNOSIS — E78.5 HYPERLIPIDEMIA LDL GOAL <130: ICD-10-CM

## 2018-05-22 DIAGNOSIS — K56.609 SBO (SMALL BOWEL OBSTRUCTION) (H): Primary | ICD-10-CM

## 2018-05-22 DIAGNOSIS — E66.811 CLASS 1 OBESITY DUE TO EXCESS CALORIES WITH SERIOUS COMORBIDITY AND BODY MASS INDEX (BMI) OF 31.0 TO 31.9 IN ADULT: ICD-10-CM

## 2018-05-22 DIAGNOSIS — Z86.73 HISTORY OF TIA (TRANSIENT ISCHEMIC ATTACK) AND STROKE: ICD-10-CM

## 2018-05-22 PROBLEM — E66.01 MORBID OBESITY DUE TO EXCESS CALORIES (H): Status: RESOLVED | Noted: 2017-05-11 | Resolved: 2018-05-22

## 2018-05-22 LAB
ANION GAP SERPL CALCULATED.3IONS-SCNC: 7 MMOL/L (ref 3–14)
BUN SERPL-MCNC: 27 MG/DL (ref 7–30)
CALCIUM SERPL-MCNC: 9.7 MG/DL (ref 8.5–10.1)
CHLORIDE SERPL-SCNC: 105 MMOL/L (ref 94–109)
CO2 SERPL-SCNC: 27 MMOL/L (ref 20–32)
CREAT SERPL-MCNC: 0.62 MG/DL (ref 0.52–1.04)
DIGOXIN SERPL-MCNC: 1.2 UG/L (ref 0.5–2)
GFR SERPL CREATININE-BSD FRML MDRD: >90 ML/MIN/1.7M2
GLUCOSE SERPL-MCNC: 92 MG/DL (ref 70–99)
INR PPP: 1.5 (ref 0.86–1.14)
POTASSIUM SERPL-SCNC: 4.1 MMOL/L (ref 3.4–5.3)
SODIUM SERPL-SCNC: 139 MMOL/L (ref 133–144)

## 2018-05-22 PROCEDURE — 36415 COLL VENOUS BLD VENIPUNCTURE: CPT | Performed by: FAMILY MEDICINE

## 2018-05-22 PROCEDURE — 80162 ASSAY OF DIGOXIN TOTAL: CPT | Performed by: FAMILY MEDICINE

## 2018-05-22 PROCEDURE — 80048 BASIC METABOLIC PNL TOTAL CA: CPT | Performed by: FAMILY MEDICINE

## 2018-05-22 PROCEDURE — 99214 OFFICE O/P EST MOD 30 MIN: CPT | Performed by: FAMILY MEDICINE

## 2018-05-22 PROCEDURE — 85610 PROTHROMBIN TIME: CPT | Performed by: FAMILY MEDICINE

## 2018-05-22 RX ORDER — METOPROLOL SUCCINATE 25 MG/1
12.5 TABLET, EXTENDED RELEASE ORAL DAILY
Qty: 30 TABLET | Refills: 3 | Status: SHIPPED | OUTPATIENT
Start: 2018-05-22 | End: 2018-05-22

## 2018-05-22 RX ORDER — ATORVASTATIN CALCIUM 20 MG/1
20 TABLET, FILM COATED ORAL DAILY
Qty: 90 TABLET | Refills: 2 | Status: CANCELLED | OUTPATIENT
Start: 2018-05-22

## 2018-05-22 RX ORDER — METOPROLOL SUCCINATE 25 MG/1
TABLET, EXTENDED RELEASE ORAL
Refills: 0 | Status: SHIPPED
Start: 2018-05-22 | End: 2018-06-19

## 2018-05-22 RX ORDER — METOPROLOL TARTRATE 25 MG/1
25 TABLET, FILM COATED ORAL
COMMUNITY
Start: 2018-05-01 | End: 2018-05-22

## 2018-05-22 RX ORDER — FUROSEMIDE 40 MG
20 TABLET ORAL
COMMUNITY
Start: 2018-05-01 | End: 2018-06-13

## 2018-05-22 RX ORDER — METOPROLOL TARTRATE 25 MG/1
12.5 TABLET, FILM COATED ORAL 2 TIMES DAILY
Qty: 45 TABLET | Refills: 3 | Status: SHIPPED | OUTPATIENT
Start: 2018-05-22 | End: 2019-02-11

## 2018-05-22 RX ORDER — CLOPIDOGREL BISULFATE 75 MG/1
75 TABLET ORAL DAILY
Qty: 90 TABLET | Refills: 0 | Status: CANCELLED | OUTPATIENT
Start: 2018-05-22

## 2018-05-22 RX ORDER — POTASSIUM CHLORIDE 1.5 G/1.58G
20 POWDER, FOR SOLUTION ORAL 2 TIMES DAILY
COMMUNITY
End: 2018-06-13

## 2018-05-22 RX ORDER — POTASSIUM CHLORIDE 1.5 G/1.58G
20 POWDER, FOR SOLUTION ORAL 2 TIMES DAILY
Qty: 60 TABLET | Status: CANCELLED | OUTPATIENT
Start: 2018-05-22

## 2018-05-22 NOTE — MR AVS SNAPSHOT
After Visit Summary   5/22/2018    Kelly Mansfield    MRN: [id number]           Patient Information     Date Of Birth          6/8/1931        Visit Information        Provider Department      5/22/2018 11:50 AM Tatianna Mota MD [facility]        Today's Diagnoses     History of TIA (transient ischemic attack) and stroke    -  1    Hyperlipidemia LDL goal <130        SBO (small bowel obstruction)        H/O exploratory laparotomy        Paroxysmal atrial fibrillation (H)        Hypertension with target blood pressure goal under 150/90          Care Instructions    Take Extra 2 mg today of coumadin  Take 2 mg everyday except Sunday take   1  1/2 mg tablet  Follow up 1 month  Take care  Tatianna Mota MD      [facility]    If you have any questions regarding to your visit please contact your care team:       Team Red:   Clinic Hours Telephone Number   Dr. Opal Arango, NP   7am-7pm  Monday - Thursday   7am-5pm  Fridays  [phone]  (Appointment scheduling available 24/7)    Questions about your recent visit?   Team Line  [phone]   Urgent Care - [place] and [facility] - 11am-9pm Monday-Friday Saturday-Sunday- 9am-5pm   [place] - 5pm-9pm Monday-Friday Saturday-Sunday- 9am-5pm  [phone] - [place]  [phone] - [place]       What options do I have for a visit other than an office visit? We offer electronic visits (e-visits) and telephone visits, when medically appropriate.  Please check with your medical insurance to see if these types of visits are covered, as you will be responsible for any charges that are not paid by your insurance.      You can use kwiry (secure electronic communication) to access to your chart, send your primary care provider a message, or make an appointment. Ask a team member how to get started.     For a price quote for your services, please call our Consumer  "Brennan Line at [phone] or our Imaging Cost estimation line at [phone] (for imaging tests).  Discharged By:  ALICIA SADLER            Follow-ups after your visit        Additional Services     INR CLINIC REFERRAL       Your provider has referred you to INR Services.    Please be aware that coverage of these services is subject to the terms and limitations of your health insurance plan.  Call member services at your health plan with any benefit or coverage questions.    Indication for Anticoagulation: PAF  If nonstandard INR is desired, indicate goal range and explanation:   Expected Duration of Therapy: Lifetime                  Your next 10 appointments already scheduled     Aug 13, 2018  9:00 AM CDT   [facility] Pace [facility]   [facility]    [address] [address] [phone]              Who to contact     If you have questions or need follow up information about today's clinic visit or your schedule please contact [facility] directly at [phone].  Normal or non-critical lab and imaging results will be communicated to you by MyChart, letter or phone within 4 business days after the clinic has received the results. If you do not hear from us within 7 days, please contact the clinic through MyChart or phone. If you have a critical or abnormal lab result, we will notify you by phone as soon as possible.  Submit refill requests through [employer] or call your pharmacy and they will forward the refill request to us. Please allow 3 business days for your refill to be completed.          Additional Information About Your Visit        [employer] Information     [employer] lets you send messages to your doctor, view your test results, renew your prescriptions, schedule appointments and more. To sign up, go to [email or web address] . Click on \"Log in\" on the left side of the screen, which will take you to the Welcome page. Then click " "on \"Sign up Now\" on the right side of the page.     You will be asked to enter the access code listed below, as well as some personal information. Please follow the directions to create your username and password.     Your access code is: [email or web address]  Expires: 2018  1:19 PM     Your access code will  in 90 days. If you need help or a new code, please call your [facility] clinic or [phone].        Care EveryWhere ID     This is your Care EveryWhere ID. This could be used by other organizations to access your [facility] medical records  [id number]        Your Vitals Were     Pulse Temperature Respirations Pulse Oximetry BMI (Body Mass Index)       65 97.9  F (36.6  C) (Oral) 18 98% 31.35 kg/m2        Blood Pressure from Last 3 Encounters:   18 110/58   17 124/70   17 122/78    Weight from Last 3 Encounters:   18 150 lb (68 kg)   18 163 lb (73.9 kg)   17 162 lb (73.5 kg)              We Performed the Following     Basic metabolic panel     Digoxin level     INR CLINIC REFERRAL     INR          Today's Medication Changes          These changes are accurate as of 18  1:19 PM.  If you have any questions, ask your nurse or doctor.               Start taking these medicines.        Dose/Directions    metoprolol succinate 25 MG 24 hr tablet   Commonly known as:  TOPROL-XL   Used for:  Paroxysmal atrial fibrillation (H)   Started by:  Tatianna Mota MD        Dose:  12.5 mg   Take 0.5 tablets (12.5 mg) by mouth daily   Quantity:  30 tablet   Refills:  3         Stop taking these medicines if you haven't already. Please contact your care team if you have questions.     clopidogrel 75 MG tablet   Commonly known as:  PLAVIX   Stopped by:  Tatianna Mota MD                Where to get your medicines      These medications were sent to [facility] - [address]  [address], [place] [address]     Phone:  [phone]     " metoprolol succinate 25 MG 24 hr tablet                Primary Care Provider Office Phone # Fax #    Tatianna Mota [phone] [address] [address]        Equal Access to Services     JASON NIELSEN : Hadthad juan aguilar khushio Sojulianaali, waaxda luqadaha, qaybta kaalmada adeguadalupe, lefty woodruff laCaterinaviji bassett. So [facility] [phone].    ATENCIÓN: Si habla español, tiene a maria disposición servicios gratuitos de asistencia lingüística. Llame al [phone].    We comply with applicable federal civil rights laws and [place] laws. We do not discriminate on the basis of race, color, national origin, age, disability, sex, sexual orientation, or gender identity.            Thank you!     Thank you for choosing [facility]  for your care. Our goal is always to provide you with excellent care. Hearing back from our patients is one way we can continue to improve our services. Please take a few minutes to complete the written survey that you may receive in the mail after your visit with us. Thank you!             Your Updated Medication List - Protect others around you: Learn how to safely use, store and throw away your medicines at www.disposemymeds.org.          This list is accurate as of 5/22/18  1:19 PM.  Always use your most recent med list.                   Brand Name Dispense Instructions for use Diagnosis    albuterol 108 (90 Base) MCG/ACT Inhaler    PROAIR HFA/PROVENTIL HFA/VENTOLIN HFA    1 Inhaler    Inhale 2 puffs into the lungs every 4 hours as needed    Mild persistent asthma without complication       aspirin 81 MG tablet      Take by mouth daily    TIA (transient ischemic attack)       atorvastatin 20 MG tablet    LIPITOR    90 tablet    TAKE ONE TABLET BY MOUTH EVERY DAY    Hyperlipidemia LDL goal <130       CALCIUM 1200 PO      1800mg daily        cholecalciferol 1000 UNIT tablet    vitamin D3     1 TABLET DAILY        COUMADIN PO      Take 2 mg by  mouth        Digoxin 187.5 MCG Tabs      Take 125 mcg by mouth        DULERA 200-5 MCG/ACT oral inhaler   Generic drug:  mometasone-formoterol     13 g    INHALE TWO PUFFS BY MOUTH TWICE A DAY    Mild persistent asthma without complication       fluticasone 50 MCG/ACT spray    FLONASE    16 g    Spray 2 sprays into both nostrils 2 times daily    Nonallergic rhinitis       furosemide 40 MG tablet    LASIX     Take 20 mg by mouth        lisinopril 5 MG tablet    PRINIVIL/ZESTRIL    90 tablet    TAKE ONE TABLET BY MOUTH EVERY DAY    Hypertension with target blood pressure goal under 150/90       metoprolol succinate 25 MG 24 hr tablet    TOPROL-XL    30 tablet    Take 0.5 tablets (12.5 mg) by mouth daily    Paroxysmal atrial fibrillation (H)       metoprolol tartrate 25 MG tablet    LOPRESSOR     Take 25 mg by mouth        OCUVITE PO      6mg daily        order for DME     1 each    Equipment being ordered: colten Hose stockings    Venous stasis       polyethylene glycol powder    MIRALAX/GLYCOLAX     Take 1 capful by mouth daily        potassium chloride 20 MEQ Packet    KLOR-CON     Take 20 mEq by mouth 2 times daily

## 2018-05-22 NOTE — PATIENT INSTRUCTIONS
Take Extra 2 mg today of coumadin  Take 2 mg everyday except Sunday take   1  1/2 mg tablet  Follow up 1 month  Take care  Tatianna Mota MD      [facility]    If you have any questions regarding to your visit please contact your care team:       Team Red:   Clinic Hours Telephone Number   Dr. Opal Arango, NP   7am-7pm  Monday - Thursday   7am-5pm  Fridays  [phone]  (Appointment scheduling available 24/7)    Questions about your recent visit?   Team Line  [phone]   [facility] [place] [facility] 11am-9pm Monday-Friday Saturday-Sunday- 9am-5pm   [place] - 5pm-9pm Monday-Friday Saturday-Sunday- 9am-5pm  [phone] - [place]  [phone] - [place]       What options do I have for a visit other than an office visit? We offer electronic visits (e-visits) and telephone visits, when medically appropriate.  Please check with your medical insurance to see if these types of visits are covered, as you will be responsible for any charges that are not paid by your insurance.      You can use [employer] (secure electronic communication) to access to your chart, send your primary care provider a message, or make an appointment. Ask a team member how to get started.     For a price quote for your services, please call our Consumer Price Line at [phone] or our Imaging Cost estimation line at [phone] (for imaging tests).  Discharged By:  ALICIA SADLER

## 2018-05-22 NOTE — LETTER
[facility]  [address] [place] [address]    May 23, 2018    Kelly Mansfield  [address]   [place] [address]      Dear Kelly,    Electrolytes and kidney tests are normal.   Digoxin level is Good   Take care     Enclosed is a copy of your results.   Results for orders placed or performed in visit on 05/22/18   Digoxin level   Result Value Ref Range    Digoxin Level 1.2 0.5 - 2.0 ug/L   Basic metabolic panel   Result Value Ref Range    Sodium 139 133 - 144 mmol/L    Potassium 4.1 3.4 - 5.3 mmol/L    Chloride 105 94 - 109 mmol/L    Carbon Dioxide 27 20 - 32 mmol/L    Anion Gap 7 3 - 14 mmol/L    Glucose 92 70 - 99 mg/dL    Urea Nitrogen 27 7 - 30 mg/dL    Creatinine 0.62 0.52 - 1.04 mg/dL    GFR Estimate >90 >60 mL/min/1.7m2    GFR Estimate If Black >90 >60 mL/min/1.7m2    Calcium 9.7 8.5 - 10.1 mg/dL   INR   Result Value Ref Range    INR 1.50 (H) 0.86 - 1.14       If you have any questions or concerns, please call myself or my nurse at [phone].    Sincerely,    Tatianna Mota MD/[facility]

## 2018-05-23 ENCOUNTER — ANTICOAGULATION THERAPY VISIT (OUTPATIENT)
Dept: NURSING | Facility: CLINIC | Age: 83
End: 2018-05-23

## 2018-05-23 DIAGNOSIS — I48.91 ATRIAL FIBRILLATION (H): ICD-10-CM

## 2018-05-23 ASSESSMENT — ASTHMA QUESTIONNAIRES: ACT_TOTALSCORE: 23

## 2018-05-25 ENCOUNTER — ANTICOAGULATION THERAPY VISIT (OUTPATIENT)
Dept: NURSING | Facility: CLINIC | Age: 83
End: 2018-05-25
Payer: COMMERCIAL

## 2018-05-25 DIAGNOSIS — I48.91 ATRIAL FIBRILLATION (H): ICD-10-CM

## 2018-05-25 LAB — INR PPP: 1.9

## 2018-05-25 PROCEDURE — 99207 ZZC NO CHARGE NURSE ONLY: CPT

## 2018-05-25 RX ORDER — WARFARIN SODIUM 2 MG/1
TABLET ORAL
Qty: 40 TABLET | Refills: 0 | Status: SHIPPED | OUTPATIENT
Start: 2018-05-25 | End: 2018-06-28

## 2018-05-25 NOTE — PROGRESS NOTES
ANTICOAGULATION INITIAL CLINIC VISIT    Patient Name:  Kelly Mansfield  Date:  5/25/2018  Referred by: Dr. Mota  Contact Type:  Face to Face    SUBJECTIVE:  Coumadin education was completed today.  Topics covered include:  -Introduction to coumadin  -Proper Administration  -INR Testing   -Sign/Symptoms of Bleeding  -Signs/Symptoms of Clot Formation or Stroke  -Dietary Intake of Vitamin K  -Drug Interactions  -Anticoagulation Identification (bracelet, necklace or wallet card)  -Future Surgery  -Effects of Alcohol, Tobacco, and Exercise on Coumadin    Coumadin Education Booklet and Coumadin Identification Wallet Card were given to the patient.       Patient Findings     Positives Initiation of therapy, No Problem Findings    Comments Patient was started on Warfarin on 5/3/18. She has taken 15 mg in the last 7 days. Will increase by 7% to a total of 16 mg per week to bring INR >2.0.           OBJECTIVE    INR   Date Value Ref Range Status   05/25/2018 1.9  Final       ASSESSMENT / PLAN  INR assessment THER    Recheck INR In: 4 DAYS    INR Location Clinic      Anticoagulation Summary as of 5/25/2018     INR goal 2.0-3.0   Today's INR 1.9!   Warfarin maintenance plan 3 mg (2 mg x 1.5) on Mon, Fri; 2 mg (2 mg x 1) all other days   Full warfarin instructions 3 mg on Mon, Fri; 2 mg all other days   Weekly warfarin total 16 mg   Plan last modified Keren Webber RN (5/25/2018)   Next INR check 5/29/2018   Target end date Indefinite    Indications   Atrial fibrillation (H) [I48.91] [I48.91]         Anticoagulation Episode Summary     INR check location     Preferred lab     Send INR reminders to [facility]    Comments       Anticoagulation Care Providers     Provider Role Specialty Phone number    Tatianna Mota MD [facility] [phone]            See the Encounter Report to view Anticoagulation Flowsheet and Dosing Calendar (Go to Encounters tab in chart review, and find the Anticoagulation  Therapy Visit)    Dosage adjustment made based on physician directed care plan.    Keren Webber RN

## 2018-05-25 NOTE — MR AVS SNAPSHOT
Kelly Mansfield   5/25/2018 9:30 AM   Anticoagulation Therapy Visit    Description:  86 year old female   Provider:  ZEENAT ANTI COAG   Department:  [facility]           INR as of 5/25/2018     Today's INR 1.9!      Anticoagulation Summary as of 5/25/2018     INR goal 2.0-3.0   Today's INR 1.9!   Full warfarin instructions 3 mg on Mon, Fri; 2 mg all other days   Next INR check 5/29/2018    Indications   Atrial fibrillation (H) [I48.91] [I48.91]         Your next Anticoagulation Clinic appointment(s)     May 29, 2018  9:00 AM CDT   Anticoagulation Visit with ZEENAT ANTI COASANDRA   [facility]    [address] [address] [phone]              Contact Numbers     [facility]  Please call [phone] to cancel and/or reschedule your appointment   Please call [phone] with any problems or questions regarding your therapy.        May 2018 Details    Sun Mon Tue Wed Thu Fri Sat       1               2               3               4               5                 6               7               8               9               10               11               12                 13               14               15               16               17               18               19                 20               21               22               23               24               25      3 mg   See details      26      2 mg           27      2 mg         28      3 mg         29            30               31                  Date Details   05/25 This INR check       Date of next INR:  5/29/2018         How to take your warfarin dose     To take:  2 mg Take 1 of the 2 mg tablets.    To take:  3 mg Take 1.5 of the 2 mg tablets.

## 2018-05-29 ENCOUNTER — ANTICOAGULATION THERAPY VISIT (OUTPATIENT)
Dept: NURSING | Facility: CLINIC | Age: 83
End: 2018-05-29
Payer: COMMERCIAL

## 2018-05-29 DIAGNOSIS — I48.91 ATRIAL FIBRILLATION (H): ICD-10-CM

## 2018-05-29 LAB — INR POINT OF CARE: 2.6 (ref 0.86–1.14)

## 2018-05-29 PROCEDURE — 36416 COLLJ CAPILLARY BLOOD SPEC: CPT

## 2018-05-29 PROCEDURE — 85610 PROTHROMBIN TIME: CPT | Mod: QW

## 2018-05-29 PROCEDURE — 99207 ZZC NO CHARGE NURSE ONLY: CPT

## 2018-05-29 NOTE — MR AVS SNAPSHOT
Kelly Mansfield   5/29/2018 9:00 AM   Anticoagulation Therapy Visit    Description:  86 year old female   Provider:  ZEENAT ANTI COAG   Department:  [facility]           INR as of 5/29/2018     Today's INR 2.6      Anticoagulation Summary as of 5/29/2018     INR goal 2.0-3.0   Today's INR 2.6   Full warfarin instructions 3 mg on Mon, Fri; 2 mg all other days   Next INR check 6/1/2018    Indications   Atrial fibrillation (H) [I48.91] [I48.91]         Your next Anticoagulation Clinic appointment(s)     May 29, 2018  9:00 AM CDT   Anticoagulation Visit with  ANTI COAG   [facility]    [address] [place] [address] [phone]            Jun 01, 2018  9:30 AM CDT   Anticoagulation Visit with  ANTI COAG   [facility] ([facility]    [address] [address] [phone]              Contact Numbers     [facility]  Please call [phone] to cancel and/or reschedule your appointment   Please call [phone] with any problems or questions regarding your therapy.        May 2018 Details    Sun Mon Tue Wed Thu Fri Sat       1               2               3               4               5                 6               7               8               9               10               11               12                 13               14               15               16               17               18               19                 20               21               22               23               24               25               26                 27               28               29      2 mg   See details      30      2 mg         31      2 mg            Date Details   05/29 This INR check               How to take your warfarin dose     To take:  2 mg Take 1 of the 2 mg tablets.           June 2018 Details    Sun Mon Tue Wed Thu Fri Sat          1            2                 3               4                5               6               7               8               9                 10               11               12               13               14               15               16                 17               18               19               20               21               22               23                 24               25               26               27               28               29               30                Date Details   No additional details    Date of next INR:  6/1/2018         How to take your warfarin dose     To take:  3 mg Take 1.5 of the 2 mg tablets.

## 2018-05-29 NOTE — PROGRESS NOTES
ANTICOAGULATION FOLLOW-UP CLINIC VISIT    Patient Name:  Kelly Mansfield  Date:  5/29/2018  Contact Type:  Face to Face    SUBJECTIVE:     Patient Findings     Positives No Problem Findings           OBJECTIVE    INR Protime   Date Value Ref Range Status   05/29/2018 2.6 (A) 0.86 - 1.14 Final       ASSESSMENT / PLAN  INR assessment THER    Recheck INR In: 3 DAYS    INR Location Clinic      Anticoagulation Summary as of 5/29/2018     INR goal 2.0-3.0   Today's INR 2.6   Warfarin maintenance plan 3 mg (2 mg x 1.5) on Mon, Fri; 2 mg (2 mg x 1) all other days   Full warfarin instructions 3 mg on Mon, Fri; 2 mg all other days   Weekly warfarin total 16 mg   No change documented Keren Webber RN   Plan last modified Keren Webber RN (5/25/2018)   Next INR check 6/1/2018   Target end date Indefinite    Indications   Atrial fibrillation (H) [I48.91] [I48.91]         Anticoagulation Episode Summary     INR check location     Preferred lab     Send INR reminders to [facility]    Comments       Anticoagulation Care Providers     Provider Role Specialty Phone number    Tatianna Mota MD [facility] [phone]            See the Encounter Report to view Anticoagulation Flowsheet and Dosing Calendar (Go to Encounters tab in chart review, and find the Anticoagulation Therapy Visit)    Dosage adjustment made based on physician directed care plan.    Keren Webber RN

## 2018-06-01 ENCOUNTER — ANTICOAGULATION THERAPY VISIT (OUTPATIENT)
Dept: NURSING | Facility: CLINIC | Age: 83
End: 2018-06-01
Payer: COMMERCIAL

## 2018-06-01 DIAGNOSIS — I48.91 ATRIAL FIBRILLATION (H): ICD-10-CM

## 2018-06-01 LAB — INR POINT OF CARE: 2 (ref 0.86–1.14)

## 2018-06-01 PROCEDURE — 36416 COLLJ CAPILLARY BLOOD SPEC: CPT

## 2018-06-01 PROCEDURE — 85610 PROTHROMBIN TIME: CPT | Mod: QW

## 2018-06-01 PROCEDURE — 99207 ZZC NO CHARGE NURSE ONLY: CPT

## 2018-06-01 NOTE — MR AVS SNAPSHOT
Kelly Mansfield   6/1/2018 9:30 AM   Anticoagulation Therapy Visit    Description:  86 year old female   Provider:  ZEENAT ANTI COAG   Department:  [facility]           INR as of 6/1/2018     Today's INR 2.0      Anticoagulation Summary as of 6/1/2018     INR goal 2.0-3.0   Today's INR 2.0   Full warfarin instructions 3 mg on Mon, Fri; 2 mg all other days   Next INR check 6/8/2018    Indications   Atrial fibrillation (H) [I48.91] [I48.91]         Your next Anticoagulation Clinic appointment(s)     Jun 08, 2018 10:30 AM CDT   Anticoagulation Visit with ZEENAT ANTI COAG   [facility] ([facility]    [address] [address] [phone]              Contact Numbers     [facility]  Please call [phone] to cancel and/or reschedule your appointment   Please call [phone] with any problems or questions regarding your therapy.        June 2018 Details    Sun Mon Tue Wed Thu Fri Sat          1      3 mg   See details      2      2 mg           3      2 mg         4      3 mg         5      2 mg         6      2 mg         7      2 mg         8            9                 10               11               12               13               14               15               16                 17               18               19               20               21               22               23                 24               25               26               27               28               29               30                Date Details   06/01 This INR check       Date of next INR:  6/8/2018         How to take your warfarin dose     To take:  2 mg Take 1 of the 2 mg tablets.    To take:  3 mg Take 1.5 of the 2 mg tablets.

## 2018-06-01 NOTE — PROGRESS NOTES
ANTICOAGULATION FOLLOW-UP CLINIC VISIT    Patient Name:  Kelly Mansfield  Date:  6/1/2018  Contact Type:  Face to Face    SUBJECTIVE:     Patient Findings     Positives No Problem Findings           OBJECTIVE    INR Protime   Date Value Ref Range Status   06/01/2018 2.0 (A) 0.86 - 1.14 Final       ASSESSMENT / PLAN  INR assessment THER    Recheck INR In: 1 WEEK    INR Location Clinic      Anticoagulation Summary as of 6/1/2018     INR goal 2.0-3.0   Today's INR 2.0   Warfarin maintenance plan 3 mg (2 mg x 1.5) on Mon, Fri; 2 mg (2 mg x 1) all other days   Full warfarin instructions 3 mg on Mon, Fri; 2 mg all other days   Weekly warfarin total 16 mg   No change documented Keren Webber RN   Plan last modified Keren Webber RN (5/25/2018)   Next INR check 6/8/2018   Target end date Indefinite    Indications   Atrial fibrillation (H) [I48.91] [I48.91]         Anticoagulation Episode Summary     INR check location     Preferred lab     Send INR reminders to [facility]    Comments       Anticoagulation Care Providers     Provider Role Specialty Phone number    Tatianna Mota MD [facility] [phone]            See the Encounter Report to view Anticoagulation Flowsheet and Dosing Calendar (Go to Encounters tab in chart review, and find the Anticoagulation Therapy Visit)    Dosage adjustment made based on physician directed care plan.    Keren Webber RN

## 2018-06-05 ENCOUNTER — MEDICAL CORRESPONDENCE (OUTPATIENT)
Dept: HEALTH INFORMATION MANAGEMENT | Facility: CLINIC | Age: 83
End: 2018-06-05

## 2018-06-08 ENCOUNTER — ANTICOAGULATION THERAPY VISIT (OUTPATIENT)
Dept: NURSING | Facility: CLINIC | Age: 83
End: 2018-06-08
Payer: COMMERCIAL

## 2018-06-08 DIAGNOSIS — I48.91 ATRIAL FIBRILLATION (H): ICD-10-CM

## 2018-06-08 LAB — INR POINT OF CARE: 1.9 (ref 0.86–1.14)

## 2018-06-08 PROCEDURE — 85610 PROTHROMBIN TIME: CPT | Mod: QW

## 2018-06-08 PROCEDURE — 99207 ZZC NO CHARGE NURSE ONLY: CPT

## 2018-06-08 PROCEDURE — 36416 COLLJ CAPILLARY BLOOD SPEC: CPT

## 2018-06-08 NOTE — PROGRESS NOTES
ANTICOAGULATION FOLLOW-UP CLINIC VISIT    Patient Name:  Kelly Mansfield  Date:  6/8/2018  Contact Type:  Face to Face    SUBJECTIVE:     Patient Findings     Positives No Problem Findings    Comments Patient's INR's have been 1.9-2.0. I increased her maintenance dose by 1 mg or 6.6% today to bring her INR's more to mid range.            OBJECTIVE    INR Protime   Date Value Ref Range Status   06/08/2018 1.9 (A) 0.86 - 1.14 Final       ASSESSMENT / PLAN  INR assessment THER    Recheck INR In: 1 WEEK    INR Location Clinic      Anticoagulation Summary as of 6/8/2018     INR goal 2.0-3.0   Today's INR 1.9!   Warfarin maintenance plan 3 mg (2 mg x 1.5) on Mon, Wed, Fri; 2 mg (2 mg x 1) all other days   Full warfarin instructions 3 mg on Mon, Wed, Fri; 2 mg all other days   Weekly warfarin total 17 mg   Plan last modified Keren Webber RN (6/8/2018)   Next INR check 6/15/2018   Target end date Indefinite    Indications   Atrial fibrillation (H) [I48.91] [I48.91]         Anticoagulation Episode Summary     INR check location     Preferred lab     Send INR reminders to [facility]    Comments       Anticoagulation Care Providers     Provider Role Specialty Phone number    Tatianna Mota MD [facility] [phone]            See the Encounter Report to view Anticoagulation Flowsheet and Dosing Calendar (Go to Encounters tab in chart review, and find the Anticoagulation Therapy Visit)    Dosage adjustment made based on physician directed care plan.    Keren Webber RN

## 2018-06-08 NOTE — MR AVS SNAPSHOT
Kelly Mansfield   6/8/2018 10:30 AM   Anticoagulation Therapy Visit    Description:  87 year old female   Provider:  [facility]   Department:  [facility]           INR as of 6/8/2018     Today's INR 1.9!      Anticoagulation Summary as of 6/8/2018     INR goal 2.0-3.0   Today's INR 1.9!   Full warfarin instructions 3 mg on Mon, Wed, Fri; 2 mg all other days   Next INR check 6/15/2018    Indications   Atrial fibrillation (H) [I48.91] [I48.91]         Your next Anticoagulation Clinic appointment(s)     Jun 08, 2018 10:30 AM CDT   [facility]    [address] [facility] [facility] [id number] [phone]            Imtiaz 15, 2018 10:30 AM CDT   [facility]  [facility]    [address] [place] [id number] [phone]              Contact Numbers     [facility]  Please call [phone] to cancel and/or reschedule your appointment   Please call [phone] with any problems or questions regarding your therapy.        June 2018 Details    Sun Mon Tue Wed Thu Fri Sat          1               2                 3               4               5               6               7               8      3 mg   See details      9      2 mg           10      2 mg         11      3 mg         12      2 mg         13      3 mg         14      2 mg         15            16                 17               18               19               20               21               22               23                 24               25               26               27               28               29               30                Date Details   06/08 This INR check       Date of next INR:  6/15/2018         How to take your warfarin dose     To take:  2 mg Take 1 of the 2 mg tablets.    To take:  3 mg Take 1.5 of the 2 mg tablets.

## 2018-06-13 DIAGNOSIS — I10 HYPERTENSION WITH TARGET BLOOD PRESSURE GOAL UNDER 150/90: ICD-10-CM

## 2018-06-13 DIAGNOSIS — I48.91 ATRIAL FIBRILLATION, UNSPECIFIED TYPE (H): Primary | ICD-10-CM

## 2018-06-13 DIAGNOSIS — E87.6 HYPOKALEMIA: ICD-10-CM

## 2018-06-13 NOTE — TELEPHONE ENCOUNTER
Confirmed medication dosage is correct as listed in Nadya's note below. Medications teed up with correct dosage and instructions and ready to send to pharmacy. Unable to send to pharmacy - need diagnosis to associate with each medication. Routing to provider to advise.       Keren Webber RN - BC

## 2018-06-13 NOTE — TELEPHONE ENCOUNTER
Patient request refills to be sent to dr. Mota.    Lasix 20mg -1 and 1/2 per day for edema #45 lf 05/18/18  Digoxin 125mcg-1qd for AFIB #30 lf 05/18/18  Potassium Chloride ER 10meq -1qd #30 lf 05/18/18    Written by Dr. Aleksander Castañeda at [facility] when patient was in hospital last month. Questions, please call patient

## 2018-06-13 NOTE — TELEPHONE ENCOUNTER
Routing to provider to approve...dosages patient requested is different that what has been prescribed in the past.    Keren Webber, JULIO - BC

## 2018-06-14 DIAGNOSIS — E87.6 HYPOKALEMIA: Primary | ICD-10-CM

## 2018-06-14 RX ORDER — DIGOXIN 125 MCG
125 TABLET ORAL DAILY
Qty: 30 TABLET | Refills: 1 | Status: SHIPPED | OUTPATIENT
Start: 2018-06-14 | End: 2018-07-30

## 2018-06-14 RX ORDER — POTASSIUM CHLORIDE 750 MG/1
10 TABLET, EXTENDED RELEASE ORAL DAILY
Qty: 90 TABLET | Refills: 1 | Status: SHIPPED | OUTPATIENT
Start: 2018-06-14 | End: 2018-12-10

## 2018-06-14 RX ORDER — POTASSIUM CHLORIDE 1.5 G/1.58G
10 POWDER, FOR SOLUTION ORAL DAILY
Qty: 15 TABLET | Refills: 1 | Status: SHIPPED | OUTPATIENT
Start: 2018-06-14 | End: 2018-06-15

## 2018-06-14 RX ORDER — FUROSEMIDE 20 MG
30 TABLET ORAL DAILY
Qty: 45 TABLET | Refills: 1 | Status: SHIPPED | OUTPATIENT
Start: 2018-06-14 | End: 2018-06-19

## 2018-06-15 ENCOUNTER — ANTICOAGULATION THERAPY VISIT (OUTPATIENT)
Dept: NURSING | Facility: CLINIC | Age: 83
End: 2018-06-15
Payer: COMMERCIAL

## 2018-06-15 DIAGNOSIS — I48.91 ATRIAL FIBRILLATION, UNSPECIFIED TYPE (H): ICD-10-CM

## 2018-06-15 LAB — INR POINT OF CARE: 2 (ref 0.86–1.14)

## 2018-06-15 PROCEDURE — 85610 PROTHROMBIN TIME: CPT | Mod: QW

## 2018-06-15 PROCEDURE — 36416 COLLJ CAPILLARY BLOOD SPEC: CPT

## 2018-06-15 PROCEDURE — 99207 ZZC NO CHARGE NURSE ONLY: CPT

## 2018-06-15 NOTE — MR AVS SNAPSHOT
Kelly Mansfield   6/15/2018 10:30 AM   Anticoagulation Therapy Visit    Description:  87 year old female   Provider:  [facility]   Department:  [facility]           INR as of 6/15/2018     Today's INR 2.0      Anticoagulation Summary as of 6/15/2018     INR goal 2.0-3.0   Today's INR 2.0   Full warfarin instructions 3 mg on Mon, Wed, Fri; 2 mg all other days   Next INR check 6/29/2018    Indications   Atrial fibrillation (H) [I48.91] [I48.91]         Your next Anticoagulation Clinic appointment(s)     Jun 29, 2018 10:45 AM CDT   Anticoagulation Visit with [facility]TI [facility]   [facility]    [address] [address] [phone]              Contact Numbers     [facility]  Please call [phone] to cancel and/or reschedule your appointment   Please call [phone] with any problems or questions regarding your therapy.        June 2018 Details    Sun Mon Tue Wed Thu Fri Sat          1               2                 3               4               5               6               7               8               9                 10               11               12               13               14               15      3 mg   See details      16      2 mg           17      2 mg         18      3 mg         19      2 mg         20      3 mg         21      2 mg         22      3 mg         23      2 mg           24      2 mg         25      3 mg         26      2 mg         27      3 mg         28      2 mg         29            30                Date Details   06/15 This INR check       Date of next INR:  6/29/2018         How to take your warfarin dose     To take:  2 mg Take 1 of the 2 mg tablets.    To take:  3 mg Take 1.5 of the 2 mg tablets.

## 2018-06-15 NOTE — PROGRESS NOTES
ANTICOAGULATION FOLLOW-UP CLINIC VISIT    Patient Name:  Kelly Mansfield  Date:  6/15/2018  Contact Type:  Face to Face    SUBJECTIVE:     Patient Findings     Positives No Problem Findings           OBJECTIVE    INR Protime   Date Value Ref Range Status   06/15/2018 2.0 (A) 0.86 - 1.14 Final       ASSESSMENT / PLAN  INR assessment THER    Recheck INR In: 2 WEEKS    INR Location Clinic      Anticoagulation Summary as of 6/15/2018     INR goal 2.0-3.0   Today's INR 2.0   Warfarin maintenance plan 3 mg (2 mg x 1.5) on Mon, Wed, Fri; 2 mg (2 mg x 1) all other days   Full warfarin instructions 3 mg on Mon, Wed, Fri; 2 mg all other days   Weekly warfarin total 17 mg   No change documented Keren Webber RN   Plan last modified Keren Webber RN (6/8/2018)   Next INR check 6/29/2018   Target end date Indefinite    Indications   Atrial fibrillation (H) [I48.91] [I48.91]         Anticoagulation Episode Summary     INR check location     Preferred lab     Send INR reminders to [facility]    Comments       Anticoagulation Care Providers     Provider Role Specialty Phone number    Tatianna Mota MD [facility] [phone]            See the Encounter Report to view Anticoagulation Flowsheet and Dosing Calendar (Go to Encounters tab in chart review, and find the Anticoagulation Therapy Visit)    Dosage adjustment made based on physician directed care plan.    Keren Webber RN

## 2018-06-18 ENCOUNTER — MEDICAL CORRESPONDENCE (OUTPATIENT)
Dept: HEALTH INFORMATION MANAGEMENT | Facility: CLINIC | Age: 83
End: 2018-06-18

## 2018-06-18 NOTE — PROGRESS NOTES
SUBJECTIVE:   Kelly Mansfield is a 87 year old female who presents to clinic today for the following health issues:      Hyperlipidemia Follow-Up      Rate your low fat/cholesterol diet?: good    Taking statin?  Yes, no muscle aches from statin    Other lipid medications/supplements?:  none    Hypertension Follow-up      Outpatient blood pressures are being checked at home.  Results are 110/60.    Low Salt Diet: no added salt    Cerebrovascular Follow-up      No new weakness    Pt here for a follow up on her Blood Pressure      Amount of exercise or physical activity: 2-3 days/week for an average of 15-30 minutes    Problems taking medications regularly: No    Medication side effects: Possible confusion, headaches, and fatigue.     Diet: Watching Vitamin K - No added Salt, Watching the Greens               Problem list and histories reviewed & adjusted, as indicated.  Additional history: as documented    Patient Active Problem List   Diagnosis     Osteopenia     Family history of malignant neoplasm of breast     Tendon cyst     Stiffness of joint, not elsewhere classified, forearm     Advanced directives, counseling/discussion     Pseudophakia, ou     Lumbar spinal stenosis     Diagnostic skin and sensitization tests     Nonallergic rhinitis     Mild persistent asthma     Health Care Home     History of depression     Sebaceous cyst     History of total knee replacement     TIA (transient ischemic attack)     Incomplete tear of left rotator cuff     Primary osteoarthritis of left shoulder     Posterior vitreous detachment, bilateral     Macular degeneration, dry, mild, ou     Hypertension with target blood pressure goal under 150/90     History of TIA (transient ischemic attack) and stroke     Shoulder impingement, left     Grief     Hyperlipidemia LDL goal <100     Cerebrovascular disease     SBO (small bowel obstruction)     H/O exploratory laparotomy     Paroxysmal atrial fibrillation (H)     Class 1 obesity  due to excess calories with serious comorbidity and body mass index (BMI) of 31.0 to 31.9 in adult     Atrial fibrillation (H) [I48.91]     Past Surgical History:   Procedure Laterality Date     APPENDECTOMY  age 12     ARTHROSCOPY KNEE RT/LT  08/99    right     C NONSPECIFIC PROCEDURE  57    tailbone cyst removed     C NONSPECIFIC PROCEDURE      Vein surgery     C TOTAL KNEE ARTHROPLASTY Right 6/3/15     Carpal tunnel surgery Bilateral      CATARACT IOL, RT/LT       COLONOSCOPY  09/02     ENT SURGERY      Skin lesions     ENT SURGERY  2-29-12    Removal lesion back of neck     HYSTERECTOMY, LEYDA  10/02     HYSTERECTOMY, LEYDA  2002    ca uterus     PHACOEMULSIFICATION WITH STANDARD INTRAOCULAR LENS IMPLANT  12/2008; 2/2009    right eye; left eye     SALPINGO OOPHORECTOMY,R/L/VAHE  10/02    Salpingo Oophorectomy/BILATERAL       Social History   Substance Use Topics     Smoking status: Former Smoker     Packs/day: 0.50     Years: 25.00     Quit date: 1/1/1991     Smokeless tobacco: Never Used     Alcohol use No     Family History   Problem Relation Age of Onset     HEART DISEASE Mother      Migraines Mother      unknown age     Unknown/Adopted Father      Unknown/Adopted Maternal Grandmother      Unknown/Adopted Maternal Grandfather      Unknown/Adopted Paternal Grandmother      Unknown/Adopted Paternal Grandfather      Diabetes Daughter      Breast Cancer Other      dads side     Depression Son      committed suicide 3/2010         Current Outpatient Prescriptions   Medication Sig Dispense Refill     albuterol (PROAIR HFA/PROVENTIL HFA/VENTOLIN HFA) 108 (90 BASE) MCG/ACT Inhaler Inhale 2 puffs into the lungs every 4 hours as needed 1 Inhaler 6     atorvastatin (LIPITOR) 20 MG tablet TAKE ONE TABLET BY MOUTH EVERY DAY 90 tablet 2     digoxin (LANOXIN) 125 MCG tablet Take 1 tablet (125 mcg) by mouth daily 30 tablet 1     DULERA 200-5 MCG/ACT oral inhaler INHALE TWO PUFFS BY MOUTH TWICE A DAY 13 g 11     fluticasone  (FLONASE) 50 MCG/ACT spray Spray 2 sprays into both nostrils 2 times daily 16 g 6     furosemide (LASIX) 20 MG tablet Take 1 tablet (20 mg) by mouth daily 30 tablet 1     lisinopril (PRINIVIL/ZESTRIL) 5 MG tablet Take 1 tablet (5 mg) by mouth daily 2.5 mg daily 45 tablet 0     metoprolol tartrate (LOPRESSOR) 25 MG tablet Take 0.5 tablets (12.5 mg) by mouth 2 times daily 45 tablet 3     OCUVITE OR 6mg daily       potassium chloride SA (K-DUR/KLOR-CON M) 10 MEQ CR tablet Take 1 tablet (10 mEq) by mouth daily 90 tablet 1     warfarin (COUMADIN) 2 MG tablet Take 3 mg Mon/Fri and 2 mg rest of week (still determining maintenance dose) 40 tablet 0     CALCIUM 1200 OR 1800mg daily       order for DME Equipment being ordered: colten Hose stockings (Patient not taking: Reported on 6/19/2018) 1 each 0     polyethylene glycol (MIRALAX/GLYCOLAX) powder Take 1 capful by mouth daily       VITAMIN D 1000 UNIT OR TABS 1 TABLET DAILY       Warfarin Sodium (COUMADIN PO) Take 2 mg by mouth       [DISCONTINUED] furosemide (LASIX) 20 MG tablet Take 1.5 tablets (30 mg) by mouth daily 45 tablet 1     [DISCONTINUED] lisinopril (PRINIVIL/ZESTRIL) 5 MG tablet TAKE ONE TABLET BY MOUTH EVERY DAY 90 tablet 3     No Known Allergies  Recent Labs   Lab Test  05/22/18   1234  12/14/17   1122  05/11/17   1018  10/24/16   0950   10/06/15   0931   11/28/14   1111  08/04/14   1151   11/27/13   1032   LDL   --   70   --   86   --   84   --   86   --    --   86   HDL   --   84   --   72   --   55   --   59   --    --   45*   TRIG   --   67   --   75   --   116   --   211*   --    --   174*   ALT   --    --   24   --    --    --    --   22   --    --   29   CR  0.62  0.70  0.77  0.78   < >   --    < >  0.80   --    < >  0.71   GFRESTIMATED  >90  80  72  70   < >   --    < >  68   --    < >  79   GFRESTBLACK  >90  >90  87  84   < >   --    < >  83   --    < >  >90   POTASSIUM  4.1  4.0  4.0  4.5   < >   --    < >  3.6   --    < >  4.0   TSH   --    --   1.38    "--    --    --    --    --   3.01   --    --     < > = values in this interval not displayed.      BP Readings from Last 3 Encounters:   06/19/18 97/53   05/22/18 110/58   12/14/17 124/70    Wt Readings from Last 3 Encounters:   06/19/18 139 lb (63 kg)   05/22/18 150 lb (68 kg)   02/12/18 163 lb (73.9 kg)                  Labs reviewed in EPIC    Reviewed and updated as needed this visit by clinical staff       Reviewed and updated as needed this visit by Provider         ROS:  CONSTITUTIONAL: NEGATIVE for fever, chills, change in weight  ENT/MOUTH: NEGATIVE for ear, mouth and throat problems  RESP: NEGATIVE for significant cough or SOB  CV: NEGATIVE for chest pain, palpitations or peripheral edema  GI: NEGATIVE for nausea, abdominal pain, heartburn, or change in bowel habits  MUSCULOSKELETAL: NEGATIVE for significant arthralgias or myalgia  PSYCHIATRIC: NEGATIVE for changes in mood or affect  ROS otherwise negative    OBJECTIVE:     BP 97/53  Pulse 80  Temp 96.9  F (36.1  C) (Oral)  Resp 15  Ht 4' 10\" (1.473 m)  Wt 139 lb (63 kg)  SpO2 95%  BMI 29.05 kg/m2  Body mass index is 29.05 kg/(m^2).  GENERAL: alert and no distress  NECK: no adenopathy, no asymmetry, masses, or scars and thyroid normal to palpation  RESP: lungs clear to auscultation - no rales, rhonchi or wheezes  CV: regular rate and rhythm, normal S1 S2, no S3 or S4, no murmur, click or rub, no peripheral edema and peripheral pulses strong  ABDOMEN: soft, nontender, no hepatosplenomegaly, no masses and bowel sounds normal  MS: no gross musculoskeletal defects noted, no edema  PSYCH: mentation appears normal, affect normal/bright    Diagnostic Test Results:  Pt does INR with a INR nurse    ASSESSMENT/PLAN:       1. Hypertension with target blood pressure goal under 150/90  Advised decrease lasix to 20 mg daily  Decrease lisinopril to 2.5 mg daily-stop if BP is less than 120/80  - PAF COMPLETED  - furosemide (LASIX) 20 MG tablet; Take 1 tablet (20 " mg) by mouth daily  Dispense: 30 tablet; Refill: 1  - lisinopril (PRINIVIL/ZESTRIL) 5 MG tablet; Take 1 tablet (5 mg) by mouth daily 2.5 mg daily  Dispense: 45 tablet; Refill: 0  Follow up 10 days  2. Paroxysmal atrial fibrillation (H)  Pt on coumadin  - PAF COMPLETED    3. Cerebrovascular disease  Stable   - PAF COMPLETED    4. Loss of weight  Discussed she has lost weight since last visit  We discussed about her Diet  Advised to make sure she is  Eating regularly  Follow up recheck 10 days  Will monitor weight      Tatianna Mota MD  [facility]

## 2018-06-19 ENCOUNTER — OFFICE VISIT (OUTPATIENT)
Dept: FAMILY MEDICINE | Facility: CLINIC | Age: 83
End: 2018-06-19
Payer: COMMERCIAL

## 2018-06-19 VITALS
TEMPERATURE: 96.9 F | DIASTOLIC BLOOD PRESSURE: 53 MMHG | SYSTOLIC BLOOD PRESSURE: 97 MMHG | OXYGEN SATURATION: 95 % | HEIGHT: 58 IN | BODY MASS INDEX: 29.18 KG/M2 | WEIGHT: 139 LBS | RESPIRATION RATE: 15 BRPM | HEART RATE: 80 BPM

## 2018-06-19 DIAGNOSIS — I67.9 CEREBROVASCULAR DISEASE: ICD-10-CM

## 2018-06-19 DIAGNOSIS — I48.0 PAROXYSMAL ATRIAL FIBRILLATION (H): ICD-10-CM

## 2018-06-19 DIAGNOSIS — I10 HYPERTENSION WITH TARGET BLOOD PRESSURE GOAL UNDER 150/90: Primary | ICD-10-CM

## 2018-06-19 DIAGNOSIS — R63.4 LOSS OF WEIGHT: ICD-10-CM

## 2018-06-19 PROCEDURE — 99207 C PAF COMPLETED  NO CHARGE: CPT | Performed by: FAMILY MEDICINE

## 2018-06-19 PROCEDURE — 99214 OFFICE O/P EST MOD 30 MIN: CPT | Performed by: FAMILY MEDICINE

## 2018-06-19 RX ORDER — LISINOPRIL 5 MG/1
5 TABLET ORAL DAILY
Qty: 45 TABLET | Refills: 0 | Status: SHIPPED | OUTPATIENT
Start: 2018-06-19 | End: 2018-06-19

## 2018-06-19 RX ORDER — FUROSEMIDE 20 MG
20 TABLET ORAL DAILY
Qty: 30 TABLET | Refills: 1 | Status: SHIPPED | OUTPATIENT
Start: 2018-06-19 | End: 2018-11-12

## 2018-06-19 RX ORDER — LISINOPRIL 5 MG/1
TABLET ORAL
Qty: 45 TABLET | Refills: 0 | Status: SHIPPED | OUTPATIENT
Start: 2018-06-19 | End: 2018-06-28

## 2018-06-19 NOTE — PATIENT INSTRUCTIONS
Decrease Lasix to 20 mg daily  Decrease Lisinopril to 2.5 mg daily  Stop Lisinopril if Blood pressure less than 120/70  See Dr Mota 10 days  Tatianna Mota MD    [facility]    If you have any questions regarding to your visit please contact your care team:       Team Red:   Clinic Hours Telephone Number   Dr. Opal Arango, NP   7am-7pm  Monday - Thursday   7am-5pm  Fridays  [phone]  (Appointment scheduling available 24/7)    Questions about your recent visit?   Team Line  [phone]   Urgent [facility] - [place] - 11am-9pm Monday-Friday Saturday-Sunday- 9am-5pm   [place] - 5pm-9pm Monday-Friday Saturday-Sunday- 9am-5pm  [phone] - [place]  [phone] - [place]       What options do I have for a visit other than an office visit? We offer electronic visits (e-visits) and telephone visits, when medically appropriate.  Please check with your medical insurance to see if these types of visits are covered, as you will be responsible for any charges that are not paid by your insurance.      You can use [employer]Â® (secure electronic communication) to access to your chart, send your primary care provider a message, or make an appointment. Ask a team member how to get started.     For a price quote for your services, please call our Consumer Price Line at [phone] or our Imaging Cost estimation line at [phone] (for imaging tests).

## 2018-06-19 NOTE — MR AVS SNAPSHOT
After Visit Summary   6/19/2018    Kelly Mansfield    MRN: [id number]           Patient Information     Date Of Birth          6/8/1931        Visit Information        Provider Department      6/19/2018 11:10 AM Tatianna Mota MD [facility]        Today's Diagnoses     Asymptomatic postmenopausal status    -  1    Hypertension with target blood pressure goal under 150/90          Care Instructions    Decrease Lasix to 20 mg daily  Decrease Lisinopril to 2.5 mg daily  Stop Lisinopril if Blood pressure less than 120/70  See Dr Mota 10 days  Tatianna Mota MD    [facility]    If you have any questions regarding to your visit please contact your care team:       Team Red:   Clinic Hours Telephone Number   Dr. Opal Arango, NP   7am-7pm  Monday - Thursday   7am-5pm  Fridays  [phone]  (Appointment scheduling available 24/7)    Questions about your recent visit?   Team Line  [phone]   Urgent [facility] - [place] [facility] - 11am-9pm Monday-Friday Saturday-Sunday- 9am-5pm   [place] - 5pm-9pm Monday-Friday Saturday-Sunday- 9am-5pm  [phone] - [place]  [phone] - [place]       What options do I have for a visit other than an office visit? We offer electronic visits (e-visits) and telephone visits, when medically appropriate.  Please check with your medical insurance to see if these types of visits are covered, as you will be responsible for any charges that are not paid by your insurance.      You can use The Trade Desk (secure electronic communication) to access to your chart, send your primary care provider a message, or make an appointment. Ask a team member how to get started.     For a price quote for your services, please call our Consumer Price Line at [phone] or our Imaging Cost estimation line at [phone] (for imaging tests).              Follow-ups after your visit        Your  "next 10 appointments already scheduled     Jun 29, 2018 10:45 AM CDT   Anticoagulation Visit with KAREN ANTI COAG   [facility]    [address]  [place] [id number] [phone]            Aug 13, 2018  9:00 AM CDT   New Visit with Barrington Pace MD   [facility] ([facility]    [address]  [place] [address] [phone]              Future tests that were ordered for you today     Open Future Orders        Priority Expected Expires Ordered    DEXA HIP/PELVIS/SPINE - Future Routine  6/18/2019 6/19/2018            Who to contact     If you have questions or need follow up information about today's clinic visit or your schedule please contact [facility] directly at [phone].  Normal or non-critical lab and imaging results will be communicated to you by MyChart, letter or phone within 4 business days after the clinic has received the results. If you do not hear from us within 7 days, please contact the clinic through [employer] or phone. If you have a critical or abnormal lab result, we will notify you by phone as soon as possible.  Submit refill requests through Namely or call your pharmacy and they will forward the refill request to us. Please allow 3 business days for your refill to be completed.          Additional Information About Your Visit        MyChart Information     Namely lets you send messages to your doctor, view your test results, renew your prescriptions, schedule appointments and more. To sign up, go to [email or web address] . Click on \"Log in\" on the left side of the screen, which will take you to the Welcome page. Then click on \"Sign up Now\" on the right side of the page.     You will be asked to enter the access code listed below, as well as some personal information. Please follow the directions to create your username and password.     Your access code is: [email or web address]  Expires: " "2018  1:19 PM     Your access code will  in 90 days. If you need help or a new code, please call your [facility] clinic or [phone].        Care EveryWhere ID     This is your Care EveryWhere ID. This could be used by other organizations to access your [facility] medical records  [id number]        Your Vitals Were     Pulse Temperature Respirations Height Pulse Oximetry BMI (Body Mass Index)    80 96.9  F (36.1  C) (Oral) 15 4' 10\" (1.473 m) 95% 29.05 kg/m2       Blood Pressure from Last 3 Encounters:   18 97/53   18 110/58   17 124/70    Weight from Last 3 Encounters:   18 139 lb (63 kg)   18 150 lb (68 kg)   18 163 lb (73.9 kg)              We Performed the Following     PAF COMPLETED          Today's Medication Changes          These changes are accurate as of 18 11:54 AM.  If you have any questions, ask your nurse or doctor.               These medicines have changed or have updated prescriptions.        Dose/Directions    furosemide 20 MG tablet   Commonly known as:  LASIX   This may have changed:  how much to take   Used for:  Hypertension with target blood pressure goal under 150/90   Changed by:  Tatianna Mota MD        Dose:  20 mg   Take 1 tablet (20 mg) by mouth daily   Quantity:  30 tablet   Refills:  1       lisinopril 5 MG tablet   Commonly known as:  PRINIVIL/ZESTRIL   This may have changed:  See the new instructions.   Used for:  Hypertension with target blood pressure goal under 150/90   Changed by:  Tatianna Mota MD        Dose:  5 mg   Take 1 tablet (5 mg) by mouth daily 2.5 mg daily   Quantity:  45 tablet   Refills:  0         Stop taking these medicines if you haven't already. Please contact your care team if you have questions.     metoprolol succinate 25 MG 24 hr tablet   Commonly known as:  TOPROL-XL   Stopped by:  Tatianna Mota MD                Where to get your medicines      These medications were sent to [facility] - " [place], [place] - [address]  [address], [place] [address]     Phone:  [phone]     furosemide 20 MG tablet    lisinopril 5 MG tablet                Primary Care Provider Office Phone # Fax #    Tatianna Mota [phone] [address]  [place] [address]        Equal Access to Services     [facility]: Hadii aad ku hadasho Soomaali, waaxda luqadaha, qaybta kaalmada adeegyada, waxay idiin hayaan adeeg kharash la'aan ah. So [facility] [phone].    ATENCIÓN: Si habla espambar, tiene a maria disposición servicios gratuitos de asistencia lingüística. Llame al [phone].    We comply with applicable federal civil rights laws and [place] laws. We do not discriminate on the basis of race, color, national origin, age, disability, sex, sexual orientation, or gender identity.            Thank you!     Thank you for choosing [facility]  for your care. Our goal is always to provide you with excellent care. Hearing back from our patients is one way we can continue to improve our services. Please take a few minutes to complete the written survey that you may receive in the mail after your visit with us. Thank you!             Your Updated Medication List - Protect others around you: Learn how to safely use, store and throw away your medicines at www.disposemymeds.org.          This list is accurate as of 6/19/18 11:54 AM.  Always use your most recent med list.                   Brand Name Dispense Instructions for use Diagnosis    albuterol 108 (90 Base) MCG/ACT Inhaler    PROAIR HFA/PROVENTIL HFA/VENTOLIN HFA    1 Inhaler    Inhale 2 puffs into the lungs every 4 hours as needed    Mild persistent asthma without complication       atorvastatin 20 MG tablet    LIPITOR    90 tablet    TAKE ONE TABLET BY MOUTH EVERY DAY    Hyperlipidemia LDL goal <130       CALCIUM 1200 PO      1800mg daily        cholecalciferol 1000 UNIT tablet    vitamin D3     1 TABLET  DAILY        * COUMADIN PO      Take 2 mg by mouth        * warfarin 2 MG tablet    COUMADIN    40 tablet    Take 3 mg Mon/Fri and 2 mg rest of week (still determining maintenance dose)    Atrial fibrillation (H)       digoxin 125 MCG tablet    LANOXIN    30 tablet    Take 1 tablet (125 mcg) by mouth daily    Atrial fibrillation, unspecified type (H)       DULERA 200-5 MCG/ACT oral inhaler   Generic drug:  mometasone-formoterol     13 g    INHALE TWO PUFFS BY MOUTH TWICE A DAY    Mild persistent asthma without complication       fluticasone 50 MCG/ACT spray    FLONASE    16 g    Spray 2 sprays into both nostrils 2 times daily    Nonallergic rhinitis       furosemide 20 MG tablet    LASIX    30 tablet    Take 1 tablet (20 mg) by mouth daily    Hypertension with target blood pressure goal under 150/90       lisinopril 5 MG tablet    PRINIVIL/ZESTRIL    45 tablet    Take 1 tablet (5 mg) by mouth daily 2.5 mg daily    Hypertension with target blood pressure goal under 150/90       metoprolol tartrate 25 MG tablet    LOPRESSOR    45 tablet    Take 0.5 tablets (12.5 mg) by mouth 2 times daily    Hypertension with target blood pressure goal under 150/90       OCUVITE PO      6mg daily        order for DME     1 each    Equipment being ordered: colten Hose stockings    Venous stasis       polyethylene glycol powder    MIRALAX/GLYCOLAX     Take 1 capful by mouth daily        potassium chloride SA 10 MEQ CR tablet    K-DUR/KLOR-CON M    90 tablet    Take 1 tablet (10 mEq) by mouth daily    Hypokalemia       * Notice:  This list has 2 medication(s) that are the same as other medications prescribed for you. Read the directions carefully, and ask your doctor or other care provider to review them with you.

## 2018-06-20 ENCOUNTER — PATIENT OUTREACH (OUTPATIENT)
Dept: CARE COORDINATION | Facility: CLINIC | Age: 83
End: 2018-06-20

## 2018-06-20 ASSESSMENT — ACTIVITIES OF DAILY LIVING (ADL): DEPENDENT_IADLS:: TRANSPORTATION;CLEANING;COOKING;SHOPPING

## 2018-06-20 NOTE — PROGRESS NOTES
Clinic Care Coordination Contact  Care Team Conversations    Clinic care coordinator called ans spoke with home care.  Patient has discharged from PT but continues with skilled nursing weekly and home health aide for bathing.  [facility].    Patient was seen by PCP on 6/19/18.  Metoprolol was discontinued.  Lasix was decreased to 20 mg daily. Lisinopril is 2.5 mg daily.     Patient has lost 11 lbs since last visit. Patient is to return to see PCP in two weeks for blood pressure check.     Clinic care coordination will continue to follow with patient and home care.     Claire Hays RN, CCM - Care Coordinator     6/20/2018    11:40 AM  [phone]

## 2018-06-28 ENCOUNTER — ANTICOAGULATION THERAPY VISIT (OUTPATIENT)
Dept: NURSING | Facility: CLINIC | Age: 83
End: 2018-06-28
Payer: COMMERCIAL

## 2018-06-28 ENCOUNTER — OFFICE VISIT (OUTPATIENT)
Dept: FAMILY MEDICINE | Facility: CLINIC | Age: 83
End: 2018-06-28
Payer: COMMERCIAL

## 2018-06-28 VITALS
BODY MASS INDEX: 30.97 KG/M2 | DIASTOLIC BLOOD PRESSURE: 66 MMHG | OXYGEN SATURATION: 97 % | HEART RATE: 68 BPM | RESPIRATION RATE: 12 BRPM | TEMPERATURE: 96.6 F | WEIGHT: 148.2 LBS | SYSTOLIC BLOOD PRESSURE: 118 MMHG

## 2018-06-28 DIAGNOSIS — I48.91 ATRIAL FIBRILLATION, UNSPECIFIED TYPE (H): ICD-10-CM

## 2018-06-28 DIAGNOSIS — I10 HYPERTENSION WITH TARGET BLOOD PRESSURE GOAL UNDER 150/90: ICD-10-CM

## 2018-06-28 DIAGNOSIS — R63.4 LOSS OF WEIGHT: Primary | ICD-10-CM

## 2018-06-28 DIAGNOSIS — Z79.01 LONG TERM CURRENT USE OF ANTICOAGULANT THERAPY: ICD-10-CM

## 2018-06-28 LAB — INR POINT OF CARE: 2.2 (ref 0.86–1.14)

## 2018-06-28 PROCEDURE — 99207 ZZC NO CHARGE NURSE ONLY: CPT

## 2018-06-28 PROCEDURE — 85610 PROTHROMBIN TIME: CPT | Mod: QW

## 2018-06-28 PROCEDURE — 36416 COLLJ CAPILLARY BLOOD SPEC: CPT

## 2018-06-28 PROCEDURE — 99213 OFFICE O/P EST LOW 20 MIN: CPT | Performed by: FAMILY MEDICINE

## 2018-06-28 RX ORDER — WARFARIN SODIUM 2 MG/1
TABLET ORAL
Qty: 40 TABLET | Refills: 0 | Status: SHIPPED | OUTPATIENT
Start: 2018-06-28 | End: 2018-07-30

## 2018-06-28 ASSESSMENT — PAIN SCALES - GENERAL: PAINLEVEL: NO PAIN (0)

## 2018-06-28 NOTE — PATIENT INSTRUCTIONS
[facility] ([facility])    If you have any questions regarding to your visit please contact your care team:       Team Red:   Clinic Hours Telephone Number   Dr. Opal Arango NP   7am-7pm  Monday - Thursday   7am-5pm  Fridays  [phone]  (Appointment scheduling available 24/7)    Questions about your recent visit?   Team Line  [phone]   Urgent Care - [place] and [facility] - 11am-9pm Monday-Friday Saturday-Sunday- 9am-5pm   [place] - 5pm-9pm Monday-Friday Saturday-Sunday- 9am-5pm  [phone] - [place]  [phone] - [place]       What options do I have for a visit other than an office visit? We offer electronic visits (e-visits) and telephone visits, when medically appropriate.  Please check with your medical insurance to see if these types of visits are covered, as you will be responsible for any charges that are not paid by your insurance.      You can use Issuu (secure electronic communication) to access to your chart, send your primary care provider a message, or make an appointment. Ask a team member how to get started.     For a price quote for your services, please call our Consumer Price Line at [phone] or our Imaging Cost estimation line at [phone] (for imaging tests).    Renetta Simental MA

## 2018-06-28 NOTE — PROGRESS NOTES
ANTICOAGULATION FOLLOW-UP CLINIC VISIT    Patient Name:  Kelly Mansfield  Date:  6/28/2018  Contact Type:  Face to Face    SUBJECTIVE:     Patient Findings     Positives No Problem Findings           OBJECTIVE    INR Protime   Date Value Ref Range Status   06/28/2018 2.2 (A) 0.86 - 1.14 Final       ASSESSMENT / PLAN  INR assessment THER    Recheck INR In: 2 WEEKS    INR Location Clinic      Anticoagulation Summary as of 6/28/2018     INR goal 2.0-3.0   Today's INR 2.2   Warfarin maintenance plan 3 mg (2 mg x 1.5) on Mon, Wed, Fri; 2 mg (2 mg x 1) all other days   Full warfarin instructions 3 mg on Mon, Wed, Fri; 2 mg all other days   Weekly warfarin total 17 mg   No change documented Keren Webber RN   Plan last modified Keren Webber RN (6/8/2018)   Next INR check 7/12/2018   Target end date Indefinite    Indications   Atrial fibrillation (H) [I48.91] [I48.91]         Anticoagulation Episode Summary     INR check location     Preferred lab     Send INR reminders to [facility]    Comments       Anticoagulation Care Providers     Provider Role Specialty Phone number    Tatianna Mota MD [facility] [phone]            See the Encounter Report to view Anticoagulation Flowsheet and Dosing Calendar (Go to Encounters tab in chart review, and find the Anticoagulation Therapy Visit)    Dosage adjustment made based on physician directed care plan.    Keren Webber RN

## 2018-06-28 NOTE — PROGRESS NOTES
SUBJECTIVE:   Kelly Mansfield is a 87 year old female who presents to clinic today for the following health issues:    Chief Complaint   Patient presents with     Weight Check     receheck weight loss     Pt is doing much better   daughter not sure if the correct weight was recorded last week  Pt is doing well  They want to know if they can stop Lisinopril  Pt overall is beginning to feel better      Problem list and histories reviewed & adjusted, as indicated.  Additional history: as documented    Patient Active Problem List   Diagnosis     Osteopenia     Family history of malignant neoplasm of breast     Tendon cyst     Stiffness of joint, not elsewhere classified, forearm     Advanced directives, counseling/discussion     Pseudophakia, ou     Lumbar spinal stenosis     Diagnostic skin and sensitization tests     Nonallergic rhinitis     Mild persistent asthma     Health Care Home     History of depression     Sebaceous cyst     History of total knee replacement     TIA (transient ischemic attack)     Incomplete tear of left rotator cuff     Primary osteoarthritis of left shoulder     Posterior vitreous detachment, bilateral     Macular degeneration, dry, mild, ou     Hypertension with target blood pressure goal under 150/90     History of TIA (transient ischemic attack) and stroke     Shoulder impingement, left     Grief     Hyperlipidemia LDL goal <100     Cerebrovascular disease     SBO (small bowel obstruction)     H/O exploratory laparotomy     Paroxysmal atrial fibrillation (H)     Class 1 obesity due to excess calories with serious comorbidity and body mass index (BMI) of 31.0 to 31.9 in adult     Atrial fibrillation (H) [I48.91]     Loss of weight     Past Surgical History:   Procedure Laterality Date     APPENDECTOMY  age 12     ARTHROSCOPY KNEE RT/LT  08/99    right     C NONSPECIFIC PROCEDURE  57    tailbone cyst removed     C NONSPECIFIC PROCEDURE      Vein surgery     C TOTAL KNEE ARTHROPLASTY  Right 6/3/15     Carpal tunnel surgery Bilateral      CATARACT IOL, RT/LT       COLONOSCOPY  09/02     ENT SURGERY      Skin lesions     ENT SURGERY  2-29-12    Removal lesion back of neck     HYSTERECTOMY, LEYDA  10/02     HYSTERECTOMY, LEYDA  2002    ca uterus     PHACOEMULSIFICATION WITH STANDARD INTRAOCULAR LENS IMPLANT  12/2008; 2/2009    right eye; left eye     SALPINGO OOPHORECTOMY,R/L/VAHE  10/02    Salpingo Oophorectomy/BILATERAL       Social History   Substance Use Topics     Smoking status: Former Smoker     Packs/day: 0.50     Years: 25.00     Quit date: 1/1/1991     Smokeless tobacco: Never Used     Alcohol use No     Family History   Problem Relation Age of Onset     HEART DISEASE Mother      Migraines Mother      unknown age     Unknown/Adopted Father      Unknown/Adopted Maternal Grandmother      Unknown/Adopted Maternal Grandfather      Unknown/Adopted Paternal Grandmother      Unknown/Adopted Paternal Grandfather      Diabetes Daughter      Breast Cancer Other      dads side     Depression Son      committed suicide 3/2010         Current Outpatient Prescriptions   Medication Sig Dispense Refill     albuterol (PROAIR HFA/PROVENTIL HFA/VENTOLIN HFA) 108 (90 BASE) MCG/ACT Inhaler Inhale 2 puffs into the lungs every 4 hours as needed 1 Inhaler 6     atorvastatin (LIPITOR) 20 MG tablet TAKE ONE TABLET BY MOUTH EVERY DAY 90 tablet 2     digoxin (LANOXIN) 125 MCG tablet Take 1 tablet (125 mcg) by mouth daily 30 tablet 1     DULERA 200-5 MCG/ACT oral inhaler INHALE TWO PUFFS BY MOUTH TWICE A DAY 13 g 11     fluticasone (FLONASE) 50 MCG/ACT spray Spray 2 sprays into both nostrils 2 times daily 16 g 6     furosemide (LASIX) 20 MG tablet Take 1 tablet (20 mg) by mouth daily 30 tablet 1     metoprolol tartrate (LOPRESSOR) 25 MG tablet Take 0.5 tablets (12.5 mg) by mouth 2 times daily 45 tablet 3     OCUVITE OR 6mg daily       polyethylene glycol (MIRALAX/GLYCOLAX) powder Take 1 capful by mouth daily        potassium chloride SA (K-DUR/KLOR-CON M) 10 MEQ CR tablet Take 1 tablet (10 mEq) by mouth daily 90 tablet 1     warfarin (COUMADIN) 2 MG tablet Take 3 mg Mon/Fri and 2 mg rest of week (still determining maintenance dose) 40 tablet 0     CALCIUM 1200 OR 1800mg daily       order for DME Equipment being ordered: colten Hose stockings (Patient not taking: Reported on 6/19/2018) 1 each 0     VITAMIN D 1000 UNIT OR TABS 1 TABLET DAILY       Warfarin Sodium (COUMADIN PO) Take 2 mg by mouth       No Known Allergies  Recent Labs   Lab Test  05/22/18   1234  12/14/17   1122  05/11/17   1018  10/24/16   0950   10/06/15   0931   11/28/14   1111  08/04/14   1151   11/27/13   1032   LDL   --   70   --   86   --   84   --   86   --    --   86   HDL   --   84   --   72   --   55   --   59   --    --   45*   TRIG   --   67   --   75   --   116   --   211*   --    --   174*   ALT   --    --   24   --    --    --    --   22   --    --   29   CR  0.62  0.70  0.77  0.78   < >   --    < >  0.80   --    < >  0.71   GFRESTIMATED  >90  80  72  70   < >   --    < >  68   --    < >  79   GFRESTBLACK  >90  >90  87  84   < >   --    < >  83   --    < >  >90   POTASSIUM  4.1  4.0  4.0  4.5   < >   --    < >  3.6   --    < >  4.0   TSH   --    --   1.38   --    --    --    --    --   3.01   --    --     < > = values in this interval not displayed.      BP Readings from Last 3 Encounters:   06/28/18 118/66   06/19/18 97/53   05/22/18 110/58    Wt Readings from Last 3 Encounters:   06/28/18 148 lb 3.2 oz (67.2 kg)   06/19/18 139 lb (63 kg)   05/22/18 150 lb (68 kg)                  Labs reviewed in EPIC    Reviewed and updated as needed this visit by clinical staff  Tobacco  Allergies  Meds       Reviewed and updated as needed this visit by Provider         ROS:  CONSTITUTIONAL: NEGATIVE for fever, chills, change in weight  ENT/MOUTH: NEGATIVE for ear, mouth and throat problems  RESP: NEGATIVE for significant cough or SOB  CV: NEGATIVE for chest  pain, palpitations or peripheral edema  GI: NEGATIVE for nausea, abdominal pain, heartburn, or change in bowel habits  MUSCULOSKELETAL: NEGATIVE for significant arthralgias or myalgia    OBJECTIVE:     /66  Pulse 68  Temp 96.6  F (35.9  C) (Oral)  Resp 12  Wt 148 lb 3.2 oz (67.2 kg)  SpO2 97%  BMI 30.97 kg/m2  Body mass index is 30.97 kg/(m^2).  GENERAL: healthy, alert and no distress  NECK: no adenopathy, no asymmetry, masses, or scars and thyroid normal to palpation  RESP: lungs clear to auscultation - no rales, rhonchi or wheezes  CV: regular rate and rhythm, normal S1 S2, no S3 or S4, no murmur, click or rub, no peripheral edema and peripheral pulses strong  ABDOMEN: soft, nontender, no hepatosplenomegaly, no masses and bowel sounds normal  MS: no gross musculoskeletal defects noted, no edema    Diagnostic Test Results:  Results for orders placed or performed in visit on 06/28/18   INR point of care   Result Value Ref Range    INR Protime 2.2 (A) 0.86 - 1.14       ASSESSMENT/PLAN:           1. Loss of weight  Weight stable   Follow up 3 months    2. Hypertension with target blood pressure goal under 150/90  Okay to stop Lisinopril    3. Long term current use of anticoagulant therapy  INR being done by INR Nurse and are Therapeutic  - warfarin (COUMADIN) 2 MG tablet; Take 3 mg Mon/Fri and 2 mg rest of week (still determining maintenance dose)  Dispense: 40 tablet; Refill: 0  [facility]

## 2018-06-28 NOTE — MR AVS SNAPSHOT
Kelly Mansfield   6/28/2018 10:15 AM   Anticoagulation Therapy Visit    Description:  87 year old female   Provider:  ZEENAT ANTI COAG   Department:  [facility]           INR as of 6/28/2018     Today's INR 2.2      Anticoagulation Summary as of 6/28/2018     INR goal 2.0-3.0   Today's INR 2.2   Full warfarin instructions 3 mg on Mon, Wed, Fri; 2 mg all other days   Next INR check 7/12/2018    Indications   Atrial fibrillation (H) [I48.91] [I48.91]         Your next Anticoagulation Clinic appointment(s)     Jun 28, 2018 10:15 AM CDT   Anticoagulation Visit with  ANTI COAG   [facility]    [address] [place] [id number] [phone]            Jul 12, 2018 10:15 AM CDT   Anticoagulation Visit with  ANTI COAG   [facility]    [address] [facility] [id number] [phone]              Contact Numbers     [facility]  Please call [phone] to cancel and/or reschedule your appointment   Please call [phone] with any problems or questions regarding your therapy.        June 2018 Details    Sun Mon Tue Wed Thu Fri Sat          1               2                 3               4               5               6               7               8               9                 10               11               12               13               14               15               16                 17               18               19               20               21               22               23                 24               25               26               27               28      2 mg   See details      29      3 mg         30      2 mg          Date Details   06/28 This INR check               How to take your warfarin dose     To take:  2 mg Take 1 of the 2 mg tablets.    To take:  3 mg Take 1.5 of the 2 mg tablets.           July 2018 Details    Sun Mon Tue Wed Thu Fri Sat     1      2 mg          2      3 mg         3      2 mg         4      3 mg         5      2 mg         6      3 mg         7      2 mg           8      2 mg         9      3 mg         10      2 mg         11      3 mg         12            13               14                 15               16               17               18               19               20               21                 22               23               24               25               26               27               28                 29               30               31                    Date Details   No additional details    Date of next INR:  7/12/2018         How to take your warfarin dose     To take:  2 mg Take 1 of the 2 mg tablets.    To take:  3 mg Take 1.5 of the 2 mg tablets.

## 2018-06-28 NOTE — MR AVS SNAPSHOT
After Visit Summary   6/28/2018    Kelly Mansfield    MRN: [id number]           Patient Information     Date Of Birth          6/8/1931        Visit Information        Provider Department      6/28/2018 10:40 AM Tatianna Mota MD [facility]        Care Instructions    [facility]    If you have any questions regarding to your visit please contact your care team:       Team Red:   Clinic Hours Telephone Number   Dr. Opal Arango NP   7am-7pm  Monday - Thursday   7am-5pm  Fridays  [phone]  (Appointment scheduling available 24/7)    Questions about your recent visit?   Team Line  [phone]   Urgent Care - [place] and [facility] - 11am-9pm Monday-Friday Saturday-Sunday- 9am-5pm   [place] - 5pm-9pm Monday-Friday Saturday-Sunday- 9am-5pm  [phone] - [place]  [phone] - [place]       What options do I have for a visit other than an office visit? We offer electronic visits (e-visits) and telephone visits, when medically appropriate.  Please check with your medical insurance to see if these types of visits are covered, as you will be responsible for any charges that are not paid by your insurance.      You can use [employer] (secure electronic communication) to access to your chart, send your primary care provider a message, or make an appointment. Ask a team member how to get started.     For a price quote for your services, please call our Consumer Price Line at [phone] or our Imaging Cost estimation line at [phone] (for imaging tests).    Renetta Simental MA            Follow-ups after your visit        Your next 10 appointments already scheduled     Jul 12, 2018 10:15 AM CDT   Antico[facility] with KAREN AN[facility] COAG   [facility]    [address] [address] [phone]            Aug 13, 2018  9:00 AM CDT   New Visit  with Barrington Pace MD   [facility]    [address]  [place] [address] [phone]              Who to contact     If you have questions or need follow up information about today's clinic visit or your schedule please contact [facility] directly at [phone].  Normal or non-critical lab and imaging results will be communicated to you by MyChart, letter or phone within 4 business days after the clinic has received the results. If you do not hear from us within 7 days, please contact the clinic through MyChart or phone. If you have a critical or abnormal lab result, we will notify you by phone as soon as possible.  Submit refill requests through [employer] or call your pharmacy and they will forward the refill request to us. Please allow 3 business days for your refill to be completed.          Additional Information About Your Visit        Care EveryWhere ID     This is your Care EveryWhere ID. This could be used by other organizations to access your [place] medical records  [id number]        Your Vitals Were     Pulse Temperature Respirations Pulse Oximetry BMI (Body Mass Index)       68 96.6  F (35.9  C) (Oral) 12 97% 30.97 kg/m2        Blood Pressure from Last 3 Encounters:   06/28/18 118/66   06/19/18 97/53   05/22/18 110/58    Weight from Last 3 Encounters:   06/28/18 148 lb 3.2 oz (67.2 kg)   06/19/18 139 lb (63 kg)   05/22/18 150 lb (68 kg)              Today, you had the following     No orders found for display         Today's Medication Changes          These changes are accurate as of 6/28/18 10:59 AM.  If you have any questions, ask your nurse or doctor.               Stop taking these medicines if you haven't already. Please contact your care team if you have questions.     lisinopril 5 MG tablet   Commonly known as:  PRINIVIL/ZESTRIL   Stopped by:  Tatianna Mota MD                    Primary Care Provider Office Phone # Fax #     Tatianna Mota [phone]       [address]  [place] [address]        Equal Access to Services     JASON NIELSEN : Hadii aad ku hadjenniferomid Norman, vicenta german, gladysfloyd persaudedilbertolavern coco, lefty waldemarin hayaan minotanner woodruff april bassett. So [facility] [phone].    ATENCIÓN: Si habla español, tiene a maria disposición servicios gratuitos de asistencia lingüística. Llame al [phone].    We comply with applicable federal civil rights laws and [place] laws. We do not discriminate on the basis of race, color, national origin, age, disability, sex, sexual orientation, or gender identity.            Thank you!     Thank you for choosing [facility]  for your care. Our goal is always to provide you with excellent care. Hearing back from our patients is one way we can continue to improve our services. Please take a few minutes to complete the written survey that you may receive in the mail after your visit with us. Thank you!             Your Updated Medication List - Protect others around you: Learn how to safely use, store and throw away your medicines at www.disposemymeds.org.          This list is accurate as of 6/28/18 10:59 AM.  Always use your most recent med list.                   Brand Name Dispense Instructions for use Diagnosis    albuterol 108 (90 Base) MCG/ACT Inhaler    PROAIR HFA/PROVENTIL HFA/VENTOLIN HFA    1 Inhaler    Inhale 2 puffs into the lungs every 4 hours as needed    Mild persistent asthma without complication       atorvastatin 20 MG tablet    LIPITOR    90 tablet    TAKE ONE TABLET BY MOUTH EVERY DAY    Hyperlipidemia LDL goal <130       CALCIUM 1200 PO      1800mg daily        cholecalciferol 1000 UNIT tablet    vitamin D3     1 TABLET DAILY        * COUMADIN PO      Take 2 mg by mouth        * warfarin 2 MG tablet    COUMADIN    40 tablet    Take 3 mg Mon/Fri and 2 mg rest of week (still determining maintenance dose)    Atrial fibrillation (H)       digoxin 125  MCG tablet    LANOXIN    30 tablet    Take 1 tablet (125 mcg) by mouth daily    Atrial fibrillation, unspecified type (H)       DULERA 200-5 MCG/ACT oral inhaler   Generic drug:  mometasone-formoterol     13 g    INHALE TWO PUFFS BY MOUTH TWICE A DAY    Mild persistent asthma without complication       fluticasone 50 MCG/ACT spray    FLONASE    16 g    Spray 2 sprays into both nostrils 2 times daily    Nonallergic rhinitis       furosemide 20 MG tablet    LASIX    30 tablet    Take 1 tablet (20 mg) by mouth daily    Hypertension with target blood pressure goal under 150/90       metoprolol tartrate 25 MG tablet    LOPRESSOR    45 tablet    Take 0.5 tablets (12.5 mg) by mouth 2 times daily    Hypertension with target blood pressure goal under 150/90       OCUVITE PO      6mg daily        order for DME     1 each    Equipment being ordered: colten Hose stockings    Venous stasis       polyethylene glycol powder    MIRALAX/GLYCOLAX     Take 1 capful by mouth daily        potassium chloride SA 10 MEQ CR tablet    K-DUR/KLOR-CON M    90 tablet    Take 1 tablet (10 mEq) by mouth daily    Hypokalemia       * Notice:  This list has 2 medication(s) that are the same as other medications prescribed for you. Read the directions carefully, and ask your doctor or other care provider to review them with you.

## 2018-07-12 ENCOUNTER — ANTICOAGULATION THERAPY VISIT (OUTPATIENT)
Dept: NURSING | Facility: CLINIC | Age: 83
End: 2018-07-12
Payer: COMMERCIAL

## 2018-07-12 DIAGNOSIS — I48.91 ATRIAL FIBRILLATION, UNSPECIFIED TYPE (H): ICD-10-CM

## 2018-07-12 LAB — INR POINT OF CARE: 2 (ref 0.86–1.14)

## 2018-07-12 PROCEDURE — 99207 ZZC NO CHARGE NURSE ONLY: CPT

## 2018-07-12 PROCEDURE — 85610 PROTHROMBIN TIME: CPT | Mod: QW

## 2018-07-12 PROCEDURE — 36416 COLLJ CAPILLARY BLOOD SPEC: CPT

## 2018-07-12 NOTE — MR AVS SNAPSHOT
Kelly Mansfield   7/12/2018 10:15 AM   Anticoagulation Therapy Visit    Description:  87 year old female   Provider:  ZEENAT ANTI COAG   Department:  [facility]           INR as of 7/12/2018     Today's INR 2.0      Anticoagulation Summary as of 7/12/2018     INR goal 2.0-3.0   Today's INR 2.0   Full warfarin instructions 3 mg on Mon, Wed, Fri; 2 mg all other days   Next INR check 7/27/2018    Indications   Atrial fibrillation (H) [I48.91] [I48.91]         Your next Anticoagulation Clinic appointment(s)     Jul 27, 2018 10:15 AM CDT   Anticoagulation Visit with ZEENAT ANTI COAG   [facility]    [address] [address] [phone]              Contact Numbers     [facility]  Please call [phone] to cancel and/or reschedule your appointment   Please call [phone] with any problems or questions regarding your therapy.        July 2018 Details    Sun Mon Tue Wed Thu Fri Sat     1               2               3               4               5               6               7                 8               9               10               11               12      2 mg   See details      13      3 mg         14      2 mg           15      2 mg         16      3 mg         17      2 mg         18      3 mg         19      2 mg         20      3 mg         21      2 mg           22      2 mg         23      3 mg         24      2 mg         25      3 mg         26      2 mg         27            28                 29               30               31                    Date Details   07/12 This INR check       Date of next INR:  7/27/2018         How to take your warfarin dose     To take:  2 mg Take 1 of the 2 mg tablets.    To take:  3 mg Take 1.5 of the 2 mg tablets.

## 2018-07-12 NOTE — PROGRESS NOTES
ANTICOAGULATION FOLLOW-UP CLINIC VISIT    Patient Name:  Kelly Mansfield  Date:  7/12/2018  Contact Type:  Face to Face    SUBJECTIVE:     Patient Findings     Positives No Problem Findings           OBJECTIVE    INR Protime   Date Value Ref Range Status   07/12/2018 2.0 (A) 0.86 - 1.14 Final       ASSESSMENT / PLAN  INR assessment THER    Recheck INR In: 2 WEEKS    INR Location Clinic      Anticoagulation Summary as of 7/12/2018     INR goal 2.0-3.0   Today's INR 2.0   Warfarin maintenance plan 3 mg (2 mg x 1.5) on Mon, Wed, Fri; 2 mg (2 mg x 1) all other days   Full warfarin instructions 3 mg on Mon, Wed, Fri; 2 mg all other days   Weekly warfarin total 17 mg   No change documented Keren Webber RN   Plan last modified Keren Webber RN (6/8/2018)   Next INR check 7/27/2018   Target end date Indefinite    Indications   Atrial fibrillation (H) [I48.91] [I48.91]         Anticoagulation Episode Summary     INR check location     Preferred lab     Send INR reminders to [facility]    Comments       Anticoagulation Care Providers     Provider Role Specialty Phone number    Tatianna Mota MD [facility] [phone]            See the Encounter Report to view Anticoagulation Flowsheet and Dosing Calendar (Go to Encounters tab in chart review, and find the Anticoagulation Therapy Visit)    Dosage adjustment made based on physician directed care plan.    Keren Webber RN

## 2018-07-17 ENCOUNTER — OFFICE VISIT (OUTPATIENT)
Dept: FAMILY MEDICINE | Facility: CLINIC | Age: 83
End: 2018-07-17
Payer: COMMERCIAL

## 2018-07-17 ENCOUNTER — TELEPHONE (OUTPATIENT)
Dept: FAMILY MEDICINE | Facility: CLINIC | Age: 83
End: 2018-07-17

## 2018-07-17 VITALS
DIASTOLIC BLOOD PRESSURE: 74 MMHG | HEIGHT: 58 IN | SYSTOLIC BLOOD PRESSURE: 124 MMHG | OXYGEN SATURATION: 97 % | WEIGHT: 146 LBS | TEMPERATURE: 97.2 F | RESPIRATION RATE: 12 BRPM | BODY MASS INDEX: 30.64 KG/M2 | HEART RATE: 78 BPM

## 2018-07-17 DIAGNOSIS — S90.112A CONTUSION OF LEFT GREAT TOE WITHOUT DAMAGE TO NAIL, INITIAL ENCOUNTER: Primary | ICD-10-CM

## 2018-07-17 DIAGNOSIS — Z96.651 HISTORY OF TOTAL KNEE REPLACEMENT, RIGHT: ICD-10-CM

## 2018-07-17 PROCEDURE — 99213 OFFICE O/P EST LOW 20 MIN: CPT | Performed by: FAMILY MEDICINE

## 2018-07-17 NOTE — PATIENT INSTRUCTIONS
[facility]    If you have any questions regarding to your visit please contact your care team:       Team Red:   Clinic Hours Telephone Number   Dr. Opal Arango, NP   7am-7pm  Monday - Thursday   7am-5pm  Fridays  [phone]  (Appointment scheduling available 24/7)    Questions about your recent visit?   Team Line  [phone]   Urgent Care - [place] and [facility] - 11am-9pm Monday-Friday Saturday-Sunday- 9am-5pm   [place] - 5pm-9pm Monday-Friday Saturday-Sunday- 9am-5pm  [phone] - [place]  [phone] - [place]       What options do I have for a visit other than an office visit? We offer electronic visits (e-visits) and telephone visits, when medically appropriate.  Please check with your medical insurance to see if these types of visits are covered, as you will be responsible for any charges that are not paid by your insurance.      You can use [employer] (secure electronic communication) to access to your chart, send your primary care provider a message, or make an appointment. Ask a team member how to get started.     For a price quote for your services, please call our Consumer Price Line at [phone] or our Imaging Cost estimation line at [phone] (for imaging tests).    Ligia MARES MA

## 2018-07-17 NOTE — TELEPHONE ENCOUNTER
Patient is looking for a pre-med for amoxicillin- not active in chart.     Thank you,  Laure Márquez, PharmD  [facility]  [phone]

## 2018-07-17 NOTE — MR AVS SNAPSHOT
After Visit Summary   7/17/2018    Kelly Mansfield    MRN: [id number]           Patient Information     Date Of Birth          6/8/1931        Visit Information        Provider Department      7/17/2018 12:10 PM Tatianna Mota MD [facility]        Today's Diagnoses     Contusion of left great toe without damage to nail, initial encounter    -  1      Care Instructions    [facility]    If you have any questions regarding to your visit please contact your care team:       Team Red:   Clinic Hours Telephone Number   Dr. Opal Arango, NP   7am-7pm  Monday - Thursday   7am-5pm  Fridays  [phone]  (Appointment scheduling available 24/7)    Questions about your recent visit?   Team Line  [phone]   Urgent Care - [place] and [facility] - 11am-9pm Monday-Friday Saturday-Sunday- 9am-5pm   [place] - 5pm-9pm Monday-Friday Saturday-Sunday- 9am-5pm  [phone] - [place]  [phone] - [place]       What options do I have for a visit other than an office visit? We offer electronic visits (e-visits) and telephone visits, when medically appropriate.  Please check with your medical insurance to see if these types of visits are covered, as you will be responsible for any charges that are not paid by your insurance.      You can use [employer] (secure electronic communication) to access to your chart, send your primary care provider a message, or make an appointment. Ask a team member how to get started.     For a price quote for your services, please call our Consumer Price Line at [phone] or our Imaging Cost estimation line at [phone] (for imaging tests).    Ligia MARES MA            Follow-ups after your visit        Additional Services     PODIATRY/FOOT & ANKLE SURGERY REFERRAL       Your provider has referred you to: [facility]: [facility] [phone]    [email or web address]    Please be aware that coverage of these services is subject to the terms and limitations of your health insurance plan.  Call member services at your health plan with any benefit or coverage questions.      Please bring the following to your appointment:  >>   Any x-rays, CTs or MRIs which have been performed.  Contact the facility where they were done to arrange for  prior to your scheduled appointment.  Any new CT, MRI or other procedures ordered by your specialist must be performed at a [place] facility or coordinated by your clinic's referral office.    >>   List of current medications   >>   This referral request   >>   Any documents/labs given to you for this referral                  Your next 10 appointments already scheduled     Jul 27, 2018 10:15 AM CDT   Anticoagulation Visit with FZ ANTI COAG   [facility]    [address] [place] [address]   [phone]            Aug 13, 2018  9:00 AM CDT   New Visit with Barrington Pace MD   [facility]    [address] [address]   [phone]              Future tests that were ordered for you today     Open Future Orders        Priority Expected Expires Ordered    XR Toe Left G/E 2 Views Routine 7/17/2018 7/17/2019 7/17/2018            Who to contact     If you have questions or need follow up information about today's clinic visit or your schedule please contact [facility] directly at [phone].  Normal or non-critical lab and imaging results will be communicated to you by MyChart, letter or phone within 4 business days after the clinic has received the results. If you do not hear from us within 7 days, please contact the clinic through MyChart or phone. If you have a critical or abnormal lab result, we will notify you by phone as soon as possible.  Submit refill requests through  "[employer] or call your pharmacy and they will forward the refill request to us. Please allow 3 business days for your refill to be completed.          Additional Information About Your Visit        Care EveryWhere ID     This is your Care EveryWhere ID. This could be used by other organizations to access your [place] medical records  [id number]        Your Vitals Were     Pulse Temperature Respirations Height Pulse Oximetry Breastfeeding?    78 97.2  F (36.2  C) (Oral) 12 4' 10\" (1.473 m) 97% No    BMI (Body Mass Index)                   30.51 kg/m2            Blood Pressure from Last 3 Encounters:   07/17/18 124/74   06/28/18 118/66   06/19/18 97/53    Weight from Last 3 Encounters:   07/17/18 146 lb (66.2 kg)   06/28/18 148 lb 3.2 oz (67.2 kg)   06/19/18 139 lb (63 kg)              We Performed the Following     PODIATRY/FOOT & ANKLE SURGERY REFERRAL          Today's Medication Changes          These changes are accurate as of 7/17/18 12:28 PM.  If you have any questions, ask your nurse or doctor.               These medicines have changed or have updated prescriptions.        Dose/Directions    metoprolol tartrate 25 MG tablet   Commonly known as:  LOPRESSOR   This may have changed:  when to take this   Used for:  Hypertension with target blood pressure goal under 150/90        Dose:  12.5 mg   Take 0.5 tablets (12.5 mg) by mouth 2 times daily   Quantity:  45 tablet   Refills:  3                Primary Care Provider Office Phone # Fax #    Tatianna Mota [phone] [address] [address]        Equal Access to Services     [facility]: Hadii juan burnette Socharla, waaxda luqadaha, qaybta kaalmalefty mackenzie . So [facility] [phone].    ATENCIÓN: Si habla español, tiene a maria disposición servicios gratuitos de asistencia lingüística. Llame al [phone].    We comply with applicable federal civil rights laws and [place] laws. We " do not discriminate on the basis of race, color, national origin, age, disability, sex, sexual orientation, or gender identity.            Thank you!     Thank you for choosing [facility]  for your care. Our goal is always to provide you with excellent care. Hearing back from our patients is one way we can continue to improve our services. Please take a few minutes to complete the written survey that you may receive in the mail after your visit with us. Thank you!             Your Updated Medication List - Protect others around you: Learn how to safely use, store and throw away your medicines at www.disposemymeds.org.          This list is accurate as of 7/17/18 12:28 PM.  Always use your most recent med list.                   Brand Name Dispense Instructions for use Diagnosis    albuterol 108 (90 Base) MCG/ACT Inhaler    PROAIR HFA/PROVENTIL HFA/VENTOLIN HFA    1 Inhaler    Inhale 2 puffs into the lungs every 4 hours as needed    Mild persistent asthma without complication       atorvastatin 20 MG tablet    LIPITOR    90 tablet    TAKE ONE TABLET BY MOUTH EVERY DAY    Hyperlipidemia LDL goal <130       digoxin 125 MCG tablet    LANOXIN    30 tablet    Take 1 tablet (125 mcg) by mouth daily    Atrial fibrillation, unspecified type (H)       DULERA 200-5 MCG/ACT oral inhaler   Generic drug:  mometasone-formoterol     13 g    INHALE TWO PUFFS BY MOUTH TWICE A DAY    Mild persistent asthma without complication       fluticasone 50 MCG/ACT spray    FLONASE    16 g    Spray 2 sprays into both nostrils 2 times daily    Nonallergic rhinitis       furosemide 20 MG tablet    LASIX    30 tablet    Take 1 tablet (20 mg) by mouth daily    Hypertension with target blood pressure goal under 150/90       metoprolol tartrate 25 MG tablet    LOPRESSOR    45 tablet    Take 0.5 tablets (12.5 mg) by mouth 2 times daily    Hypertension with target blood pressure goal under 150/90       OCUVITE PO      6mg daily         order for DME     1 each    Equipment being ordered: colten Hose stockings    Venous stasis       polyethylene glycol powder    MIRALAX/GLYCOLAX     Take 1 capful by mouth daily        potassium chloride SA 10 MEQ CR tablet    K-DUR/KLOR-CON M    90 tablet    Take 1 tablet (10 mEq) by mouth daily    Hypokalemia       warfarin 2 MG tablet    COUMADIN    40 tablet    Take 3 mg Mon/Fri and 2 mg rest of week (still determining maintenance dose)    Long term current use of anticoagulant therapy

## 2018-07-17 NOTE — PROGRESS NOTES
SUBJECTIVE:   Kelly Mansfield is a 87 year old female who presents to clinic today for the following health issues:        Chief Complaint   Patient presents with     Toe Pain     left great toe     X 2 months  No sure what she did  Hurts when she puts shoes on  No known trauma        Problem list and histories reviewed & adjusted, as indicated.  Additional history: as documented    Patient Active Problem List   Diagnosis     Osteopenia     Family history of malignant neoplasm of breast     Stiffness of joint, not elsewhere classified, forearm     Advanced directives, counseling/discussion     Pseudophakia, ou     Lumbar spinal stenosis     Diagnostic skin and sensitization tests     Nonallergic rhinitis     Mild persistent asthma     Health Care Home     History of depression     Sebaceous cyst     History of total knee replacement     TIA (transient ischemic attack)     Incomplete tear of left rotator cuff     Primary osteoarthritis of left shoulder     Posterior vitreous detachment, bilateral     Macular degeneration, dry, mild, ou     Hypertension with target blood pressure goal under 150/90     History of TIA (transient ischemic attack) and stroke     Shoulder impingement, left     Grief     Hyperlipidemia LDL goal <100     Cerebrovascular disease     SBO (small bowel obstruction)     H/O exploratory laparotomy     Paroxysmal atrial fibrillation (H)     Class 1 obesity due to excess calories with serious comorbidity and body mass index (BMI) of 31.0 to 31.9 in adult     Atrial fibrillation (H) [I48.91]     Loss of weight     Long term current use of anticoagulant therapy     Past Surgical History:   Procedure Laterality Date     APPENDECTOMY  age 12     ARTHROSCOPY KNEE RT/LT  08/99    right     C NONSPECIFIC PROCEDURE  57    tailbone cyst removed     C NONSPECIFIC PROCEDURE      Vein surgery     C TOTAL KNEE ARTHROPLASTY Right 6/3/15     Carpal tunnel surgery Bilateral      CATARACT IOL, RT/LT        COLONOSCOPY  09/02     ENT SURGERY      Skin lesions     ENT SURGERY  2-29-12    Removal lesion back of neck     HYSTERECTOMY, LEYDA  10/02     HYSTERECTOMY, LEYDA  2002    ca uterus     PHACOEMULSIFICATION WITH STANDARD INTRAOCULAR LENS IMPLANT  12/2008; 2/2009    right eye; left eye     SALPINGO OOPHORECTOMY,R/L/VAHE  10/02    Salpingo Oophorectomy/BILATERAL       Social History   Substance Use Topics     Smoking status: Former Smoker     Packs/day: 0.50     Years: 25.00     Quit date: 1/1/1991     Smokeless tobacco: Never Used     Alcohol use No     Family History   Problem Relation Age of Onset     HEART DISEASE Mother      Migraines Mother      unknown age     Unknown/Adopted Father      Unknown/Adopted Maternal Grandmother      Unknown/Adopted Maternal Grandfather      Unknown/Adopted Paternal Grandmother      Unknown/Adopted Paternal Grandfather      Diabetes Daughter      Breast Cancer Other      dads side     Depression Son      committed suicide 3/2010         Current Outpatient Prescriptions   Medication Sig Dispense Refill     albuterol (PROAIR HFA/PROVENTIL HFA/VENTOLIN HFA) 108 (90 BASE) MCG/ACT Inhaler Inhale 2 puffs into the lungs every 4 hours as needed 1 Inhaler 6     atorvastatin (LIPITOR) 20 MG tablet TAKE ONE TABLET BY MOUTH EVERY DAY 90 tablet 2     digoxin (LANOXIN) 125 MCG tablet Take 1 tablet (125 mcg) by mouth daily 30 tablet 1     DULERA 200-5 MCG/ACT oral inhaler INHALE TWO PUFFS BY MOUTH TWICE A DAY 13 g 11     fluticasone (FLONASE) 50 MCG/ACT spray Spray 2 sprays into both nostrils 2 times daily 16 g 6     furosemide (LASIX) 20 MG tablet Take 1 tablet (20 mg) by mouth daily 30 tablet 1     metoprolol tartrate (LOPRESSOR) 25 MG tablet Take 0.5 tablets (12.5 mg) by mouth 2 times daily (Patient taking differently: Take 12.5 mg by mouth daily ) 45 tablet 3     OCUVITE OR 6mg daily       polyethylene glycol (MIRALAX/GLYCOLAX) powder Take 1 capful by mouth daily       potassium chloride SA  "(K-DUR/KLOR-CON M) 10 MEQ CR tablet Take 1 tablet (10 mEq) by mouth daily 90 tablet 1     warfarin (COUMADIN) 2 MG tablet Take 3 mg Mon/Fri and 2 mg rest of week (still determining maintenance dose) 40 tablet 0     order for DME Equipment being ordered: colten Hose stockings (Patient not taking: Reported on 6/19/2018) 1 each 0     [DISCONTINUED] Warfarin Sodium (COUMADIN PO) Take 2 mg by mouth       No Known Allergies  Recent Labs   Lab Test  05/22/18   1234  12/14/17   1122  05/11/17   1018  10/24/16   0950   10/06/15   0931   11/28/14   1111  08/04/14   1151   11/27/13   1032   LDL   --   70   --   86   --   84   --   86   --    --   86   HDL   --   84   --   72   --   55   --   59   --    --   45*   TRIG   --   67   --   75   --   116   --   211*   --    --   174*   ALT   --    --   24   --    --    --    --   22   --    --   29   CR  0.62  0.70  0.77  0.78   < >   --    < >  0.80   --    < >  0.71   GFRESTIMATED  >90  80  72  70   < >   --    < >  68   --    < >  79   GFRESTBLACK  >90  >90  87  84   < >   --    < >  83   --    < >  >90   POTASSIUM  4.1  4.0  4.0  4.5   < >   --    < >  3.6   --    < >  4.0   TSH   --    --   1.38   --    --    --    --    --   3.01   --    --     < > = values in this interval not displayed.      BP Readings from Last 3 Encounters:   07/17/18 124/74   06/28/18 118/66   06/19/18 97/53    Wt Readings from Last 3 Encounters:   07/17/18 146 lb (66.2 kg)   06/28/18 148 lb 3.2 oz (67.2 kg)   06/19/18 139 lb (63 kg)                  Labs reviewed in EPIC    Reviewed and updated as needed this visit by clinical staff  Tobacco  Allergies  Meds  Med Hx  Surg Hx  Fam Hx  Soc Hx      Reviewed and updated as needed this visit by Provider         ROS:  Rest of the  Pertinent vROS is Negative except see above and Problem list [stable]      OBJECTIVE:     /74  Pulse 78  Temp 97.2  F (36.2  C) (Oral)  Resp 12  Ht 4' 10\" (1.473 m)  Wt 146 lb (66.2 kg)  SpO2 97%  Breastfeeding? No  " BMI 30.51 kg/m2  Body mass index is 30.51 kg/(m^2).  GENERAL: healthy, alert and no distress  Left Big toe  Some subungual Bruise  Mild tenderness   No swelling    Diagnostic Test Results:  none     ASSESSMENT/PLAN:     1. Contusion of left great toe without damage to nail, initial encounter  Advised tylenol  - XR Toe Left G/E 2 Views; Future  - PODIATRY/FOOT & ANKLE SURGERY REFERRAL  Wear Good shoes with good support  Follow up if not better  Tatianna Mota MD  [facility]

## 2018-07-18 NOTE — TELEPHONE ENCOUNTER
Left message on voicemail for patient to return call to RN hotline at # [phone].  Cynthia Cabezas RN

## 2018-07-19 RX ORDER — AMOXICILLIN 500 MG/1
2000 CAPSULE ORAL ONCE
Qty: 8 CAPSULE | Refills: 12 | Status: SHIPPED | OUTPATIENT
Start: 2018-07-19 | End: 2018-07-19

## 2018-07-19 NOTE — TELEPHONE ENCOUNTER
States she takes amoxicillin before she goes to the dentist. States it is related to her right total knee. Takes 4 at a time. Her next dental appt is not until Jan. Usually get's refills x 1 year. Please advise.    Erendira Richey RN  [facility], [place]

## 2018-07-25 ENCOUNTER — PATIENT OUTREACH (OUTPATIENT)
Dept: CARE COORDINATION | Facility: CLINIC | Age: 83
End: 2018-07-25

## 2018-07-25 ASSESSMENT — ACTIVITIES OF DAILY LIVING (ADL): DEPENDENT_IADLS:: TRANSPORTATION;CLEANING;COOKING;SHOPPING

## 2018-07-25 NOTE — PROGRESS NOTES
Clinic Care Coordination Contact    Clinic Care Coordination Contact  OUTREACH    Referral Information:       Primary Diagnosis: GI Disorders    Chief Complaint   Patient presents with     Clinic Care Coordination - Follow-up     RN        [place] Utilization:   Clinic Utilization  Difficulty keeping appointments:: No  Utilization    Last refreshed: 7/25/2018  7:53 AM:  No Show Count (past year) 0       Last refreshed: 7/25/2018  7:53 AM:  ED visits 0       Last refreshed: 7/25/2018  7:53 AM:  Hospital admissions 0          Current as of: 7/25/2018  7:53 AM         Clinical Concerns:    Current Medical Concerns:  Spoke with patient's daughter, Michelle. She is in the home with patient while patient showers.     Michelle states her mother continues to believe she just returned from the hospital. He confusion seems to be getting a little worse. She states she does still have good days.     Patient's great toe is sore when she bumps it. She is able to wear shoes but toe gets sore when she walks to far.        Current Behavioral Concerns: No concerns      Education Provided to patient: Explained clinic care coordination to daughter.      Pain  Chronic pain (GOAL):: No    Health Maintenance Reviewed: Due/Overdue Dexa scan    Clinical Pathway: None    Medication Management:  Daughter sets up meds and makes sure patient takes them daily     Functional Status:  Dependent ADLs:: Ambulation-walker  Dependent IADLs:: Transportation, Cleaning, Cooking, Shopping  Bed or wheelchair confined:: No  Mobility Status: Independent w/Device  Fallen 2 or more times in the past year?: No  Any fall with injury in the past year?: No    Living Situation:  Current living arrangement:: I live alone  Type of residence:: Apartment - handicap accessible    Daughter stops daily.      Diet/Exercise/Sleep:  Diet:: No added salt  Inadequate nutrition (GOAL):: No  Food Insecurity: No  Tube Feeding: No  Exercise:: Currently not exercising  Inadequate  activity/exercise (GOAL):: No  Significant changes in sleep pattern (GOAL): No    Transportation:  Transportation concerns (GOAL):: No  Transportation means:: Regular car - family     Psychosocial:  Denominational or spiritual beliefs that impact treatment:: No  Mental health DX:: No  Mental health management concern (GOAL):: No  Informal Support system:: Children     Financial/Insurance:   Financial/Insurance concerns (GOAL):: No       Resources and Interventions:  Current Resources: Home care is completed    Discussed with daughter that there are private pay home care agencies available to assist with bathing and other services.  They are doing fine currently, but Michelle is glad for information.     Community Resources: Housekeeping/Chore Agency     Equipment Currently Used at Home: walker, rolling    Advance Care Plan/Directive  Advanced Care Plans/Directives on file:: No    Referrals Placed: None     Patient/Caregiver understanding: Daughter expresses understanding    Outreach Frequency:   Future Appointments              In 2 days [facility]    In 2 weeks Barrington Pace MD [facility]          Plan: Provided clinic care coordination contact information to DaughterMichelle.  She agrees to call with future questions or concerns.    Will close to active care coordination at this time. Remain available for family.    Claire Hays RN, CCM - Care Coordinator     7/25/2018    3:24 PM  [phone]

## 2018-07-26 ENCOUNTER — MEDICAL CORRESPONDENCE (OUTPATIENT)
Dept: HEALTH INFORMATION MANAGEMENT | Facility: CLINIC | Age: 83
End: 2018-07-26

## 2018-07-27 ENCOUNTER — ANTICOAGULATION THERAPY VISIT (OUTPATIENT)
Dept: NURSING | Facility: CLINIC | Age: 83
End: 2018-07-27
Payer: COMMERCIAL

## 2018-07-27 DIAGNOSIS — I48.91 ATRIAL FIBRILLATION, UNSPECIFIED TYPE (H): ICD-10-CM

## 2018-07-27 LAB — INR POINT OF CARE: 1.6 (ref 0.86–1.14)

## 2018-07-27 PROCEDURE — 99207 ZZC NO CHARGE NURSE ONLY: CPT

## 2018-07-27 PROCEDURE — 85610 PROTHROMBIN TIME: CPT | Mod: QW

## 2018-07-27 PROCEDURE — 36416 COLLJ CAPILLARY BLOOD SPEC: CPT

## 2018-07-27 NOTE — PROGRESS NOTES
ANTICOAGULATION FOLLOW-UP CLINIC VISIT    Patient Name:  Kelly Mansfield  Date:  7/27/2018  Contact Type:  Face to Face    SUBJECTIVE:     Patient Findings     Positives Extra doses, Missed doses           OBJECTIVE    INR Protime   Date Value Ref Range Status   07/27/2018 1.6 (A) 0.86 - 1.14 Final       ASSESSMENT / PLAN  INR assessment SUB    Recheck INR In: 2 WEEKS    INR Location Clinic      Anticoagulation Summary as of 7/27/2018     INR goal 2.0-3.0   Today's INR 1.6!   Warfarin maintenance plan 3 mg (2 mg x 1.5) on Mon, Wed, Fri; 2 mg (2 mg x 1) all other days   Full warfarin instructions 7/27: 4 mg; Otherwise 3 mg on Mon, Wed, Fri; 2 mg all other days   Weekly warfarin total 17 mg   Plan last modified Keren Webebr RN (6/8/2018)   Next INR check 8/10/2018   Target end date Indefinite    Indications   Atrial fibrillation (H) [I48.91] [I48.91]         Anticoagulation Episode Summary     INR check location     Preferred lab     Send INR reminders to [facility] CL[facility]IC    Comments       Anticoagulation Care Providers     Provider Role Specialty Phone number    Tatianna Mota MD [facility] [phone]            See the Encounter Report to view Anticoagulation Flowsheet and Dosing Calendar (Go to Encounters tab in chart review, and find the Anticoagulation Therapy Visit)    Dosage adjustment made based on physician directed care plan.    Erendira Richey RN

## 2018-07-27 NOTE — MR AVS SNAPSHOT
Kelly Mansfield   7/27/2018 10:15 AM   Anticoagulation Therapy Visit    Description:  87 year old female   Provider:  [facility]   Department:  [facility]           INR as of 7/27/2018     Today's INR 1.6!      Anticoagulation Summary as of 7/27/2018     INR goal 2.0-3.0   Today's INR 1.6!   Full warfarin instructions 7/27: 4 mg; Otherwise 3 mg on Mon, Wed, Fri; 2 mg all other days   Next INR check 8/10/2018    Indications   Atrial fibrillation (H) [I48.91] [I48.91]         Your next Anticoagulation Clinic appointment(s)     Jul 27, 2018 10:15 AM CDT   [facility]    [address] [address] [phone]            Aug 10, 2018 11:00 AM CDT   Anticoagulation Visit with [facility]   [facility]    [address] [address] [phone]              Contact Numbers     [facility]  Please call [phone] to cancel and/or reschedule your appointment   Please call [phone] with any problems or questions regarding your therapy.        July 2018 Details    Sun Mon Tue Wed Thu Fri Sat     1               2               3               4               5               6               7                 8               9               10               11               12               13               14                 15               16               17               18               19               20               21                 22               23               24               25               26               27      4 mg   See details      28      2 mg           29      2 mg         30      3 mg         31      2 mg              Date Details   07/27 This INR check               How to take your warfarin dose     To take:  2 mg Take 1 of the 2 mg tablets.    To take:  3 mg Take 1.5 of the 2 mg tablets.    To take:  4 mg Take 2 of  the 2 mg tablets.           August 2018 Details    Sun Mon Tue Wed Thu Fri Sat        1      3 mg         2      2 mg         3      3 mg         4      2 mg           5      2 mg         6      3 mg         7      2 mg         8      3 mg         9      2 mg         10            11                 12               13               14               15               16               17               18                 19               20               21               22               23               24               25                 26               27               28               29               30               31                 Date Details   No additional details    Date of next INR:  8/10/2018         How to take your warfarin dose     To take:  2 mg Take 1 of the 2 mg tablets.    To take:  3 mg Take 1.5 of the 2 mg tablets.

## 2018-07-30 ENCOUNTER — TELEPHONE (OUTPATIENT)
Dept: FAMILY MEDICINE | Facility: CLINIC | Age: 83
End: 2018-07-30

## 2018-07-30 DIAGNOSIS — Z79.01 LONG TERM CURRENT USE OF ANTICOAGULANT THERAPY: ICD-10-CM

## 2018-07-30 RX ORDER — WARFARIN SODIUM 2 MG/1
TABLET ORAL
Qty: 102 TABLET | Refills: 0 | Status: SHIPPED | OUTPATIENT
Start: 2018-07-30 | End: 2018-10-15

## 2018-07-30 NOTE — TELEPHONE ENCOUNTER
Prescription approved per [facility].    Keren Webber, RN - BC

## 2018-08-10 ENCOUNTER — ANTICOAGULATION THERAPY VISIT (OUTPATIENT)
Dept: NURSING | Facility: CLINIC | Age: 83
End: 2018-08-10
Payer: COMMERCIAL

## 2018-08-10 DIAGNOSIS — I48.91 ATRIAL FIBRILLATION, UNSPECIFIED TYPE (H): ICD-10-CM

## 2018-08-10 LAB — INR POINT OF CARE: 1.6 (ref 0.86–1.14)

## 2018-08-10 PROCEDURE — 36416 COLLJ CAPILLARY BLOOD SPEC: CPT

## 2018-08-10 PROCEDURE — 85610 PROTHROMBIN TIME: CPT | Mod: QW

## 2018-08-13 ENCOUNTER — OFFICE VISIT (OUTPATIENT)
Dept: OPHTHALMOLOGY | Facility: CLINIC | Age: 83
End: 2018-08-13
Payer: COMMERCIAL

## 2018-08-13 DIAGNOSIS — H43.813 POSTERIOR VITREOUS DETACHMENT, BILATERAL: ICD-10-CM

## 2018-08-13 DIAGNOSIS — H52.4 PRESBYOPIA: ICD-10-CM

## 2018-08-13 DIAGNOSIS — Z01.01 ENCOUNTER FOR EXAMINATION OF EYES AND VISION WITH ABNORMAL FINDINGS: Primary | ICD-10-CM

## 2018-08-13 DIAGNOSIS — H35.30 MACULAR DEGENERATION: ICD-10-CM

## 2018-08-13 DIAGNOSIS — Z96.1 PSEUDOPHAKIA: ICD-10-CM

## 2018-08-13 PROCEDURE — 92015 DETERMINE REFRACTIVE STATE: CPT | Performed by: OPHTHALMOLOGY

## 2018-08-13 PROCEDURE — 92014 COMPRE OPH EXAM EST PT 1/>: CPT | Performed by: OPHTHALMOLOGY

## 2018-08-13 ASSESSMENT — CONF VISUAL FIELD
OD_NORMAL: 1
OS_NORMAL: 1
METHOD: COUNTING FINGERS

## 2018-08-13 ASSESSMENT — VISUAL ACUITY
OS_CC+: -2
OD_CC: 20/20
CORRECTION_TYPE: GLASSES
METHOD: SNELLEN - LINEAR
OS_CC: 20/25

## 2018-08-13 ASSESSMENT — TONOMETRY
OD_IOP_MMHG: 15
OS_IOP_MMHG: 15
IOP_METHOD: APPLANATION

## 2018-08-13 ASSESSMENT — REFRACTION_WEARINGRX
OS_AXIS: 018
OS_CYLINDER: +2.00
OD_CYLINDER: +2.00
OD_AXIS: 175
OS_SPHERE: -1.50
SPECS_TYPE: TRIFOCAL
OD_ADD: +3.00
OS_ADD: +3.00
OD_SPHERE: -1.25

## 2018-08-13 ASSESSMENT — REFRACTION_MANIFEST
OS_ADD: +2.75
OD_AXIS: 175
OS_AXIS: 005
OD_CYLINDER: +2.25
OS_CYLINDER: +1.75
OD_SPHERE: -0.75
OD_ADD: +2.75
OS_SPHERE: -1.50

## 2018-08-13 ASSESSMENT — EXTERNAL EXAM - RIGHT EYE: OD_EXAM: 1+ BROW PTOSIS

## 2018-08-13 ASSESSMENT — CUP TO DISC RATIO
OS_RATIO: 0.4
OD_RATIO: 0.4

## 2018-08-13 ASSESSMENT — EXTERNAL EXAM - LEFT EYE: OS_EXAM: 1+ BROW PTOSIS

## 2018-08-13 NOTE — PROGRESS NOTES
" Current Eye Medications:  Clear eyes as needed both eyes. Ocuvite daily.      Subjective:  Complete eye exam. Vision is OK both eyes in distance. Has trouble reading, feels like film is going over eyes. Headaches off and on, worse if trying to read. Not having any eye pain both eyes.     Objective:  See Ophthalmology Exam.       Assessment:  Stable eye exam.      ICD-10-CM    1. Encounter for examination of eyes and vision with abnormal findings Z01.01 REFRACTIVE STATUS   2. Presbyopia H52.4 REFRACTIVE STATUS   3. Pseudophakia, ou Z96.1 EYE EXAM (SIMPLE-NONBILLABLE)   4. Macular degeneration, dry, mild, ou H35.30    5. Posterior vitreous detachment, bilateral H43.813         Plan:  Glasses Rx given - optional.  Possible clouding of posterior capsule both eyes discussed.  Continue using artificial tears up to 4 times daily both eyes.  (Refresh Tears, Systane Ultra/Balance, or Theratears)  Take a multiple vitamin or \"eye vitamin\" daily (AREDS2).  Protect your eyes outdoors from ultraviolet rays with sunglasses and/or brimmed hat.  Have spinach (cooked or raw), colorful fruits, walnuts, hazelnuts, almonds in your diet.  Monitor the vision in each eye weekly - call if any sudden persistent changes.  Call in April 2019 for an appointment in August 2019 for Complete Exam.    Dr. Pace [phone]         "

## 2018-08-13 NOTE — MR AVS SNAPSHOT
"              After Visit Summary   8/13/2018    Kelly Mansfield    MRN: [id number]           Patient Information     Date Of Birth          6/8/1931        Visit Information        Provider Department      8/13/2018 9:00 AM Barrington Pace MD [facility] [place]        Today's Diagnoses     Encounter for examination of eyes and vision with abnormal findings    -  1    Presbyopia        Posterior vitreous detachment, bilateral        Macular degeneration, dry, mild, ou        Pseudophakia, ou          Care Instructions    Glasses Rx given - optional.  Possible clouding of posterior capsule both eyes discussed.  Continue using artificial tears up to 4 times daily both eyes.  (Refresh Tears, Systane Ultra/Balance, or Theratears)  Take a multiple vitamin or \"eye vitamin\" daily (AREDS2).  Protect your eyes outdoors from ultraviolet rays with sunglasses and/or brimmed hat.  Have spinach (cooked or raw), colorful fruits, walnuts, hazelnuts, almonds in your diet.  Monitor the vision in each eye weekly - call if any sudden persistent changes.  Call in April 2019 for an appointment in August 2019 for Complete Exam.    Dr. Pace [phone]            Follow-ups after your visit        Your next 10 appointments already scheduled     Aug 24, 2018 10:45 AM CDT   Anticoagulation Visit with [facility] ANTI COAG   [facility] [place] ([facility] [place])    [address] [place] [address]  [place] [address] [phone]              Who to contact     If you have questions or need follow up information about today's clinic visit or your schedule please contact [facility] [place] directly at [phone].  Normal or non-critical lab and imaging results will be communicated to you by MyChart, letter or phone within 4 business days after the clinic has received the results. If you do not hear from us within 7 days, please contact the clinic through MyChart or phone. If you have a critical or abnormal lab result, " we will notify you by phone as soon as possible.  Submit refill requests through [employer] or call your pharmacy and they will forward the refill request to us. Please allow 3 business days for your refill to be completed.          Additional Information About Your Visit        Care EveryWhere ID     This is your Care EveryWhere ID. This could be used by other organizations to access your [place] medical records  [id number]         Blood Pressure from Last 3 Encounters:   07/17/18 124/74   06/28/18 118/66   06/19/18 97/53    Weight from Last 3 Encounters:   07/17/18 66.2 kg (146 lb)   06/28/18 67.2 kg (148 lb 3.2 oz)   06/19/18 63 kg (139 lb)              Today, you had the following     No orders found for display         Today's Medication Changes          These changes are accurate as of 8/13/18  9:39 AM.  If you have any questions, ask your nurse or doctor.               These medicines have changed or have updated prescriptions.        Dose/Directions    metoprolol tartrate 25 MG tablet   Commonly known as:  LOPRESSOR   This may have changed:  when to take this   Used for:  Hypertension with target blood pressure goal under 150/90        Dose:  12.5 mg   Take 0.5 tablets (12.5 mg) by mouth 2 times daily   Quantity:  45 tablet   Refills:  3                Primary Care Provider Office Phone # Fax #    Tatianna Mota [phone] [address] [address]        Equal Access to Services     [facility]: Hadii juan Norman, waaxda luqadaha, qaybta kaalmada coco, lefty bassett. So [facility] [phone].    ATENCIÓN: Si habla español, tiene a maria disposición servicios gratuitos de asistencia lingüística. Kris al [phone].    We comply with applicable federal civil rights laws and [place] laws. We do not discriminate on the basis of race, color, national origin, age, disability, sex, sexual orientation, or gender identity.             Thank you!     Thank you for choosing [facility]  for your care. Our goal is always to provide you with excellent care. Hearing back from our patients is one way we can continue to improve our services. Please take a few minutes to complete the written survey that you may receive in the mail after your visit with us. Thank you!             Your Updated Medication List - Protect others around you: Learn how to safely use, store and throw away your medicines at www.disposemymeds.org.          This list is accurate as of 8/13/18  9:39 AM.  Always use your most recent med list.                   Brand Name Dispense Instructions for use Diagnosis    albuterol 108 (90 Base) MCG/ACT inhaler    PROAIR HFA/PROVENTIL HFA/VENTOLIN HFA    1 Inhaler    Inhale 2 puffs into the lungs every 4 hours as needed    Mild persistent asthma without complication       atorvastatin 20 MG tablet    LIPITOR    90 tablet    TAKE ONE TABLET BY MOUTH EVERY DAY    Hyperlipidemia LDL goal <130       CLEAR EYES COMPLETE OP      Apply 1 drop to eye as needed Both eyes.        digoxin 125 MCG tablet    LANOXIN    30 tablet    TAKE ONE TABLET BY MOUTH EVERY DAY    Atrial fibrillation, unspecified type (H)       DULERA 200-5 MCG/ACT oral inhaler   Generic drug:  mometasone-formoterol     13 g    INHALE TWO PUFFS BY MOUTH TWICE A DAY    Mild persistent asthma without complication       fluticasone 50 MCG/ACT spray    FLONASE    16 g    Spray 2 sprays into both nostrils 2 times daily    Nonallergic rhinitis       furosemide 20 MG tablet    LASIX    30 tablet    Take 1 tablet (20 mg) by mouth daily    Hypertension with target blood pressure goal under 150/90       metoprolol tartrate 25 MG tablet    LOPRESSOR    45 tablet    Take 0.5 tablets (12.5 mg) by mouth 2 times daily    Hypertension with target blood pressure goal under 150/90       OCUVITE PO      6mg daily        order for DME     1 each    Equipment being ordered: colten Hose  stockings    Venous stasis       polyethylene glycol powder    MIRALAX/GLYCOLAX     Take 1 capful by mouth daily        potassium chloride SA 10 MEQ CR tablet    K-DUR/KLOR-CON M    90 tablet    Take 1 tablet (10 mEq) by mouth daily    Hypokalemia       warfarin 2 MG tablet    COUMADIN    102 tablet    3 mg (2 mg x 1.5) on Mon, Wed, Fri; 2 mg (2 mg x 1) all other days    Long term current use of anticoagulant therapy

## 2018-08-13 NOTE — LETTER
"    8/13/2018         RE: Kelly Mansfield  [address]  [facility] [address]        Dear Colleague,    Thank you for referring your patient, Kelly Mansfield, to the [facility]. Please see a copy of my visit note below.     Current Eye Medications:  Clear eyes as needed both eyes. Ocuvite daily.      Subjective:  Complete eye exam. Vision is OK both eyes in distance. Has trouble reading, feels like film is going over eyes. Headaches off and on, worse if trying to read. Not having any eye pain both eyes.     Objective:  See Ophthalmology Exam.       Assessment:  Stable eye exam.      ICD-10-CM    1. Encounter for examination of eyes and vision with abnormal findings Z01.01 REFRACTIVE STATUS   2. Presbyopia H52.4 REFRACTIVE STATUS   3. Pseudophakia, ou Z96.1 EYE EXAM (SIMPLE-NONBILLABLE)   4. Macular degeneration, dry, mild, ou H35.30    5. Posterior vitreous detachment, bilateral H43.813         Plan:  Glasses Rx given - optional.  Possible clouding of posterior capsule both eyes discussed.  Continue using artificial tears up to 4 times daily both eyes.  (Refresh Tears, Systane Ultra/Balance, or Theratears)  Take a multiple vitamin or \"eye vitamin\" daily (AREDS2).  Protect your eyes outdoors from ultraviolet rays with sunglasses and/or brimmed hat.  Have spinach (cooked or raw), colorful fruits, walnuts, hazelnuts, almonds in your diet.  Monitor the vision in each eye weekly - call if any sudden persistent changes.  Call in April 2019 for an appointment in August 2019 for Complete Exam.    Dr. Pace [phone]           Again, thank you for allowing me to participate in the care of your patient.        Sincerely,        Barrington Pace MD    "

## 2018-08-13 NOTE — PATIENT INSTRUCTIONS
"Glasses Rx given - optional.  Possible clouding of posterior capsule both eyes discussed.  Continue using artificial tears up to 4 times daily both eyes.  (Refresh Tears, Systane Ultra/Balance, or Theratears)  Take a multiple vitamin or \"eye vitamin\" daily (AREDS2).  Protect your eyes outdoors from ultraviolet rays with sunglasses and/or brimmed hat.  Have spinach (cooked or raw), colorful fruits, walnuts, hazelnuts, almonds in your diet.  Monitor the vision in each eye weekly - call if any sudden persistent changes.  Call in April 2019 for an appointment in August 2019 for Complete Exam.    Dr. Pace [phone]    "

## 2018-08-24 ENCOUNTER — ANTICOAGULATION THERAPY VISIT (OUTPATIENT)
Dept: NURSING | Facility: CLINIC | Age: 83
End: 2018-08-24
Payer: COMMERCIAL

## 2018-08-24 DIAGNOSIS — I48.91 ATRIAL FIBRILLATION, UNSPECIFIED TYPE (H): ICD-10-CM

## 2018-08-24 LAB — INR POINT OF CARE: 2.5 (ref 0.86–1.14)

## 2018-08-24 PROCEDURE — 85610 PROTHROMBIN TIME: CPT | Mod: QW

## 2018-08-24 PROCEDURE — 99207 ZZC NO CHARGE NURSE ONLY: CPT

## 2018-08-24 PROCEDURE — 36416 COLLJ CAPILLARY BLOOD SPEC: CPT

## 2018-08-24 NOTE — PROGRESS NOTES
ANTICOAGULATION FOLLOW-UP CLINIC VISIT    Patient Name:  Kelly Mansfield  Date:  8/24/2018  Contact Type:  Face to Face    SUBJECTIVE:     Patient Findings     Positives No Problem Findings           OBJECTIVE    INR Protime   Date Value Ref Range Status   08/24/2018 2.5 (A) 0.86 - 1.14 Final       ASSESSMENT / PLAN  INR assessment THER    Recheck INR In: 2 WEEKS    INR Location Clinic      Anticoagulation Summary as of 8/24/2018     INR goal 2.0-3.0   Today's INR 2.5   Warfarin maintenance plan 2 mg (2 mg x 1) on Mon, Fri; 3 mg (2 mg x 1.5) all other days   Full warfarin instructions 2 mg on Mon, Fri; 3 mg all other days   Weekly warfarin total 19 mg   No change documented Erendira Richey RN   Plan last modified Keren Webber RN (8/10/2018)   Next INR check 9/7/2018   Target end date Indefinite    Indications   Atrial fibrillation (H) [I48.91] [I48.91]         Anticoagulation Episode Summary     INR check location     Preferred lab     Send INR reminders to [facility]    Comments       Anticoagulation Care Providers     Provider Role Specialty Phone number    Tatianna Mota MD [facility] [phone]            See the Encounter Report to view Anticoagulation Flowsheet and Dosing Calendar (Go to Encounters tab in chart review, and find the Anticoagulation Therapy Visit)    Dosage adjustment made based on physician directed care plan.    Erendira Richey RN

## 2018-08-24 NOTE — MR AVS SNAPSHOT
Kelly Mansfield   8/24/2018 10:45 AM   Anticoagulation Therapy Visit    Description:  87 year old female   Provider:  ZEENAT ANTI COAG   Department:  [facility]           INR as of 8/24/2018     Today's INR 2.5      Anticoagulation Summary as of 8/24/2018     INR goal 2.0-3.0   Today's INR 2.5   Full warfarin instructions 2 mg on Mon, Fri; 3 mg all other days   Next INR check 9/7/2018    Indications   Atrial fibrillation (H) [I48.91] [I48.91]         Your next Anticoagulation Clinic appointment(s)     Sep 07, 2018 10:45 AM CDT   Anticoagulation Visit with ZEENAT ANTI COAG   [facility] ([facility]    [address] [address] [phone]              Contact Numbers     [facility]  Please call [phone] to cancel and/or reschedule your appointment   Please call [phone] with any problems or questions regarding your therapy.        August 2018 Details    Sun Mon Tue Wed Thu Fri Sat        1               2               3               4                 5               6               7               8               9               10               11                 12               13               14               15               16               17               18                 19               20               21               22               23               24      2 mg   See details      25      3 mg           26      3 mg         27      2 mg         28      3 mg         29      3 mg         30      3 mg         31      2 mg           Date Details   08/24 This INR check               How to take your warfarin dose     To take:  2 mg Take 1 of the 2 mg tablets.    To take:  3 mg Take 1.5 of the 2 mg tablets.           September 2018 Details    Sun Mon Tue Wed Thu Fri Sat           1      3 mg           2      3 mg         3      2 mg         4      3 mg         5      3 mg         6      3 mg         7            8                 9                10               11               12               13               14               15                 16               17               18               19               20               21               22                 23               24               25               26               27               28               29                 30                      Date Details   No additional details    Date of next INR:  9/7/2018         How to take your warfarin dose     To take:  2 mg Take 1 of the 2 mg tablets.    To take:  3 mg Take 1.5 of the 2 mg tablets.

## 2018-09-07 ENCOUNTER — ANTICOAGULATION THERAPY VISIT (OUTPATIENT)
Dept: NURSING | Facility: CLINIC | Age: 83
End: 2018-09-07
Payer: COMMERCIAL

## 2018-09-07 DIAGNOSIS — I48.91 ATRIAL FIBRILLATION, UNSPECIFIED TYPE (H): ICD-10-CM

## 2018-09-07 DIAGNOSIS — Z23 NEED FOR PROPHYLACTIC VACCINATION AND INOCULATION AGAINST INFLUENZA: Primary | ICD-10-CM

## 2018-09-07 LAB — INR POINT OF CARE: 2.5 (ref 0.86–1.14)

## 2018-09-07 PROCEDURE — 99207 ZZC NO CHARGE NURSE ONLY: CPT

## 2018-09-07 PROCEDURE — G0008 ADMIN INFLUENZA VIRUS VAC: HCPCS

## 2018-09-07 PROCEDURE — 90662 IIV NO PRSV INCREASED AG IM: CPT

## 2018-09-07 PROCEDURE — 85610 PROTHROMBIN TIME: CPT | Mod: QW

## 2018-09-07 PROCEDURE — 36416 COLLJ CAPILLARY BLOOD SPEC: CPT

## 2018-09-07 NOTE — PROGRESS NOTES
Injectable Influenza Immunization Documentation    1.  Is the person to be vaccinated sick today?   No    2. Does the person to be vaccinated have an allergy to a component   of the vaccine?   No  Egg Allergy Algorithm Link    3. Has the person to be vaccinated ever had a serious reaction   to influenza vaccine in the past?   No    4. Has the person to be vaccinated ever had Guillain-Barré syndrome?   No    Form completed by Keren Webber RN - BC               ANTICOAGULATION FOLLOW-UP CLINIC VISIT    Patient Name:  Kelly Mansfield  Date:  9/7/2018  Contact Type:  Face to Face    SUBJECTIVE:     Patient Findings     Positives No Problem Findings    Comments Bleeding Signs/Symptoms: NO  Thromboembolic Signs/Symptoms: NO     Medication Changes:  NO  Dietary Changes:  NO  Bacterial/Viral Infection: NO     Missed Coumadin Doses: NO  Other Concerns:  NO             OBJECTIVE    INR Protime   Date Value Ref Range Status   09/07/2018 2.5 (A) 0.86 - 1.14 Final       ASSESSMENT / PLAN  No question data found.  Anticoagulation Summary as of 9/7/2018     INR goal 2.0-3.0   Today's INR 2.5   Warfarin maintenance plan 2 mg (2 mg x 1) on Mon, Fri; 3 mg (2 mg x 1.5) all other days   Full warfarin instructions 2 mg on Mon, Fri; 3 mg all other days   Weekly warfarin total 19 mg   No change documented Keren Webber RN   Plan last modified Keren Webber RN (8/10/2018)   Next INR check 9/28/2018   Target end date Indefinite    Indications   Atrial fibrillation (H) [I48.91] [I48.91]         Anticoagulation Episode Summary     INR check location     Preferred lab     Send INR reminders to [facility]    Comments       Anticoagulation Care Providers     Provider Role Specialty Phone number    Tatianna Mota MD [facility] [phone]            See the Encounter Report to view Anticoagulation Flowsheet and Dosing Calendar (Go to Encounters tab in chart review, and find the Anticoagulation Therapy  Visit)    Dosage adjustment made based on physician directed care plan.    Keren Webber RN

## 2018-09-07 NOTE — MR AVS SNAPSHOT
Kelly Mansfield   9/7/2018 10:45 AM   Anticoagulation Therapy Visit    Description:  87 year old female   Provider:  ZEENAT ANTI COAG   Department:  [facility]           INR as of 9/7/2018     Today's INR 2.5      Anticoagulation Summary as of 9/7/2018     INR goal 2.0-3.0   Today's INR 2.5   Full warfarin instructions 2 mg on Mon, Fri; 3 mg all other days   Next INR check 9/28/2018    Indications   Atrial fibrillation (H) [I48.91] [I48.91]         Your next Anticoagulation Clinic appointment(s)     Sep 28, 2018 10:45 AM CDT   Anticoagulation Visit with ZEENAT ANTI COAG   [facility]    [address] [address] [phone]              Contact Numbers     [facility]  Please call [phone] to cancel and/or reschedule your appointment   Please call [phone] with any problems or questions regarding your therapy.        September 2018 Details    Sun Mon Tue Wed Thu Fri Sat           1                 2               3               4               5               6               7      2 mg   See details      8      3 mg           9      3 mg         10      2 mg         11      3 mg         12      3 mg         13      3 mg         14      2 mg         15      3 mg           16      3 mg         17      2 mg         18      3 mg         19      3 mg         20      3 mg         21      2 mg         22      3 mg           23      3 mg         24      2 mg         25      3 mg         26      3 mg         27      3 mg         28            29                 30                      Date Details   09/07 This INR check       Date of next INR:  9/28/2018         How to take your warfarin dose     To take:  2 mg Take 1 of the 2 mg tablets.    To take:  3 mg Take 1.5 of the 2 mg tablets.

## 2018-09-11 ENCOUNTER — TELEPHONE (OUTPATIENT)
Dept: FAMILY MEDICINE | Facility: CLINIC | Age: 83
End: 2018-09-11

## 2018-09-11 NOTE — TELEPHONE ENCOUNTER
Reason for Call:  Form, our goal is to have forms completed with 72 hours, however, some forms may require a visit or additional information.    Type of letter, form or note:  medical    Who is the form from?: Patient    Where did the form come from: Patient or family brought in       What clinic location was the form placed at?: [facility]    Where the form was placed: 's Box    What number is listed as a contact on the form?: [phone]        Additional comments: none    Call taken on 9/11/2018 at 10:34 AM by Yamila Garcia

## 2018-09-12 NOTE — TELEPHONE ENCOUNTER
Left message for KATERIN APODACA that completed forms are down at [facility]  ready for . Alyssa Oh,     Designated pick-up person will need to provided a photo ID at time of .    Designated pick-up person KATERIN APODACA daughter    Daughter p/u forms

## 2018-09-28 ENCOUNTER — ANTICOAGULATION THERAPY VISIT (OUTPATIENT)
Dept: NURSING | Facility: CLINIC | Age: 83
End: 2018-09-28
Payer: COMMERCIAL

## 2018-09-28 DIAGNOSIS — I48.91 ATRIAL FIBRILLATION, UNSPECIFIED TYPE (H): ICD-10-CM

## 2018-09-28 LAB — INR POINT OF CARE: 1.7 (ref 0.86–1.14)

## 2018-09-28 PROCEDURE — 36416 COLLJ CAPILLARY BLOOD SPEC: CPT

## 2018-09-28 PROCEDURE — 85610 PROTHROMBIN TIME: CPT | Mod: QW

## 2018-09-28 PROCEDURE — 99207 ZZC NO CHARGE NURSE ONLY: CPT

## 2018-09-28 NOTE — PROGRESS NOTES
ANTICOAGULATION FOLLOW-UP CLINIC VISIT    Patient Name:  Kelly Mansfield  Date:  9/28/2018  Contact Type:  Face to Face    SUBJECTIVE:     Patient Findings     Positives No Problem Findings    Comments Bleeding Signs/Symptoms: NO  Thromboembolic Signs/Symptoms: NO     Medication Changes:  NO  Dietary Changes:  Patient has been eating more cabbage this past week  Bacterial/Viral Infection: NO     Missed Coumadin Doses: NO  Other Concerns:  NO             OBJECTIVE    INR Protime   Date Value Ref Range Status   09/28/2018 1.7 (A) 0.86 - 1.14 Final       ASSESSMENT / PLAN  INR assessment SUB    Recheck INR In: 2 WEEKS    INR Location Clinic      Anticoagulation Summary as of 9/28/2018     INR goal 2.0-3.0   Today's INR 1.7!   Warfarin maintenance plan 2 mg (2 mg x 1) on Mon, Fri; 3 mg (2 mg x 1.5) all other days   Full warfarin instructions 9/28: 3 mg; 10/1: 3 mg; Otherwise 2 mg on Mon, Fri; 3 mg all other days   Weekly warfarin total 19 mg   Plan last modified Keren Webber RN (8/10/2018)   Next INR check 10/12/2018   Target end date Indefinite    Indications   Atrial fibrillation (H) [I48.91] [I48.91]         Anticoagulation Episode Summary     INR check location     Preferred lab     Send INR reminders to [facility] CL[facility]IC    Comments       Anticoagulation Care Providers     Provider Role Specialty Phone number    Tatianna Mota MD [facility] [phone]            See the Encounter Report to view Anticoagulation Flowsheet and Dosing Calendar (Go to Encounters tab in chart review, and find the Anticoagulation Therapy Visit)    Dosage adjustment made based on physician directed care plan.    Keren Webber RN

## 2018-09-28 NOTE — MR AVS SNAPSHOT
Kelly Mansfield   9/28/2018 10:45 AM   Anticoagulation Therapy Visit    Description:  87 year old female   Provider:  ZEENAT ANTI COAG   Department:  [facility]           INR as of 9/28/2018     Today's INR 1.7!      Anticoagulation Summary as of 9/28/2018     INR goal 2.0-3.0   Today's INR 1.7!   Full warfarin instructions 9/28: 3 mg; 10/1: 3 mg; Otherwise 2 mg on Mon, Fri; 3 mg all other days   Next INR check 10/12/2018    Indications   Atrial fibrillation (H) [I48.91] [I48.91]         Your next Anticoagulation Clinic appointment(s)     Sep 28, 2018 10:45 AM CDT   Anticoagulation Visit with  ANTI COAG   [facility] ([address] [address] [phone]            Oct 12, 2018 10:30 AM CDT   Anticoagulation Visit with  ANTI COAG   [facility] ([address] [address] [phone]              Contact Numbers     [facility]  Please call [phone] to cancel and/or reschedule your appointment   Please call [phone] with any problems or questions regarding your therapy.        September 2018 Details    Sun Mon Tue Wed Thu Fri Sat           1                 2               3               4               5               6               7               8                 9               10               11               12               13               14               15                 16               17               18               19               20               21               22                 23               24               25               26               27               28      3 mg   See details      29      3 mg           30      3 mg                Date Details   09/28 This INR check               How to take your warfarin dose     To take:  3 mg Take 1.5 of the 2 mg tablets.           October 2018 Details    Sun Mon Tue Wed Thu Fri Sat      1      3 mg          2      3 mg         3      3 mg         4      3 mg         5      2 mg         6      3 mg           7      3 mg         8      2 mg         9      3 mg         10      3 mg         11      3 mg         12            13                 14               15               16               17               18               19               20                 21               22               23               24               25               26               27                 28               29               30               31                   Date Details   No additional details    Date of next INR:  10/12/2018         How to take your warfarin dose     To take:  2 mg Take 1 of the 2 mg tablets.    To take:  3 mg Take 1.5 of the 2 mg tablets.

## 2018-10-08 DIAGNOSIS — I48.91 ATRIAL FIBRILLATION, UNSPECIFIED TYPE (H): ICD-10-CM

## 2018-10-08 RX ORDER — DIGOXIN 125 MCG
TABLET ORAL
Qty: 30 TABLET | Refills: 5 | Status: SHIPPED | OUTPATIENT
Start: 2018-10-08 | End: 2019-04-01

## 2018-10-12 ENCOUNTER — ANTICOAGULATION THERAPY VISIT (OUTPATIENT)
Dept: NURSING | Facility: CLINIC | Age: 83
End: 2018-10-12
Payer: COMMERCIAL

## 2018-10-12 DIAGNOSIS — I48.91 ATRIAL FIBRILLATION, UNSPECIFIED TYPE (H): ICD-10-CM

## 2018-10-12 LAB — INR POINT OF CARE: 2.2 (ref 0.86–1.14)

## 2018-10-12 PROCEDURE — 85610 PROTHROMBIN TIME: CPT | Mod: QW

## 2018-10-12 PROCEDURE — 99207 ZZC NO CHARGE NURSE ONLY: CPT

## 2018-10-12 PROCEDURE — 36416 COLLJ CAPILLARY BLOOD SPEC: CPT

## 2018-10-12 NOTE — PROGRESS NOTES
ANTICOAGULATION FOLLOW-UP CLINIC VISIT    Patient Name:  Kelly Mansfield  Date:  10/12/2018  Contact Type:  Face to Face    SUBJECTIVE:     Patient Findings     Positives No Problem Findings    Comments Bleeding Signs/Symptoms: NO  Thromboembolic Signs/Symptoms: NO     Medication Changes:  NO  Dietary Changes:  NO  Bacterial/Viral Infection: NO     Missed Coumadin Doses: NO  Other Concerns:  NO             OBJECTIVE    INR Protime   Date Value Ref Range Status   10/12/2018 2.2 (A) 0.86 - 1.14 Final       ASSESSMENT / PLAN  INR assessment THER    Recheck INR In: 3 WEEKS    INR Location Clinic      Anticoagulation Summary as of 10/12/2018     INR goal 2.0-3.0   Today's INR 2.2   Warfarin maintenance plan 2 mg (2 mg x 1) on Mon, Fri; 3 mg (2 mg x 1.5) all other days   Full warfarin instructions 2 mg on Mon, Fri; 3 mg all other days   Weekly warfarin total 19 mg   No change documented Keren Webber RN   Plan last modified Keren Webber RN (8/10/2018)   Next INR check 11/2/2018   Target end date Indefinite    Indications   Atrial fibrillation (H) [I48.91] [I48.91]         Anticoagulation Episode Summary     INR check location     Preferred lab     Send INR reminders to [facility] CL[facility]    Comments       Anticoagulation Care Providers     Provider Role Specialty Phone number    Tatianna Mota MD [facility] [phone]            See the Encounter Report to view Anticoagulation Flowsheet and Dosing Calendar (Go to Encounters tab in chart review, and find the Anticoagulation Therapy Visit)    Dosage adjustment made based on physician directed care plan.    Keren Webber RN

## 2018-10-12 NOTE — MR AVS SNAPSHOT
Kelly Mansfield   10/12/2018 10:30 AM   Anticoagulation Therapy Visit    Description:  87 year old female   Provider:  ZEENAT ANTI COAG   Department:  [facility]           INR as of 10/12/2018     Today's INR 2.2      Anticoagulation Summary as of 10/12/2018     INR goal 2.0-3.0   Today's INR 2.2   Full warfarin instructions 2 mg on Mon, Fri; 3 mg all other days   Next INR check 11/2/2018    Indications   Atrial fibrillation (H) [I48.91] [I48.91]         Your next Anticoagulation Clinic appointment(s)     Oct 12, 2018 10:30 AM CDT   An[facility]agulation [facility] with  ANTI COAG   [facility]    [address] [address] [phone]            Nov 02, 2018 10:45 AM CDT   Anticoagulation Visit with  ANTI COAG   [facility]    [address] [address] [phone]              Contact Numbers     [facility]  Please call [phone] to cancel and/or reschedule your appointment   Please call [phone] with any problems or questions regarding your therapy.        October 2018 Details    Sun Mon Tue Wed Thu Fri Sat      1               2               3               4               5               6                 7               8               9               10               11               12      2 mg   See details      13      3 mg           14      3 mg         15      2 mg         16      3 mg         17      3 mg         18      3 mg         19      2 mg         20      3 mg           21      3 mg         22      2 mg         23      3 mg         24      3 mg         25      3 mg         26      2 mg         27      3 mg           28      3 mg         29      2 mg         30      3 mg         31      3 mg             Date Details   10/12 This INR check               How to take your warfarin dose     To take:  2 mg Take 1 of the 2 mg tablets.    To take:  3 mg Take 1.5 of the 2 mg  tablets.           November 2018 Details    Sun Mon Tue Wed Thu Fri Sat         1      3 mg         2            3                 4               5               6               7               8               9               10                 11               12               13               14               15               16               17                 18               19               20               21               22               23               24                 25               26               27               28               29               30                 Date Details   No additional details    Date of next INR:  11/2/2018         How to take your warfarin dose     To take:  2 mg Take 1 of the 2 mg tablets.    To take:  3 mg Take 1.5 of the 2 mg tablets.

## 2018-10-15 DIAGNOSIS — Z79.01 LONG TERM CURRENT USE OF ANTICOAGULANT THERAPY: ICD-10-CM

## 2018-10-15 RX ORDER — WARFARIN SODIUM 2 MG/1
TABLET ORAL
Qty: 102 TABLET | Refills: 0 | Status: SHIPPED | OUTPATIENT
Start: 2018-10-15 | End: 2018-12-31

## 2018-11-02 ENCOUNTER — ANTICOAGULATION THERAPY VISIT (OUTPATIENT)
Dept: NURSING | Facility: CLINIC | Age: 83
End: 2018-11-02
Payer: COMMERCIAL

## 2018-11-02 DIAGNOSIS — I48.91 ATRIAL FIBRILLATION, UNSPECIFIED TYPE (H): ICD-10-CM

## 2018-11-02 LAB — INR POINT OF CARE: 2.1 (ref 0.86–1.14)

## 2018-11-02 PROCEDURE — 85610 PROTHROMBIN TIME: CPT | Mod: QW

## 2018-11-02 PROCEDURE — 36416 COLLJ CAPILLARY BLOOD SPEC: CPT

## 2018-11-02 PROCEDURE — 99207 ZZC NO CHARGE NURSE ONLY: CPT

## 2018-11-02 NOTE — PROGRESS NOTES
ANTICOAGULATION FOLLOW-UP CLINIC VISIT    Patient Name:  Kelly Mansfield  Date:  11/2/2018  Contact Type:  Face to Face    SUBJECTIVE:     Patient Findings     Positives No Problem Findings           OBJECTIVE    INR Protime   Date Value Ref Range Status   11/02/2018 2.1 (A) 0.86 - 1.14 Final       ASSESSMENT / PLAN  INR assessment THER    Recheck INR In: 6 WEEKS    INR Location Clinic      Anticoagulation Summary as of 11/2/2018     INR goal 2.0-3.0   Today's INR 2.1   Warfarin maintenance plan 2 mg (2 mg x 1) on Mon, Fri; 3 mg (2 mg x 1.5) all other days   Full warfarin instructions 2 mg on Mon, Fri; 3 mg all other days   Weekly warfarin total 19 mg   No change documented Erendira Richey RN   Plan last modified Keren Webber RN (8/10/2018)   Next INR check 11/30/2018   Target end date Indefinite    Indications   Atrial fibrillation (H) [I48.91] [I48.91]         Anticoagulation Episode Summary     INR check location     Preferred lab     Send INR reminders to [facility]    Comments       Anticoagulation Care Providers     Provider Role Specialty Phone number    Tatianna Mota MD [facility] [phone]            See the Encounter Report to view Anticoagulation Flowsheet and Dosing Calendar (Go to Encounters tab in chart review, and find the Anticoagulation Therapy Visit)    Dosage adjustment made based on physician directed care plan.    Erendira Richey RN

## 2018-11-02 NOTE — MR AVS SNAPSHOT
Kelly Mansfield   11/2/2018 10:45 AM   Anticoagulation Therapy Visit    Description:  87 year old female   Provider:  EZENAT ANTI COAG   Department:  [facility]           INR as of 11/2/2018     Today's INR 2.1      Anticoagulation Summary as of 11/2/2018     INR goal 2.0-3.0   Today's INR 2.1   Full warfarin instructions 2 mg on Mon, Fri; 3 mg all other days   Next INR check 11/30/2018    Indications   Atrial fibrillation (H) [I48.91] [I48.91]         Your next Anticoagulation Clinic appointment(s)     Nov 30, 2018 10:45 AM CST   Anticoagulation [facility] with ZEENAT ANTI COAG   [facility]    [address] [address] [phone]              Contact Numbers     [facility]  Please call [phone] to cancel and/or reschedule your appointment   Please call [phone] with any problems or questions regarding your therapy.        November 2018 Details    Sun Mon Tue Wed Thu Fri Sat         1               2      2 mg   See details      3      3 mg           4      3 mg         5      2 mg         6      3 mg         7      3 mg         8      3 mg         9      2 mg         10      3 mg           11      3 mg         12      2 mg         13      3 mg         14      3 mg         15      3 mg         16      2 mg         17      3 mg           18      3 mg         19      2 mg         20      3 mg         21      3 mg         22      3 mg         23      2 mg         24      3 mg           25      3 mg         26      2 mg         27      3 mg         28      3 mg         29      3 mg         30              Date Details   11/02 This INR check       Date of next INR:  11/30/2018         How to take your warfarin dose     To take:  2 mg Take 1 of the 2 mg tablets.    To take:  3 mg Take 1.5 of the 2 mg tablets.

## 2018-11-12 DIAGNOSIS — I10 HYPERTENSION WITH TARGET BLOOD PRESSURE GOAL UNDER 150/90: ICD-10-CM

## 2018-11-12 RX ORDER — FUROSEMIDE 20 MG
TABLET ORAL
Qty: 30 TABLET | Refills: 5 | Status: SHIPPED | OUTPATIENT
Start: 2018-11-12 | End: 2019-01-09

## 2018-11-30 ENCOUNTER — ANTICOAGULATION THERAPY VISIT (OUTPATIENT)
Dept: NURSING | Facility: CLINIC | Age: 83
End: 2018-11-30
Payer: COMMERCIAL

## 2018-11-30 DIAGNOSIS — I48.91 ATRIAL FIBRILLATION, UNSPECIFIED TYPE (H): ICD-10-CM

## 2018-11-30 LAB — INR POINT OF CARE: 3 (ref 0.86–1.14)

## 2018-11-30 PROCEDURE — 36416 COLLJ CAPILLARY BLOOD SPEC: CPT

## 2018-11-30 PROCEDURE — 85610 PROTHROMBIN TIME: CPT | Mod: QW

## 2018-12-10 DIAGNOSIS — E87.6 HYPOKALEMIA: ICD-10-CM

## 2018-12-10 DIAGNOSIS — E78.5 HYPERLIPIDEMIA LDL GOAL <130: ICD-10-CM

## 2018-12-12 RX ORDER — ATORVASTATIN CALCIUM 20 MG/1
TABLET, FILM COATED ORAL
Qty: 90 TABLET | Refills: 0 | Status: SHIPPED | OUTPATIENT
Start: 2018-12-12 | End: 2019-03-11

## 2018-12-12 RX ORDER — POTASSIUM CHLORIDE 750 MG/1
CAPSULE, EXTENDED RELEASE ORAL
Qty: 90 CAPSULE | Refills: 0 | Status: SHIPPED | OUTPATIENT
Start: 2018-12-12 | End: 2019-01-09

## 2018-12-12 NOTE — TELEPHONE ENCOUNTER
Patient is out of medication, wondering if they can be approved today.     Thank you,  Laure Márquez, PharmD  [facility]  [phone]

## 2018-12-12 NOTE — TELEPHONE ENCOUNTER
"Prescription approved per [facility] Refill Protocol.  Requested Prescriptions   Pending Prescriptions Disp Refills     atorvastatin (LIPITOR) 20 MG tablet [Pharmacy Med Name: ATORVASTATIN CALCIUM 20MG TABS] 90 tablet 0     Sig: TAKE ONE TABLET BY MOUTH EVERY DAY    Statins Protocol Passed - 12/12/2018  9:09 AM       Passed - LDL on file in past 12 months    Recent Labs   Lab Test 12/14/17  1122   LDL 70            Passed - No abnormal creatine kinase in past 12 months    No lab results found.            Passed - Recent (12 mo) or future (30 days) visit within the authorizing provider's specialty    Patient had office visit in the last 12 months or has a visit in the next 30 days with authorizing provider or within the authorizing provider's specialty.  See \"Patient Info\" tab in inbasket, or \"Choose Columns\" in Meds & Orders section of the refill encounter.             Passed - Patient is age 18 or older       Passed - No active pregnancy on record       Passed - No positive pregnancy test in past 12 months        potassium chloride ER (MICRO-K) 10 MEQ CR capsule [Pharmacy Med Name: POTASSIUM CHLORIDE ER 10MEQ CPCR] 90 capsule 0     Sig: TAKE ONE CAPSULE BY MOUTH EVERY DAY    Potassium Supplements Protocol Passed - 12/12/2018  9:09 AM       Passed - Recent (12 mo) or future (30 days) visit within the authorizing provider's specialty    Patient had office visit in the last 12 months or has a visit in the next 30 days with authorizing provider or within the authorizing provider's specialty.  See \"Patient Info\" tab in inbasket, or \"Choose Columns\" in Meds & Orders section of the refill encounter.             Passed - Patient is age 18 or older       Passed - Normal serum potassium in past 12 months    Recent Labs   Lab Test 05/22/18  1234   POTASSIUM 4.1                    Keren Webber RN - BC      "

## 2018-12-28 ENCOUNTER — ANTICOAGULATION THERAPY VISIT (OUTPATIENT)
Dept: NURSING | Facility: CLINIC | Age: 83
End: 2018-12-28
Payer: COMMERCIAL

## 2018-12-28 DIAGNOSIS — I48.91 ATRIAL FIBRILLATION, UNSPECIFIED TYPE (H): ICD-10-CM

## 2018-12-28 LAB — INR POINT OF CARE: 2.2 (ref 0.86–1.14)

## 2018-12-28 PROCEDURE — 85610 PROTHROMBIN TIME: CPT | Mod: QW

## 2018-12-28 PROCEDURE — 99207 ZZC NO CHARGE NURSE ONLY: CPT

## 2018-12-28 PROCEDURE — 36416 COLLJ CAPILLARY BLOOD SPEC: CPT

## 2018-12-28 NOTE — PATIENT INSTRUCTIONS
[facility]:  Please call [phone] to cancel and/or reschedule your appointment   Please call [phone] with any problems or questions regarding your Warfarin therapy.

## 2018-12-28 NOTE — PROGRESS NOTES
ANTICOAGULATION FOLLOW-UP CLINIC VISIT    Patient Name:  Kelly Mansfield  Date:  2018  Contact Type:  Face to Face    SUBJECTIVE:     Patient Findings     Positives:   No Problem Findings           OBJECTIVE    INR Protime   Date Value Ref Range Status   2018 2.2 (A) 0.86 - 1.14 Final       ASSESSMENT / PLAN  INR assessment THER    Recheck INR In: 6 WEEKS    INR Location Clinic      Anticoagulation Summary  As of 2018    INR goal:   2.0-3.0   TTR:   69.1 % (7 mo)   INR used for dosin.2 (2018)   Warfarin maintenance plan:   2 mg (2 mg x 1) every Mon, Fri; 3 mg (2 mg x 1.5) all other days   Full warfarin instructions:   2 mg every Mon, Fri; 3 mg all other days   Weekly warfarin total:   19 mg   No change documented:   Erendira Richey RN   Plan last modified:   Keren Webber RN (8/10/2018)   Next INR check:   2019   Target end date:   Indefinite    Indications    Atrial fibrillation (H) [I48.91] [I48.91]             Anticoagulation Episode Summary     INR check location:       Preferred lab:       Send INR reminders to:   [facility]    Comments:         Anticoagulation Care Providers     Provider Role Specialty Phone number    Tatianna Mota MD [facility] [phone]            See the Encounter Report to view Anticoagulation Flowsheet and Dosing Calendar (Go to Encounters tab in chart review, and find the Anticoagulation Therapy Visit)    Dosage adjustment made based on physician directed care plan.    Erendira Richey RN

## 2018-12-31 DIAGNOSIS — Z79.01 LONG TERM CURRENT USE OF ANTICOAGULANT THERAPY: ICD-10-CM

## 2018-12-31 RX ORDER — WARFARIN SODIUM 2 MG/1
TABLET ORAL
Qty: 102 TABLET | Refills: 0 | Status: SHIPPED | OUTPATIENT
Start: 2018-12-31 | End: 2019-03-18

## 2018-12-31 NOTE — TELEPHONE ENCOUNTER
Medication is being filled for 1 time refill only due to:  Patient needs to be seen because needs annual exam.   Kenna aLw RN

## 2019-01-09 ENCOUNTER — OFFICE VISIT (OUTPATIENT)
Dept: FAMILY MEDICINE | Facility: CLINIC | Age: 84
End: 2019-01-09
Payer: COMMERCIAL

## 2019-01-09 VITALS
RESPIRATION RATE: 18 BRPM | HEIGHT: 58 IN | TEMPERATURE: 97.5 F | BODY MASS INDEX: 27.71 KG/M2 | SYSTOLIC BLOOD PRESSURE: 116 MMHG | OXYGEN SATURATION: 97 % | HEART RATE: 66 BPM | DIASTOLIC BLOOD PRESSURE: 60 MMHG | WEIGHT: 132 LBS

## 2019-01-09 DIAGNOSIS — E78.5 HYPERLIPIDEMIA LDL GOAL <100: ICD-10-CM

## 2019-01-09 DIAGNOSIS — R63.4 WEIGHT LOSS: ICD-10-CM

## 2019-01-09 DIAGNOSIS — I48.91 ATRIAL FIBRILLATION, UNSPECIFIED TYPE (H): ICD-10-CM

## 2019-01-09 DIAGNOSIS — Z98.890 H/O EXPLORATORY LAPAROTOMY: ICD-10-CM

## 2019-01-09 DIAGNOSIS — Z78.0 ASYMPTOMATIC POSTMENOPAUSAL STATUS: ICD-10-CM

## 2019-01-09 DIAGNOSIS — J45.30 MILD PERSISTENT ASTHMA WITHOUT COMPLICATION: ICD-10-CM

## 2019-01-09 DIAGNOSIS — I48.0 PAROXYSMAL ATRIAL FIBRILLATION (H): ICD-10-CM

## 2019-01-09 DIAGNOSIS — I67.9 CEREBROVASCULAR DISEASE: Primary | ICD-10-CM

## 2019-01-09 DIAGNOSIS — M19.012 PRIMARY OSTEOARTHRITIS OF LEFT SHOULDER: ICD-10-CM

## 2019-01-09 DIAGNOSIS — Z79.01 LONG TERM CURRENT USE OF ANTICOAGULANT THERAPY: ICD-10-CM

## 2019-01-09 DIAGNOSIS — I10 HYPERTENSION WITH TARGET BLOOD PRESSURE GOAL UNDER 150/90: ICD-10-CM

## 2019-01-09 DIAGNOSIS — Z00.01 ENCOUNTER FOR ROUTINE ADULT PHYSICAL EXAM WITH ABNORMAL FINDINGS: ICD-10-CM

## 2019-01-09 PROBLEM — E66.09 CLASS 1 OBESITY DUE TO EXCESS CALORIES WITH SERIOUS COMORBIDITY AND BODY MASS INDEX (BMI) OF 31.0 TO 31.9 IN ADULT: Status: RESOLVED | Noted: 2018-05-22 | Resolved: 2019-01-09

## 2019-01-09 PROBLEM — E66.811 CLASS 1 OBESITY DUE TO EXCESS CALORIES WITH SERIOUS COMORBIDITY AND BODY MASS INDEX (BMI) OF 31.0 TO 31.9 IN ADULT: Status: RESOLVED | Noted: 2018-05-22 | Resolved: 2019-01-09

## 2019-01-09 LAB
ALBUMIN SERPL-MCNC: 3.4 G/DL (ref 3.4–5)
ALP SERPL-CCNC: 118 U/L (ref 40–150)
ALT SERPL W P-5'-P-CCNC: 22 U/L (ref 0–50)
ANION GAP SERPL CALCULATED.3IONS-SCNC: 7 MMOL/L (ref 3–14)
AST SERPL W P-5'-P-CCNC: 21 U/L (ref 0–45)
BILIRUB SERPL-MCNC: 0.6 MG/DL (ref 0.2–1.3)
BUN SERPL-MCNC: 20 MG/DL (ref 7–30)
CALCIUM SERPL-MCNC: 9.7 MG/DL (ref 8.5–10.1)
CHLORIDE SERPL-SCNC: 104 MMOL/L (ref 94–109)
CO2 SERPL-SCNC: 29 MMOL/L (ref 20–32)
CREAT SERPL-MCNC: 0.64 MG/DL (ref 0.52–1.04)
DIFFERENTIAL METHOD BLD: NORMAL
EOSINOPHIL # BLD AUTO: 0.2 10E9/L (ref 0–0.7)
EOSINOPHIL NFR BLD AUTO: 2 %
ERYTHROCYTE [DISTWIDTH] IN BLOOD BY AUTOMATED COUNT: 14.6 % (ref 10–15)
ERYTHROCYTE [SEDIMENTATION RATE] IN BLOOD BY WESTERGREN METHOD: 18 MM/H (ref 0–30)
GFR SERPL CREATININE-BSD FRML MDRD: 80 ML/MIN/{1.73_M2}
GLUCOSE SERPL-MCNC: 96 MG/DL (ref 70–99)
HCT VFR BLD AUTO: 42.1 % (ref 35–47)
HGB BLD-MCNC: 13.8 G/DL (ref 11.7–15.7)
HYPOCHROMIA BLD QL: PRESENT
LYMPHOCYTES # BLD AUTO: 2.9 10E9/L (ref 0.8–5.3)
LYMPHOCYTES NFR BLD AUTO: 36 %
MCH RBC QN AUTO: 28.8 PG (ref 26.5–33)
MCHC RBC AUTO-ENTMCNC: 32.8 G/DL (ref 31.5–36.5)
MCV RBC AUTO: 88 FL (ref 78–100)
MONOCYTES # BLD AUTO: 0.3 10E9/L (ref 0–1.3)
MONOCYTES NFR BLD AUTO: 4 %
NEUTROPHILS # BLD AUTO: 4.7 10E9/L (ref 1.6–8.3)
NEUTROPHILS NFR BLD AUTO: 58 %
PLATELET # BLD AUTO: 189 10E9/L (ref 150–450)
PLATELET # BLD EST: NORMAL 10*3/UL
POTASSIUM SERPL-SCNC: 3.9 MMOL/L (ref 3.4–5.3)
PROT SERPL-MCNC: 7.1 G/DL (ref 6.8–8.8)
RBC # BLD AUTO: 4.79 10E12/L (ref 3.8–5.2)
SODIUM SERPL-SCNC: 140 MMOL/L (ref 133–144)
TSH SERPL DL<=0.005 MIU/L-ACNC: 2.13 MU/L (ref 0.4–4)
VARIANT LYMPHS BLD QL SMEAR: PRESENT
WBC # BLD AUTO: 8.1 10E9/L (ref 4–11)

## 2019-01-09 PROCEDURE — 36415 COLL VENOUS BLD VENIPUNCTURE: CPT | Performed by: FAMILY MEDICINE

## 2019-01-09 PROCEDURE — 84443 ASSAY THYROID STIM HORMONE: CPT | Performed by: FAMILY MEDICINE

## 2019-01-09 PROCEDURE — 85652 RBC SED RATE AUTOMATED: CPT | Performed by: FAMILY MEDICINE

## 2019-01-09 PROCEDURE — 99213 OFFICE O/P EST LOW 20 MIN: CPT | Mod: 25 | Performed by: FAMILY MEDICINE

## 2019-01-09 PROCEDURE — 80053 COMPREHEN METABOLIC PANEL: CPT | Performed by: FAMILY MEDICINE

## 2019-01-09 PROCEDURE — 85025 COMPLETE CBC W/AUTO DIFF WBC: CPT | Performed by: FAMILY MEDICINE

## 2019-01-09 PROCEDURE — G0439 PPPS, SUBSEQ VISIT: HCPCS | Performed by: FAMILY MEDICINE

## 2019-01-09 ASSESSMENT — MIFFLIN-ST. JEOR: SCORE: 923.5

## 2019-01-09 NOTE — PATIENT INSTRUCTIONS
Preventive Health Recommendations    See your health care provider every year to    Review health changes.     Discuss preventive care.      Review your medicines if your doctor has prescribed any.      You no longer need a yearly Pap test unless you've had an abnormal Pap test in the past 10 years. If you have vaginal symptoms, such as bleeding or discharge, be sure to talk with your provider about a Pap test.      Every 1 to 2 years, have a mammogram.  If you are over 69, talk with your health care provider about whether or not you want to continue having screening mammograms.      Every 10 years, have a colonoscopy. Or, have a yearly FIT test (stool test). These exams will check for colon cancer.       Have a cholesterol test every 5 years, or more often if your doctor advises it.       Have a diabetes test (fasting glucose) every three years. If you are at risk for diabetes, you should have this test more often.       At age 65, have a bone density scan (DEXA) to check for osteoporosis (brittle bone disease).    Shots:    Get a flu shot each year.    Get a tetanus shot every 10 years.    Talk to your doctor about your pneumonia vaccines. There are now two you should receive - Pneumovax (PPSV 23) and Prevnar (PCV 13).    Talk to your pharmacist about the shingles vaccine.    Talk to your doctor about the hepatitis B vaccine.    Nutrition:     Eat at least 5 servings of fruits and vegetables each day.      Eat whole-grain bread, whole-wheat pasta and brown rice instead of white grains and rice.      Get adequate Calcium and Vitamin D.     Lifestyle    Exercise at least 150 minutes a week (30 minutes a day, 5 days a week). This will help you control your weight and prevent disease.      Limit alcohol to one drink per day.      No smoking.       Wear sunscreen to prevent skin cancer.       See your dentist twice a year for an exam and cleaning.      See your eye doctor every 1 to 2 years to screen for conditions  such as glaucoma, macular degeneration and cataracts.    Personalized Prevention Plan  You are due for the preventive services outlined below.  Your care team is available to assist you in scheduling these services.  If you have already completed any of these items, please share that information with your care team to update in your medical record.  Health Maintenance Due   Topic Date Due     Zoster (Chicken Pox) Vaccine (2 of 3) 08/30/2012     Breathing Capacity Test - yearly  05/05/2016     Discuss Advance Directive Planning  08/24/2016     Osteoporosis Screening (Dexa) - every 2 years   05/18/2018     Asthma Control Test - every 6 months  11/22/2018     Cholesterol Lab - yearly  12/14/2018     Asthma Action Plan - yearly  12/14/2018     Preventive Health Recommendations    See your health care provider every year to    Review health changes.     Discuss preventive care.      Review your medicines if your doctor has prescribed any.      You no longer need a yearly Pap test unless you've had an abnormal Pap test in the past 10 years. If you have vaginal symptoms, such as bleeding or discharge, be sure to talk with your provider about a Pap test.      Every 1 to 2 years, have a mammogram.  If you are over 69, talk with your health care provider about whether or not you want to continue having screening mammograms.      Every 10 years, have a colonoscopy. Or, have a yearly FIT test (stool test). These exams will check for colon cancer.       Have a cholesterol test every 5 years, or more often if your doctor advises it.       Have a diabetes test (fasting glucose) every three years. If you are at risk for diabetes, you should have this test more often.       At age 65, have a bone density scan (DEXA) to check for osteoporosis (brittle bone disease).    Shots:    Get a flu shot each year.    Get a tetanus shot every 10 years.    Talk to your doctor about your pneumonia vaccines. There are now two you should receive -  Pneumovax (PPSV 23) and Prevnar (PCV 13).    Talk to your pharmacist about the shingles vaccine.    Talk to your doctor about the hepatitis B vaccine.    Nutrition:     Eat at least 5 servings of fruits and vegetables each day.      Eat whole-grain bread, whole-wheat pasta and brown rice instead of white grains and rice.      Get adequate Calcium and Vitamin D.     Lifestyle    Exercise at least 150 minutes a week (30 minutes a day, 5 days a week). This will help you control your weight and prevent disease.      Limit alcohol to one drink per day.      No smoking.       Wear sunscreen to prevent skin cancer.       See your dentist twice a year for an exam and cleaning.      See your eye doctor every 1 to 2 years to screen for conditions such as glaucoma, macular degeneration and cataracts.    Personalized Prevention Plan  You are due for the preventive services outlined below.  Your care team is available to assist you in scheduling these services.  If you have already completed any of these items, please share that information with your care team to update in your medical record.  Health Maintenance Due   Topic Date Due     Zoster (Chicken Pox) Vaccine (2 of 3) 08/30/2012     Breathing Capacity Test - yearly  05/05/2016     Discuss Advance Directive Planning  08/24/2016     Osteoporosis Screening (Dexa) - every 2 years   05/18/2018     Asthma Control Test - every 6 months  11/22/2018     Cholesterol Lab - yearly  12/14/2018     Asthma Action Plan - yearly  12/14/2018   [facility] [place]    If you have any questions regarding to your visit please contact your care team:       Team Red:   Clinic Hours Telephone Number   Dr. Opal Arango NP 7am-7pm  Monday - Thursday   7am-5pm  Fridays  [phone]  (Appointment scheduling available 24/7)   [facility] 11am-9pm Monday-Friday Saturday-Sunday- 9am-5pm   [facility]  5pm-9pm Monday-Friday Saturday-Sunday- 9am-5pm  [phone] - Opal Lopez  [phone] - [place]       What options do I have for a visit other than an office visit? We offer electronic visits (e-visits) and telephone visits, when medically appropriate.  Please check with your medical insurance to see if these types of visits are covered, as you will be responsible for any charges that are not paid by your insurance.      You can use e Health Access (secure electronic communication) to access to your chart, send your primary care provider a message, or make an appointment. Ask a team member how to get started.     For a price quote for your services, please call our Consumer Price Line at [phone] or our Imaging Cost estimation line at [phone] (for imaging tests).

## 2019-01-09 NOTE — LETTER
January 16, 2019    Kelly HEIDY Mansfield  [address]   [place] [address]      Dear Kelly,    All lab test are normal. Normal Blood. Normal Thyroid test. Please make lab appointment for Digoxin level as This was not done. Follow up as recommended.    Enclosed is a copy of your results.  Results for orders placed or performed in visit on 01/09/19   Comprehensive metabolic panel   Result Value Ref Range    Sodium 140 133 - 144 mmol/L    Potassium 3.9 3.4 - 5.3 mmol/L    Chloride 104 94 - 109 mmol/L    Carbon Dioxide 29 20 - 32 mmol/L    Anion Gap 7 3 - 14 mmol/L    Glucose 96 70 - 99 mg/dL    Urea Nitrogen 20 7 - 30 mg/dL    Creatinine 0.64 0.52 - 1.04 mg/dL    GFR Estimate 80 >60 mL/min/[1.73_m2]    GFR Estimate If Black >90 >60 mL/min/[1.73_m2]    Calcium 9.7 8.5 - 10.1 mg/dL    Bilirubin Total 0.6 0.2 - 1.3 mg/dL    Albumin 3.4 3.4 - 5.0 g/dL    Protein Total 7.1 6.8 - 8.8 g/dL    Alkaline Phosphatase 118 40 - 150 U/L    ALT 22 0 - 50 U/L    AST 21 0 - 45 U/L   Erythrocyte sedimentation rate auto   Result Value Ref Range    Sed Rate 18 0 - 30 mm/h   TSH with free T4 reflex   Result Value Ref Range    TSH 2.13 0.40 - 4.00 mU/L   CBC with platelets differential   Result Value Ref Range    WBC 8.1 4.0 - 11.0 10e9/L    RBC Count 4.79 3.8 - 5.2 10e12/L    Hemoglobin 13.8 11.7 - 15.7 g/dL    Hematocrit 42.1 35.0 - 47.0 %    MCV 88 78 - 100 fl    MCH 28.8 26.5 - 33.0 pg    MCHC 32.8 31.5 - 36.5 g/dL    RDW 14.6 10.0 - 15.0 %    Platelet Count 189 150 - 450 10e9/L    % Neutrophils 58.0 %    % Lymphocytes 36.0 %    % Monocytes 4.0 %    % Eosinophils 2.0 %    Absolute Neutrophil 4.7 1.6 - 8.3 10e9/L    Absolute Lymphocytes 2.9 0.8 - 5.3 10e9/L    Absolute Monocytes 0.3 0.0 - 1.3 10e9/L    Absolute Eosinophils 0.2 0.0 - 0.7 10e9/L    Reactive Lymphs Present     Hypochromasia Present     Platelet Estimate       Automated count confirmed.  Platelet morphology is normal.    Diff  Method Manual Differential    If you have any questions or concerns, please call myself or my nurse at [phone].      Sincerely,        Tatianna Mota MD/jordon

## 2019-01-09 NOTE — PROGRESS NOTES
"  SUBJECTIVE:   Kelly Mansfield is a 87 year old female who presents for Preventive Visit.    Are you in the first 12 months of your Medicare Part B coverage?  No    Physical Health:    In general, how would you rate your overall physical health? fair    Outside of work, how many days during the week do you exercise? none    Outside of work, approximately how many minutes a day do you exercise?not applicable    If you drink alcohol do you typically have >3 drinks per day or >7 drinks per week? No    Do you usually eat at least 4 servings of fruit and vegetables a day, include whole grains & fiber and avoid regularly eating high fat or \"junk\" foods? Yes    Do you have any problems taking medications regularly?  No    Do you have any side effects from medications? none    Needs assistance for the following daily activities: transportation, shopping, laundry and taking medicine    Which of the following safety concerns are present in your home?  none identified     Hearing impairment: No    In the past 6 months, have you been bothered by leaking of urine? yes    Mental Health:    In general, how would you rate your overall mental or emotional health? good  PHQ-2 Score:      Do you feel safe in your environment? Yes    Do you have a Health Care Directive? No: Advance care planning reviewed with patient; information given to patient to review.    Additional concerns to address?      Fall risk:  Fallen 2 or more times in the past year?: Yes  Any fall with injury in the past year?: No    Cognitive Screenin) Repeat 3 items (Leader, Season, Table)    2) Clock draw: ABNORMAL   3) 3 item recall: Recalls 3 objects  Results: ABNORMAL clock, 1-2 items recalled: PROBABLE COGNITIVE IMPAIRMENT, **INFORM PROVIDER**    Mini-CogTM Copyright BEKAH Herrera. Licensed by the author for use in [facility]; reprinted with permission ([email or web address]@.[email or web address]). All rights reserved.      Do you have sleep apnea, excessive snoring or " daytime drowsiness?: yes-snoring    Hyperlipidemia Follow-Up      Rate your low fat/cholesterol diet?: good    Taking statin?  yes    Other lipid medications/supplements?:  none    Hypertension Follow-up      Outpatient blood pressures are not being checked.    Low Salt Diet: no added salt    Asthma Follow-Up    Was ACT completed today?    Yes    ACT Total Scores 2019   ACT TOTAL SCORE -   ASTHMA ER VISITS -   ASTHMA HOSPITALIZATIONS -   ACT TOTAL SCORE (Goal Greater than or Equal to 20) 25   In the past 12 months, how many times did you visit the emergency room for your asthma without being admitted to the hospital? 0   In the past 12 months, how many times were you hospitalized overnight because of your asthma? 0       Recent asthma triggers that patient is dealing with: upper respiratory infections      Pt  Has  a History of stroke and is doing well  No new symptoms  Walks slowly with a walker  -------------------------------------    Reviewed and updated as needed this visit by clinical staff  Tobacco  Allergies  Meds  Med Hx  Surg Hx  Fam Hx  Soc Hx        Reviewed and updated as needed this visit by Provider        Social History     Tobacco Use     Smoking status: Former Smoker     Packs/day: 0.50     Years: 25.00     Pack years: 12.50     Last attempt to quit: 1991     Years since quittin.0     Smokeless tobacco: Never Used   Substance Use Topics     Alcohol use: No     Alcohol/week: 0.0 oz                           Current providers sharing in care for this patient include:   Patient Care Team:  Tatianna Mota MD as PCP - General (Family Practice)  Tatianna Mota MD as PCP - Assigned PCP    The following health maintenance items are reviewed in Epic and correct as of today:  Health Maintenance   Topic Date Due     ZOSTER IMMUNIZATION (2 of 3) 2012     SPIROMETRY Q1 YR  2016     ADVANCE DIRECTIVE PLANNING Q5 YRS  2016     DEXA Q2 YR  2018     ASTHMA CONTROL TEST Q6  MOS  11/22/2018     LIPID MONITORING Q1 YEAR  12/14/2018     ASTHMA ACTION PLAN Q1 YR  12/14/2018     BMP Q1 YR  05/22/2019     PHQ-2 Q1 YR  07/17/2019     FALL RISK ASSESSMENT  07/25/2019     EYE EXAM Q1 YEAR  08/16/2019     DTAP/TDAP/TD IMMUNIZATION (4 - Td) 08/17/2022     INFLUENZA VACCINE  Completed     IPV IMMUNIZATION  Aged Out     MENINGITIS IMMUNIZATION  Aged Out     Labs reviewed in EPIC  BP Readings from Last 3 Encounters:   01/09/19 116/60   07/17/18 124/74   06/28/18 118/66    Wt Readings from Last 3 Encounters:   01/09/19 59.9 kg (132 lb)   07/17/18 66.2 kg (146 lb)   06/28/18 67.2 kg (148 lb 3.2 oz)                  Patient Active Problem List   Diagnosis     Osteopenia     Family history of malignant neoplasm of breast     Stiffness of joint, not elsewhere classified, forearm     Advanced directives, counseling/discussion     Pseudophakia, ou     Lumbar spinal stenosis     Diagnostic skin and sensitization tests     Nonallergic rhinitis     Mild persistent asthma     Health Care Home     History of depression     Sebaceous cyst     History of total knee replacement     TIA (transient ischemic attack)     Incomplete tear of left rotator cuff     Primary osteoarthritis of left shoulder     Posterior vitreous detachment, bilateral     Macular degeneration, dry, mild, ou     Hypertension with target blood pressure goal under 150/90     History of TIA (transient ischemic attack) and stroke     Shoulder impingement, left     Grief     Hyperlipidemia LDL goal <100     Cerebrovascular disease     SBO (small bowel obstruction) (H)     H/O exploratory laparotomy     Paroxysmal atrial fibrillation (H)     Atrial fibrillation (H) [I48.91]     Loss of weight     Long term current use of anticoagulant therapy     Encounter for routine adult physical exam with abnormal findings     Past Surgical History:   Procedure Laterality Date     APPENDECTOMY  age 12     ARTHROSCOPY KNEE RT/LT  08/99    right     C NONSPECIFIC  PROCEDURE  57    tailbone cyst removed     C NONSPECIFIC PROCEDURE      Vein surgery     C TOTAL KNEE ARTHROPLASTY Right 6/3/15     Carpal tunnel surgery Bilateral      CATARACT IOL, RT/LT       COLONOSCOPY       ENT SURGERY      Skin lesions     ENT SURGERY  12    Removal lesion back of neck     HYSTERECTOMY, LEYDA  10/02     HYSTERECTOMY, LEYDA      ca uterus     PHACOEMULSIFICATION WITH STANDARD INTRAOCULAR LENS IMPLANT  2008; 2009    right eye; left eye     SALPINGO OOPHORECTOMY,R/L/VAHE  10/02    Salpingo Oophorectomy/BILATERAL       Social History     Tobacco Use     Smoking status: Former Smoker     Packs/day: 0.50     Years: 25.00     Pack years: 12.50     Last attempt to quit: 1991     Years since quittin.0     Smokeless tobacco: Never Used   Substance Use Topics     Alcohol use: No     Alcohol/week: 0.0 oz     Family History   Problem Relation Age of Onset     Heart Disease Mother      Migraines Mother         unknown age     Unknown/Adopted Father      Unknown/Adopted Maternal Grandmother      Unknown/Adopted Maternal Grandfather      Unknown/Adopted Paternal Grandmother      Unknown/Adopted Paternal Grandfather      Diabetes Daughter      Breast Cancer Other         dads side     Depression Son         committed suicide 3/2010     Macular Degeneration Paternal Aunt      Macular Degeneration Paternal Aunt      Glaucoma No family hx of          Current Outpatient Medications   Medication Sig Dispense Refill     albuterol (PROAIR HFA/PROVENTIL HFA/VENTOLIN HFA) 108 (90 BASE) MCG/ACT Inhaler Inhale 2 puffs into the lungs every 4 hours as needed 1 Inhaler 6     atorvastatin (LIPITOR) 20 MG tablet TAKE ONE TABLET BY MOUTH EVERY DAY 90 tablet 0     digoxin (LANOXIN) 125 MCG tablet TAKE ONE TABLET BY MOUTH EVERY DAY 30 tablet 5     DULERA 200-5 MCG/ACT oral inhaler INHALE TWO PUFFS BY MOUTH TWICE A DAY 13 g 11     fluticasone (FLONASE) 50 MCG/ACT spray Spray 2 sprays into both nostrils 2  times daily 16 g 6     Hyprom-Naphaz-Polysorb-Zn Sulf (CLEAR EYES COMPLETE OP) Apply 1 drop to eye as needed Both eyes.       metoprolol tartrate (LOPRESSOR) 25 MG tablet Take 0.5 tablets (12.5 mg) by mouth 2 times daily (Patient taking differently: Take 12.5 mg by mouth daily ) 45 tablet 3     OCUVITE OR 6mg daily       order for DME Equipment being ordered: colten Hose stockings 1 each 0     polyethylene glycol (MIRALAX/GLYCOLAX) powder Take 1 capful by mouth daily       warfarin (JANTOVEN) 2 MG tablet TAKE 1 TABLET BY MOUTH ON MONDAY AND FRIDAY AND 1 AND 1/2 TABLETS ALL OTHER DAYS OR AS DIRECTED BY INR CLINIC 102 tablet 0     No Known Allergies  Recent Labs   Lab Test 05/22/18  1234 12/14/17  1122 05/11/17  1018 10/24/16  0950  10/06/15  0931  11/28/14  1111 08/04/14  1151  11/27/13  1032   LDL  --  70  --  86  --  84  --  86  --   --  86   HDL  --  84  --  72  --  55  --  59  --   --  45*   TRIG  --  67  --  75  --  116  --  211*  --   --  174*   ALT  --   --  24  --   --   --   --  22  --   --  29   CR 0.62 0.70 0.77 0.78   < >  --    < > 0.80  --    < > 0.71   GFRESTIMATED >90 80 72 70   < >  --    < > 68  --    < > 79   GFRESTBLACK >90 >90 87 84   < >  --    < > 83  --    < > >90   POTASSIUM 4.1 4.0 4.0 4.5   < >  --    < > 3.6  --    < > 4.0   TSH  --   --  1.38  --   --   --   --   --  3.01  --   --     < > = values in this interval not displayed.      Mammogram Screening: Patient over age 75, has elected to stop mammography screening.    ROS:  CONSTITUTIONAL:has Lost weight-forgets to eat-though still cooks herself  INTEGUMENTARY/SKIN: NEGATIVE for worrisome rashes, moles or lesions  EYES: NEGATIVE for vision changes or irritation  ENT/MOUTH: NEGATIVE for ear, mouth and throat problems  RESP: NEGATIVE for significant cough or SOB  BREAST: NEGATIVE for masses, tenderness or discharge  CV: NEGATIVE for chest pain, palpitations or peripheral edema  GI: NEGATIVE for nausea, abdominal pain, heartburn, or change in  "bowel habits  : NEGATIVE for frequency, dysuria, or hematuria  MUSCULOSKELETAL:arthritis/uses walker  NEURO: mild cognitive dysfunction  ENDOCRINE: NEGATIVE for temperature intolerance, skin/hair changes  HEME: NEGATIVE for bleeding problems  PSYCHIATRIC: NEGATIVE for changes in mood or affect    OBJECTIVE:   /60   Pulse 66   Temp 97.5  F (36.4  C) (Oral)   Resp 18   Ht 1.473 m (4' 10\")   Wt 59.9 kg (132 lb)   SpO2 97%   BMI 27.59 kg/m   Estimated body mass index is 27.59 kg/m  as calculated from the following:    Height as of this encounter: 1.473 m (4' 10\").    Weight as of this encounter: 59.9 kg (132 lb).  EXAM:   GENERAL APPEARANCE: alert and no distress  EYES: Eyes grossly normal to inspection, PERRL and conjunctivae and sclerae normal  HENT: ear canals and TM's normal, nose and mouth without ulcers or lesions, oropharynx clear and oral mucous membranes moist  NECK: no adenopathy, no asymmetry, masses, or scars and thyroid normal to palpation  RESP: lungs clear to auscultation - no rales, rhonchi or wheezes  BREAST: normal without masses, tenderness or nipple discharge and no palpable axillary masses or adenopathy  CV: regular rate and rhythm, normal S1 S2, no S3 or S4, no murmur, click or rub, no peripheral edema and peripheral pulses strong  ABDOMEN: soft, nontender, no hepatosplenomegaly, no masses and bowel sounds normal  MS: walks slowly with a walker  SKIN: no suspicious lesions or rashes  NEURO: Normal strength and tone, sensory exam grossly normal, mentation intact and speech normal  PSYCH: mentation appears normal    Diagnostic Test Results:  Pending     ASSESSMENT / PLAN:   1. Encounter for routine adult physical exam with abnormal findings      2. Weight loss  Discussed her diet  Consider seeing Nutritionist  Add ensure  - Comprehensive metabolic panel  - Erythrocyte sedimentation rate auto  - TSH with free T4 reflex  - CBC with platelets differential    3. Atrial fibrillation, " "unspecified type (H)  In SR    4. Hypertension with target blood pressure goal under 150/90  Advised stop lasix for now  Call if any swelling in her feet or gains weight more than 2 pounds a day or over 5 pounds a week    5. Long term current use of anticoagulant therapy  On coumadin    6. Paroxysmal atrial fibrillation (H)  As above  - Digoxin level    7. Mild persistent asthma without complication  controlled    8. H/O exploratory laparotomy      9. Primary osteoarthritis of left shoulder  Take Tylenol    10. Asymptomatic postmenopausal status    - DEXA HIP/PELVIS/SPINE - Future; Future    11. Cerebrovascular disease  Stable     12. Hyperlipidemia LDL goal <100  Labs pending       End of Life Planning:  Patient currently has an advanced directive: No.  I have verified the patient's ablity to prepare an advanced directive/make health care decisions.  Literature was provided to assist patient in preparing an advanced directive.    COUNSELING:  Reviewed preventive health counseling, as reflected in patient instructions       Regular exercise       Healthy diet/nutrition       Vision screening       Osteoporosis Prevention/Bone Health       Advanced Planning     BP Readings from Last 1 Encounters:   01/09/19 116/60     Estimated body mass index is 27.59 kg/m  as calculated from the following:    Height as of this encounter: 1.473 m (4' 10\").    Weight as of this encounter: 59.9 kg (132 lb).           reports that she quit smoking about 28 years ago. She has a 12.50 pack-year smoking history. she has never used smokeless tobacco.      Appropriate preventive services were discussed with this patient, including applicable screening as appropriate for cardiovascular disease, diabetes, osteopenia/osteoporosis, and glaucoma.  As appropriate for age/gender, discussed screening for colorectal cancer, prostate cancer, breast cancer, and cervical cancer. Checklist reviewing preventive services available has been given to the " patient.    Reviewed patients plan of care and provided an AVS. The Intermediate Care Plan ( asthma action plan, low back pain action plan, and migraine action plan) for Kelly meets the Care Plan requirement. This Care Plan has been established and reviewed with the Patient.  Follow up 1 month weight check    Counseling Resources:  ATP IV Guidelines  Pooled Cohorts Equation Calculator  Breast Cancer Risk Calculator  FRAX Risk Assessment  ICSI Preventive Guidelines  Dietary Guidelines for Americans, 2010  USDA's MyPlate  ASA Prophylaxis  Lung CA Screening    Tatianna Mota MD  [facility]

## 2019-01-10 ENCOUNTER — TELEPHONE (OUTPATIENT)
Dept: FAMILY MEDICINE | Facility: CLINIC | Age: 84
End: 2019-01-10

## 2019-01-10 DIAGNOSIS — I48.91 ATRIAL FIBRILLATION (H): Primary | ICD-10-CM

## 2019-01-10 ASSESSMENT — ASTHMA QUESTIONNAIRES: ACT_TOTALSCORE: 25

## 2019-01-10 NOTE — TELEPHONE ENCOUNTER
Spoke with lab as digoxin order was discontinued- patient should have labs at least 6 hours after her Digoxin dose.  Patient notified of providers message as written. She thinks she takes digoxin in evening before bed.  She does not drive and asked that her daughter be notified as she will be driving patient to lab appointment.  Message left for daughter to call back.      Daughter called back and was notified of message, lab appointment scheduled. Lab order placed.     Notes recorded by Tatianna Mota MD on 1/10/2019 at 7:26 AM CST  All lab test are normal   .cmpck  Normal Blood count  Normal Thyroid test  Please make lab appointment for Digoxin level as This was not done  Follow up as recommended   Tatianna Espinal RN

## 2019-01-11 DIAGNOSIS — I48.91 ATRIAL FIBRILLATION (H): ICD-10-CM

## 2019-01-11 LAB — DIGOXIN SERPL-MCNC: 1.1 UG/L (ref 0.5–2)

## 2019-01-11 PROCEDURE — 80162 ASSAY OF DIGOXIN TOTAL: CPT | Performed by: FAMILY MEDICINE

## 2019-01-11 PROCEDURE — 36415 COLL VENOUS BLD VENIPUNCTURE: CPT | Performed by: FAMILY MEDICINE

## 2019-01-11 NOTE — LETTER
[facility]  [address] [place] [address]    January 14, 2019    Kelly Mansfield  [address]   [place] [address]          Dear Kelly,    Your results are normal    Enclosed is a copy of your results.     Results for orders placed or performed in visit on 01/11/19   Digoxin level   Result Value Ref Range    Digoxin Level 1.1 0.5 - 2.0 ug/L       If you have any questions or concerns, please call myself or my nurse at [phone].      Sincerely,        Opal Botello MD /[place]

## 2019-01-16 ENCOUNTER — TELEPHONE (OUTPATIENT)
Dept: FAMILY MEDICINE | Facility: CLINIC | Age: 84
End: 2019-01-16

## 2019-01-16 NOTE — TELEPHONE ENCOUNTER
Reason for Call:  Other call back    Detailed comments: Michelle stating there was only 1 result given. Wants to know where the rest are. Please advise.     Phone Number Patient can be reached at: Other phone number: [phone]     Best Time: any     Can we leave a detailed message on this number? YES    Call taken on 1/16/2019 at 11:58 AM by Joe Higuera

## 2019-01-16 NOTE — TELEPHONE ENCOUNTER
Called and spoke with patient's daughter Michelle.   Gave results per 1/9/2019 result note.     Requesting that all labs from 1/9/2019 be mailed out.    Maria L Jenkins RN

## 2019-01-31 ENCOUNTER — TRANSFERRED RECORDS (OUTPATIENT)
Dept: HEALTH INFORMATION MANAGEMENT | Facility: CLINIC | Age: 84
End: 2019-01-31

## 2019-01-31 ENCOUNTER — TELEPHONE (OUTPATIENT)
Dept: FAMILY MEDICINE | Facility: CLINIC | Age: 84
End: 2019-01-31

## 2019-01-31 NOTE — TELEPHONE ENCOUNTER
Reason for Call:  Other prescription    Detailed comments:  Daughter calling. Today the patient took today, tomorrow and saturdays medications.     Please call to advise.     Phone Number Patient can be reached at: Home number on file [phone] (home)    Best Time:  Any     Can we leave a detailed message on this number? YES    Call taken on 1/31/2019 at 2:27 PM by Courtney Rahman

## 2019-01-31 NOTE — TELEPHONE ENCOUNTER
Spoke with daughter. States pt took Thursday's meds at 5:21am, Friday's meds at 5:33am, and Saturday's meds at 8am. She took: digoxin, lipitor, metoprolol, coumadin and ocuvite each time she took the meds again. She was only supposed to take 3mg coumadin today and took 8mg total. States she feels fine. No dizziness. No fatigue. Does not know why she did it. Huddled with Dr. Mota. Because she took 3 doses of the digoxin, she could be bradycardic. Also needs INR. She advised pt go to ER. Daughter notified and will bring pt to ER.    Erendira Richey RN  [facility] [place]

## 2019-02-04 ENCOUNTER — ANTICOAGULATION THERAPY VISIT (OUTPATIENT)
Dept: NURSING | Facility: CLINIC | Age: 84
End: 2019-02-04
Payer: COMMERCIAL

## 2019-02-04 DIAGNOSIS — I48.91 ATRIAL FIBRILLATION, UNSPECIFIED TYPE (H): ICD-10-CM

## 2019-02-04 LAB — INR POINT OF CARE: 1.7 (ref 0.86–1.14)

## 2019-02-04 PROCEDURE — 85610 PROTHROMBIN TIME: CPT | Mod: QW

## 2019-02-04 PROCEDURE — 99207 ZZC NO CHARGE NURSE ONLY: CPT

## 2019-02-04 PROCEDURE — 36416 COLLJ CAPILLARY BLOOD SPEC: CPT

## 2019-02-04 NOTE — PROGRESS NOTES
ANTICOAGULATION FOLLOW-UP CLINIC VISIT    Patient Name:  Kelly Mansfield  Date:  2019  Contact Type:  Face to Face    SUBJECTIVE:     Patient Findings     Positives:   No Problem Findings, Unexplained INR or factor level change    Comments:   Bleeding Signs/Symptoms: NO  Thromboembolic Signs/Symptoms: NO     Medication Changes:  NO  Dietary Changes:  NO  Bacterial/Viral Infection: NO     Missed Coumadin Doses: Patient took all her Friday and Saturday medication on Thursday with her Thursday medication. Warfarin was held for Friday and Saturday due to taking doses on Thursday  Other Concerns:  NO             OBJECTIVE    INR Protime   Date Value Ref Range Status   2019 1.7 (A) 0.86 - 1.14 Final       ASSESSMENT / PLAN  No question data found.  Anticoagulation Summary  As of 2019    INR goal:   2.0-3.0   TTR:   64.7 % (8.2 mo)   INR used for dosin.7! (2019)   Warfarin maintenance plan:   2 mg (2 mg x 1) every Mon, Fri; 3 mg (2 mg x 1.5) all other days   Full warfarin instructions:   2 mg every Mon, Fri; 3 mg all other days   Weekly warfarin total:   19 mg   Plan last modified:   Keren Webber RN (8/10/2018)   Next INR check:   2019   Target end date:   Indefinite    Indications    Atrial fibrillation (H) [I48.91] [I48.91]             Anticoagulation Episode Summary     INR check location:       Preferred lab:       Send INR reminders to:   [facility]    Comments:         Anticoagulation Care Providers     Provider Role Specialty Phone number    Tatianna Mota MD [facility] [phone]            See the Encounter Report to view Anticoagulation Flowsheet and Dosing Calendar (Go to Encounters tab in chart review, and find the Anticoagulation Therapy Visit)        Keren Webber RN

## 2019-02-04 NOTE — PATIENT INSTRUCTIONS
[facility]:  Please call [phone] to cancel and/or reschedule your appointment   Please call [phone] with any problems or questions regarding your Warfarin therapy.    Some signs and symptoms of clots include: pain or tenderness in arm or leg, swelling in arm or leg, changes in skin color, or area is warm to touch, shortness or breath, trouble breathing.  Numbness or weakness especially on 1 side of the body, sudden trouble speaking or swallowing, sudden trouble seeing, sudden confusion, dizzy spells or headache.  If you have these please call 911 or seek medical care immediately.

## 2019-02-11 ENCOUNTER — ANTICOAGULATION THERAPY VISIT (OUTPATIENT)
Dept: NURSING | Facility: CLINIC | Age: 84
End: 2019-02-11
Payer: COMMERCIAL

## 2019-02-11 ENCOUNTER — OFFICE VISIT (OUTPATIENT)
Dept: FAMILY MEDICINE | Facility: CLINIC | Age: 84
End: 2019-02-11
Payer: COMMERCIAL

## 2019-02-11 VITALS
WEIGHT: 129 LBS | BODY MASS INDEX: 27.08 KG/M2 | SYSTOLIC BLOOD PRESSURE: 130 MMHG | OXYGEN SATURATION: 99 % | TEMPERATURE: 98.6 F | DIASTOLIC BLOOD PRESSURE: 68 MMHG | HEART RATE: 63 BPM | RESPIRATION RATE: 16 BRPM | HEIGHT: 58 IN

## 2019-02-11 DIAGNOSIS — I10 HYPERTENSION WITH TARGET BLOOD PRESSURE GOAL UNDER 150/90: ICD-10-CM

## 2019-02-11 DIAGNOSIS — I48.91 ATRIAL FIBRILLATION, UNSPECIFIED TYPE (H): ICD-10-CM

## 2019-02-11 DIAGNOSIS — T50.901D ACCIDENTAL OVERDOSE, SUBSEQUENT ENCOUNTER: Primary | ICD-10-CM

## 2019-02-11 DIAGNOSIS — J45.30 MILD PERSISTENT ASTHMA WITHOUT COMPLICATION: ICD-10-CM

## 2019-02-11 DIAGNOSIS — Z79.01 LONG TERM CURRENT USE OF ANTICOAGULANT THERAPY: ICD-10-CM

## 2019-02-11 DIAGNOSIS — I48.0 PAROXYSMAL ATRIAL FIBRILLATION (H): ICD-10-CM

## 2019-02-11 PROBLEM — T50.901A ACCIDENTAL OVERDOSE: Status: ACTIVE | Noted: 2019-02-11

## 2019-02-11 LAB — INR POINT OF CARE: 1.8 (ref 0.86–1.14)

## 2019-02-11 PROCEDURE — 99207 ZZC NO CHARGE NURSE ONLY: CPT

## 2019-02-11 PROCEDURE — 99214 OFFICE O/P EST MOD 30 MIN: CPT | Performed by: FAMILY MEDICINE

## 2019-02-11 PROCEDURE — 36416 COLLJ CAPILLARY BLOOD SPEC: CPT

## 2019-02-11 PROCEDURE — 85610 PROTHROMBIN TIME: CPT | Mod: QW

## 2019-02-11 RX ORDER — METOPROLOL TARTRATE 25 MG/1
12.5 TABLET, FILM COATED ORAL DAILY
Qty: 90 TABLET | Refills: 3 | Status: SHIPPED | OUTPATIENT
Start: 2019-02-11 | End: 2019-07-16

## 2019-02-11 ASSESSMENT — MIFFLIN-ST. JEOR: SCORE: 909.89

## 2019-02-11 NOTE — PATIENT INSTRUCTIONS
[facility]:  Please call [phone] to cancel and/or reschedule your appointment   Please call [phone] with any problems or questions regarding your Warfarin therapy.      Some signs and symptoms of clots include: pain or tenderness in arm or leg, swelling in arm or leg, changes in skin color, or area is warm to touch, shortness or breath, trouble breathing.  Numbness or weakness especially on 1 side of the body, sudden trouble speaking or swallowing, sudden trouble seeing, sudden confusion, dizzy spells or headache.  If you have these please call 911 or seek medical care immediately.

## 2019-02-11 NOTE — PROGRESS NOTES
"  SUBJECTIVE:   Kelly Mansfield is a 87 year old female who presents to clinic today for the following health issues:  {Provider please address medication reconciliation discrepancies--rooming staff please delete if no med/rec issues}    ED/UC Followup:    Facility:  [facility]  Date of visit: 1/31/19  Reason for visit: Accidental overdose  Current Status: ***         {additional problems for provider to add:655705}    Problem list and histories reviewed & adjusted, as indicated.  Additional history: {NONE - AS DOCUMENTED:653293::\"as documented\"}    {HIST REVIEW/ LINKS 2:400406}    Reviewed and updated as needed this visit by clinical staff       Reviewed and updated as needed this visit by Provider         {PROVIDER CHARTING PREFERENCE:100224}    "

## 2019-02-11 NOTE — PROGRESS NOTES
ANTICOAGULATION FOLLOW-UP CLINIC VISIT    Patient Name:  Kelly Mansfield  Date:  2019  Contact Type:  Face to Face    SUBJECTIVE:     Patient Findings     Positives:   No Problem Findings, Unexplained INR or factor level change    Comments:   Bleeding Signs/Symptoms: NO  Thromboembolic Signs/Symptoms: NO     Medication Changes:  NO  Dietary Changes:  NO  Bacterial/Viral Infection: NO     Missed Coumadin Doses: NO  Other Concerns:  NO             OBJECTIVE    INR Protime   Date Value Ref Range Status   2019 1.8 (A) 0.86 - 1.14 Final       ASSESSMENT / PLAN  No question data found.  Anticoagulation Summary  As of 2019    INR goal:   2.0-3.0   TTR:   62.9 % (8.5 mo)   INR used for dosin.8! (2019)   Warfarin maintenance plan:   2 mg (2 mg x 1) every Mon, Fri; 3 mg (2 mg x 1.5) all other days   Full warfarin instructions:   : 3 mg; Otherwise 2 mg every Mon, Fri; 3 mg all other days   Weekly warfarin total:   19 mg   Plan last modified:   Keren Webber RN (8/10/2018)   Next INR check:   2019   Target end date:   Indefinite    Indications    Atrial fibrillation (H) [I48.91] [I48.91]             Anticoagulation Episode Summary     INR check location:       Preferred lab:       Send INR reminders to:   [facility]    Comments:         Anticoagulation Care Providers     Provider Role Specialty Phone number    Tatianna Mota MD [facility] [phone]            See the Encounter Report to view Anticoagulation Flowsheet and Dosing Calendar (Go to Encounters tab in chart review, and find the Anticoagulation Therapy Visit)        Keren Webber RN

## 2019-02-11 NOTE — PATIENT INSTRUCTIONS
[facility]    If you have any questions regarding to your visit please contact your care team:       Team Red:   Clinic Hours Telephone Number   Dr. Opal Arango NP 7am-7pm  Monday - Thursday   7am-5pm  Fridays  [phone]  (Appointment scheduling available 24/7)   Urgent Care - [place] and [facility] - 11am-9pm Monday-Friday Saturday-Sunday- 9am-5pm   [place] - 5pm-9pm Monday-Friday Saturday-Sunday- 9am-5pm  [phone] - [place]  [phone] - [place]       What options do I have for a visit other than an office visit? We offer electronic visits (e-visits) and telephone visits, when medically appropriate.  Please check with your medical insurance to see if these types of visits are covered, as you will be responsible for any charges that are not paid by your insurance.      You can use [employer] (secure electronic communication) to access to your chart, send your primary care provider a message, or make an appointment. Ask a team member how to get started.     For a price quote for your services, please call our Consumer Price Line at [phone] or our Imaging Cost estimation line at [phone] (for imaging tests).    RAYA Gambino CMA ([facility])

## 2019-02-11 NOTE — PROGRESS NOTES
SUBJECTIVE:   Kelly Mansfield is a 87 year old female who presents to clinic today for the following health issues:    ED/UC Followup:    Facility:  1-31-19  Date of visit: at [facility]  Reason for visit: Accidental Overdose of coumadin and Digoxin          Current Status: stable        Pt is doing better  1.Patient here to follow up today on blood pressure and weight.    Problem list and histories reviewed & adjusted, as indicated.  Additional history: as documented    Patient Active Problem List   Diagnosis     Osteopenia     Family history of malignant neoplasm of breast     Stiffness of joint, not elsewhere classified, forearm     Advanced directives, counseling/discussion     Pseudophakia, ou     Lumbar spinal stenosis     Diagnostic skin and sensitization tests     Nonallergic rhinitis     Mild persistent asthma     Health Care Home     History of depression     Sebaceous cyst     History of total knee replacement     TIA (transient ischemic attack)     Incomplete tear of left rotator cuff     Primary osteoarthritis of left shoulder     Posterior vitreous detachment, bilateral     Macular degeneration, dry, mild, ou     Hypertension with target blood pressure goal under 150/90     History of TIA (transient ischemic attack) and stroke     Shoulder impingement, left     Grief     Hyperlipidemia LDL goal <100     Cerebrovascular disease     SBO (small bowel obstruction) (H)     H/O exploratory laparotomy     Paroxysmal atrial fibrillation (H)     Atrial fibrillation (H) [I48.91]     Loss of weight     Long term current use of anticoagulant therapy     Encounter for routine adult physical exam with abnormal findings     Accidental overdose     Past Surgical History:   Procedure Laterality Date     APPENDECTOMY  age 12     ARTHROSCOPY KNEE RT/LT  08/99    right     C NONSPECIFIC PROCEDURE  57    tailbone cyst removed     C NONSPECIFIC PROCEDURE      Vein surgery     C TOTAL KNEE ARTHROPLASTY Right 6/3/15      Carpal tunnel surgery Bilateral      CATARACT IOL, RT/LT       COLONOSCOPY       ENT SURGERY      Skin lesions     ENT SURGERY  12    Removal lesion back of neck     HYSTERECTOMY, LEYDA  10/02     HYSTERECTOMY, LEYDA      ca uterus     PHACOEMULSIFICATION WITH STANDARD INTRAOCULAR LENS IMPLANT  2008; 2009    right eye; left eye     SALPINGO OOPHORECTOMY,R/L/VAHE  10/02    Salpingo Oophorectomy/BILATERAL       Social History     Tobacco Use     Smoking status: Former Smoker     Packs/day: 0.50     Years: 25.00     Pack years: 12.50     Last attempt to quit: 1991     Years since quittin.1     Smokeless tobacco: Never Used   Substance Use Topics     Alcohol use: No     Alcohol/week: 0.0 oz     Family History   Problem Relation Age of Onset     Heart Disease Mother      Migraines Mother         unknown age     Unknown/Adopted Father      Unknown/Adopted Maternal Grandmother      Unknown/Adopted Maternal Grandfather      Unknown/Adopted Paternal Grandmother      Unknown/Adopted Paternal Grandfather      Diabetes Daughter      Breast Cancer Other         dads side     Depression Son         committed suicide 3/2010     Macular Degeneration Paternal Aunt      Macular Degeneration Paternal Aunt      Glaucoma No family hx of          Current Outpatient Medications   Medication Sig Dispense Refill     albuterol (PROAIR HFA/PROVENTIL HFA/VENTOLIN HFA) 108 (90 BASE) MCG/ACT Inhaler Inhale 2 puffs into the lungs every 4 hours as needed 1 Inhaler 6     atorvastatin (LIPITOR) 20 MG tablet TAKE ONE TABLET BY MOUTH EVERY DAY 90 tablet 0     digoxin (LANOXIN) 125 MCG tablet TAKE ONE TABLET BY MOUTH EVERY DAY 30 tablet 5     DULERA 200-5 MCG/ACT oral inhaler INHALE TWO PUFFS BY MOUTH TWICE A DAY 13 g 11     Hyprom-Naphaz-Polysorb-Zn Sulf (CLEAR EYES COMPLETE OP) Apply 1 drop to eye as needed Both eyes.       metoprolol tartrate (LOPRESSOR) 25 MG tablet Take 0.5 tablets (12.5 mg) by mouth daily 90 tablet 3      OCUVITE OR 6mg daily       order for DME Equipment being ordered: colten Hose stockings 1 each 0     polyethylene glycol (MIRALAX/GLYCOLAX) powder Take 1 capful by mouth daily       warfarin (JANTOVEN) 2 MG tablet TAKE 1 TABLET BY MOUTH ON MONDAY AND FRIDAY AND 1 AND 1/2 TABLETS ALL OTHER DAYS OR AS DIRECTED BY INR CLINIC 102 tablet 0     fluticasone (FLONASE) 50 MCG/ACT spray Spray 2 sprays into both nostrils 2 times daily (Patient not taking: Reported on 2/11/2019) 16 g 6     No Known Allergies  Recent Labs   Lab Test 01/09/19  1236 05/22/18  1234 12/14/17  1122 05/11/17  1018 10/24/16  0950  10/06/15  0931  11/28/14  1111   LDL  --   --  70  --  86  --  84  --  86   HDL  --   --  84  --  72  --  55  --  59   TRIG  --   --  67  --  75  --  116  --  211*   ALT 22  --   --  24  --   --   --   --  22   CR 0.64 0.62 0.70 0.77 0.78   < >  --    < > 0.80   GFRESTIMATED 80 >90 80 72 70   < >  --    < > 68   GFRESTBLACK >90 >90 >90 87 84   < >  --    < > 83   POTASSIUM 3.9 4.1 4.0 4.0 4.5   < >  --    < > 3.6   TSH 2.13  --   --  1.38  --   --   --   --   --     < > = values in this interval not displayed.      BP Readings from Last 3 Encounters:   02/11/19 130/68   01/09/19 116/60   07/17/18 124/74    Wt Readings from Last 3 Encounters:   02/11/19 58.5 kg (129 lb)   01/09/19 59.9 kg (132 lb)   07/17/18 66.2 kg (146 lb)                  Labs reviewed in EPIC    Reviewed and updated as needed this visit by clinical staff  Tobacco  Allergies  Meds  Med Hx  Surg Hx  Fam Hx  Soc Hx      Reviewed and updated as needed this visit by Provider         ROS:  CONSTITUTIONAL: NEGATIVE for fever, chills, change in weight  ENT/MOUTH: NEGATIVE for ear, mouth and throat problems  RESP: NEGATIVE for significant cough or SOB  CV: NEGATIVE for chest pain, palpitations or peripheral edema  GI: NEGATIVE for nausea, abdominal pain, heartburn, or change in bowel habits  MUSCULOSKELETAL: NEGATIVE for significant arthralgias or myalgia  ROS  "otherwise negative    OBJECTIVE:     /68   Pulse 63   Temp 98.6  F (37  C) (Tympanic)   Resp 16   Ht 1.473 m (4' 10\")   Wt 58.5 kg (129 lb)   SpO2 99%   Breastfeeding? No   BMI 26.96 kg/m    Body mass index is 26.96 kg/m .  GENERAL: healthy, alert and no distress  NECK: no adenopathy, no asymmetry, masses, or scars and thyroid normal to palpation  RESP: lungs clear to auscultation - no rales, rhonchi or wheezes  CV: regular rate and rhythm, normal S1 S2, no S3 or S4, no murmur, click or rub, no peripheral edema and peripheral pulses strong  ABDOMEN: soft, nontender, no hepatosplenomegaly, no masses and bowel sounds normal  MS: no gross musculoskeletal defects noted, no edema  PSYCH: mentation appears normal    Diagnostic Test Results:  Pt is seeing INR nurse    ASSESSMENT/PLAN:       1. Accidental overdose, subsequent encounter  Doing well    2. Paroxysmal atrial fibrillation (H)  Stable     3. Mild persistent asthma without complication  controlled    4. Hypertension with target blood pressure goal under 150/90  Stable   - metoprolol tartrate (LOPRESSOR) 25 MG tablet; Take 0.5 tablets (12.5 mg) by mouth daily  Dispense: 90 tablet; Refill: 3    5. Long term current use of anticoagulant therapy  Sees INR nurse and has appointment today  Follow up 3  months weight check  Tatianna Mota MD  [facility]  "

## 2019-02-25 ENCOUNTER — ANTICOAGULATION THERAPY VISIT (OUTPATIENT)
Dept: NURSING | Facility: CLINIC | Age: 84
End: 2019-02-25
Payer: COMMERCIAL

## 2019-02-25 DIAGNOSIS — I48.91 ATRIAL FIBRILLATION, UNSPECIFIED TYPE (H): ICD-10-CM

## 2019-02-25 LAB — INR POINT OF CARE: 1.9 (ref 0.86–1.14)

## 2019-02-25 PROCEDURE — 99207 ZZC NO CHARGE NURSE ONLY: CPT

## 2019-02-25 PROCEDURE — 85610 PROTHROMBIN TIME: CPT | Mod: QW

## 2019-02-25 PROCEDURE — 36416 COLLJ CAPILLARY BLOOD SPEC: CPT

## 2019-02-25 NOTE — PATIENT INSTRUCTIONS
[facility]:  Please call [phone] to cancel and/or reschedule your appointment   Please call [phone] with any problems or questions regarding your Warfarin therapy.

## 2019-02-25 NOTE — PROGRESS NOTES
ANTICOAGULATION FOLLOW-UP CLINIC VISIT    Patient Name:  Kelly Mansfield  Date:  2019  Contact Type:  Face to Face    SUBJECTIVE:     Patient Findings     Positives:   No Problem Findings    Comments:   Bleeding Signs/Symptoms: NO  Thromboembolic Signs/Symptoms: NO     Medication Changes:  NO  Dietary Changes:  NO  Bacterial/Viral Infection: NO     Missed Coumadin Doses: NO  Other Concerns:  NO             OBJECTIVE    INR Protime   Date Value Ref Range Status   2019 1.9 (A) 0.86 - 1.14 Final       ASSESSMENT / PLAN  No question data found.  Anticoagulation Summary  As of 2019    INR goal:   2.0-3.0   TTR:   59.6 % (8.9 mo)   INR used for dosin.9! (2019)   Warfarin maintenance plan:   2 mg (2 mg x 1) every Mon, Fri; 3 mg (2 mg x 1.5) all other days   Full warfarin instructions:   3/: 3 mg; 38: 3 mg; Otherwise 2 mg every Mon, Fri; 3 mg all other days   Weekly warfarin total:   19 mg   Plan last modified:   Keren Webber RN (8/10/2018)   Next INR check:   3/11/2019   Target end date:   Indefinite    Indications    Atrial fibrillation (H) [I48.91] [I48.91]             Anticoagulation Episode Summary     INR check location:       Preferred lab:       Send INR reminders to:   [facility]    Comments:         Anticoagulation Care Providers     Provider Role Specialty Phone number    Tatianna Mota MD [facility] [phone]            See the Encounter Report to view Anticoagulation Flowsheet and Dosing Calendar (Go to Encounters tab in chart review, and find the Anticoagulation Therapy Visit)        Keren Webber RN

## 2019-03-11 ENCOUNTER — ANTICOAGULATION THERAPY VISIT (OUTPATIENT)
Dept: NURSING | Facility: CLINIC | Age: 84
End: 2019-03-11
Payer: COMMERCIAL

## 2019-03-11 DIAGNOSIS — I48.91 ATRIAL FIBRILLATION, UNSPECIFIED TYPE (H): ICD-10-CM

## 2019-03-11 DIAGNOSIS — E78.5 HYPERLIPIDEMIA LDL GOAL <130: ICD-10-CM

## 2019-03-11 LAB — INR POINT OF CARE: 2.3 (ref 0.86–1.14)

## 2019-03-11 PROCEDURE — 99207 ZZC NO CHARGE NURSE ONLY: CPT

## 2019-03-11 PROCEDURE — 36416 COLLJ CAPILLARY BLOOD SPEC: CPT

## 2019-03-11 PROCEDURE — 85610 PROTHROMBIN TIME: CPT | Mod: QW

## 2019-03-11 RX ORDER — ATORVASTATIN CALCIUM 20 MG/1
TABLET, FILM COATED ORAL
Qty: 90 TABLET | Refills: 0 | Status: SHIPPED | OUTPATIENT
Start: 2019-03-11 | End: 2019-07-10

## 2019-03-11 NOTE — PROGRESS NOTES
ANTICOAGULATION FOLLOW-UP CLINIC VISIT    Patient Name:  Kelly Mansfield  Date:  3/11/2019  Contact Type:  Face to Face    SUBJECTIVE:     Patient Findings     Positives:   No Problem Findings    Comments:   Bleeding Signs/Symptoms: NO  Thromboembolic Signs/Symptoms: NO     Medication Changes:  NO  Dietary Changes:  NO  Bacterial/Viral Infection: NO     Missed Coumadin Doses: NO  Other Concerns:  NO             OBJECTIVE    INR Protime   Date Value Ref Range Status   2019 2.3 (A) 0.86 - 1.14 Final       ASSESSMENT / PLAN  No question data found.  Anticoagulation Summary  As of 3/11/2019    INR goal:   2.0-3.0   TTR:   60.4 % (9.4 mo)   INR used for dosin.3 (3/11/2019)   Warfarin maintenance plan:   2 mg (2 mg x 1) every Mon; 3 mg (2 mg x 1.5) all other days   Full warfarin instructions:   2 mg every Mon; 3 mg all other days   Weekly warfarin total:   20 mg   Plan last modified:   Keren Webber RN (3/11/2019)   Next INR check:   2019   Target end date:   Indefinite    Indications    Atrial fibrillation (H) [I48.91] [I48.91]             Anticoagulation Episode Summary     INR check location:       Preferred lab:       Send INR reminders to:   [facility]    Comments:         Anticoagulation Care Providers     Provider Role Specialty Phone number    Tatianna Mota MD [facility] [phone]            See the Encounter Report to view Anticoagulation Flowsheet and Dosing Calendar (Go to Encounters tab in chart review, and find the Anticoagulation Therapy Visit)        Keren Webber RN

## 2019-03-11 NOTE — TELEPHONE ENCOUNTER
"Routing refill request to provider for review/approval because:  Labs not current:  LDL    Requested Prescriptions   Pending Prescriptions Disp Refills     atorvastatin (LIPITOR) 20 MG tablet [Pharmacy Med Name: ATORVASTATIN CALCIUM 20MG TABS] 90 tablet 0     Sig: TAKE ONE TABLET BY MOUTH ONCE DAILY    Statins Protocol Failed - 3/11/2019 11:19 AM       Failed - LDL on file in past 12 months    Recent Labs   Lab Test 12/14/17  1122   LDL 70            Passed - No abnormal creatine kinase in past 12 months    No lab results found.            Passed - Recent (12 mo) or future (30 days) visit within the authorizing provider's specialty    Patient had office visit in the last 12 months or has a visit in the next 30 days with authorizing provider or within the authorizing provider's specialty.  See \"Patient Info\" tab in inbasket, or \"Choose Columns\" in Meds & Orders section of the refill encounter.             Passed - Medication is active on med list       Passed - Patient is age 18 or older       Passed - No active pregnancy on record       Passed - No positive pregnancy test in past 12 months        Maria L Jenkins RN  "

## 2019-03-11 NOTE — PATIENT INSTRUCTIONS
[facility]:  Please call [phone] to cancel and/or reschedule your appointment   Please call [phone] with any problems or questions regarding your Warfarin therapy.

## 2019-03-18 ENCOUNTER — TELEPHONE (OUTPATIENT)
Dept: FAMILY MEDICINE | Facility: CLINIC | Age: 84
End: 2019-03-18

## 2019-03-18 DIAGNOSIS — Z79.01 LONG TERM CURRENT USE OF ANTICOAGULANT THERAPY: ICD-10-CM

## 2019-03-18 RX ORDER — WARFARIN SODIUM 2 MG/1
TABLET ORAL
Qty: 102 TABLET | Refills: 0 | Status: SHIPPED | OUTPATIENT
Start: 2019-03-18 | End: 2019-05-28

## 2019-03-25 ENCOUNTER — DOCUMENTATION ONLY (OUTPATIENT)
Dept: OPHTHALMOLOGY | Facility: CLINIC | Age: 84
End: 2019-03-25

## 2019-04-01 ENCOUNTER — ANTICOAGULATION THERAPY VISIT (OUTPATIENT)
Dept: NURSING | Facility: CLINIC | Age: 84
End: 2019-04-01
Payer: COMMERCIAL

## 2019-04-01 DIAGNOSIS — I48.91 ATRIAL FIBRILLATION, UNSPECIFIED TYPE (H): ICD-10-CM

## 2019-04-01 LAB — INR POINT OF CARE: 2.3 (ref 0.86–1.14)

## 2019-04-01 PROCEDURE — 36416 COLLJ CAPILLARY BLOOD SPEC: CPT

## 2019-04-01 PROCEDURE — 85610 PROTHROMBIN TIME: CPT | Mod: QW

## 2019-04-01 PROCEDURE — 99207 ZZC NO CHARGE NURSE ONLY: CPT

## 2019-04-01 NOTE — PROGRESS NOTES
ANTICOAGULATION FOLLOW-UP CLINIC VISIT    Patient Name:  Kelly Mansfield  Date:  2019  Contact Type:  Face to Face    SUBJECTIVE:     Patient Findings     Comments:   Bleeding Signs/Symptoms: NO  Thromboembolic Signs/Symptoms: NO     Medication Changes:  NO  Dietary Changes:  NO  Bacterial/Viral Infection: NO     Missed Coumadin Doses: NO  Other Concerns:  NO             OBJECTIVE    INR Protime   Date Value Ref Range Status   2019 2.3 (A) 0.86 - 1.14 Final       ASSESSMENT / PLAN  No question data found.  Anticoagulation Summary  As of 2019    INR goal:   2.0-3.0   TTR:   63.1 % (10.1 mo)   INR used for dosin.3 (2019)   Warfarin maintenance plan:   2 mg (2 mg x 1) every Mon; 3 mg (2 mg x 1.5) all other days   Full warfarin instructions:   2 mg every Mon; 3 mg all other days   Weekly warfarin total:   20 mg   No change documented:   Keren Webber RN   Plan last modified:   Keren Webber RN (3/11/2019)   Next INR check:   2019   Target end date:   Indefinite    Indications    Atrial fibrillation (H) [I48.91] [I48.91]             Anticoagulation Episode Summary     INR check location:       Preferred lab:       Send INR reminders to:   [facility]    Comments:         Anticoagulation Care Providers     Provider Role Specialty Phone number    Tatianna Mota MD [facility] [phone]            See the Encounter Report to view Anticoagulation Flowsheet and Dosing Calendar (Go to Encounters tab in chart review, and find the Anticoagulation Therapy Visit)        Keren Webber RN

## 2019-04-02 RX ORDER — DIGOXIN 125 MCG
TABLET ORAL
Qty: 30 TABLET | Refills: 5 | Status: SHIPPED | OUTPATIENT
Start: 2019-04-02 | End: 2019-07-16

## 2019-04-29 ENCOUNTER — ANTICOAGULATION THERAPY VISIT (OUTPATIENT)
Dept: NURSING | Facility: CLINIC | Age: 84
End: 2019-04-29
Payer: COMMERCIAL

## 2019-04-29 DIAGNOSIS — I48.91 ATRIAL FIBRILLATION, UNSPECIFIED TYPE (H): ICD-10-CM

## 2019-04-29 LAB — INR POINT OF CARE: 3.1 (ref 0.86–1.14)

## 2019-04-29 PROCEDURE — 36416 COLLJ CAPILLARY BLOOD SPEC: CPT

## 2019-04-29 PROCEDURE — 99207 ZZC NO CHARGE NURSE ONLY: CPT

## 2019-04-29 PROCEDURE — 85610 PROTHROMBIN TIME: CPT | Mod: QW

## 2019-04-29 NOTE — PATIENT INSTRUCTIONS
[facility]:  Please call [phone] to cancel and/or reschedule your appointment   Please call [phone] with any problems or questions regarding your Warfarin therapy.

## 2019-04-29 NOTE — PROGRESS NOTES
ANTICOAGULATION FOLLOW-UP CLINIC VISIT    Patient Name:  Kelly Mansfield  Date:  4/29/2019  Contact Type:  Face to Face    SUBJECTIVE:     Patient Findings     Comments:   Bleeding Signs/Symptoms: NO  Thromboembolic Signs/Symptoms: NO     Medication Changes:  NO  Dietary Changes:  NO  Bacterial/Viral Infection: NO     Missed Coumadin Doses: NO  Other Concerns:  NO             OBJECTIVE    INR Protime   Date Value Ref Range Status   04/29/2019 3.1 (A) 0.86 - 1.14 Final       ASSESSMENT / PLAN  No question data found.  Anticoagulation Summary  As of 4/29/2019    INR goal:   2.0-3.0   TTR:   65.2 % (11 mo)   INR used for dosing:   3.1! (4/29/2019)   Warfarin maintenance plan:   2 mg (2 mg x 1) every Mon; 3 mg (2 mg x 1.5) all other days   Full warfarin instructions:   2 mg every Mon; 3 mg all other days   Weekly warfarin total:   20 mg   No change documented:   Keren Webber RN   Plan last modified:   Keren Webber RN (3/11/2019)   Next INR check:   5/29/2019   Target end date:   Indefinite    Indications    Atrial fibrillation (H) [I48.91] [I48.91]             Anticoagulation Episode Summary     INR check location:       Preferred lab:       Send INR reminders to:   [facility]    Comments:         Anticoagulation Care Providers     Provider Role Specialty Phone number    Tatianna Mota MD [facility] [phone]            See the Encounter Report to view Anticoagulation Flowsheet and Dosing Calendar (Go to Encounters tab in chart review, and find the Anticoagulation Therapy Visit)        Keren Webber RN

## 2019-05-02 ENCOUNTER — TELEPHONE (OUTPATIENT)
Dept: FAMILY MEDICINE | Facility: CLINIC | Age: 84
End: 2019-05-02

## 2019-05-02 NOTE — TELEPHONE ENCOUNTER
Called patient and informed needing to reschedule appointment. Patient was okay with rescheduling, writer then cancelled appointment then patient requested writer to call daughter to reschedule. Writer then called and left VM for daughter to reschedule appointment. Writer already cancelled appointment at this time. Please reschedule appointment for Hypertension follow up. Yarelis Monae MA

## 2019-05-28 DIAGNOSIS — Z79.01 LONG TERM CURRENT USE OF ANTICOAGULANT THERAPY: ICD-10-CM

## 2019-05-29 ENCOUNTER — ANTICOAGULATION THERAPY VISIT (OUTPATIENT)
Dept: NURSING | Facility: CLINIC | Age: 84
End: 2019-05-29
Payer: COMMERCIAL

## 2019-05-29 ENCOUNTER — TELEPHONE (OUTPATIENT)
Dept: FAMILY MEDICINE | Facility: CLINIC | Age: 84
End: 2019-05-29

## 2019-05-29 DIAGNOSIS — I48.91 ATRIAL FIBRILLATION, UNSPECIFIED TYPE (H): ICD-10-CM

## 2019-05-29 DIAGNOSIS — I48.91 ATRIAL FIBRILLATION, UNSPECIFIED TYPE (H): Primary | ICD-10-CM

## 2019-05-29 LAB — INR POINT OF CARE: 2.6 (ref 0.86–1.14)

## 2019-05-29 PROCEDURE — 36416 COLLJ CAPILLARY BLOOD SPEC: CPT

## 2019-05-29 PROCEDURE — 85610 PROTHROMBIN TIME: CPT | Mod: QW

## 2019-05-29 PROCEDURE — 99207 ZZC NO CHARGE NURSE ONLY: CPT

## 2019-05-29 RX ORDER — WARFARIN SODIUM 2 MG/1
TABLET ORAL
Qty: 102 TABLET | Refills: 0 | Status: SHIPPED | OUTPATIENT
Start: 2019-05-29 | End: 2019-07-16

## 2019-05-29 NOTE — PATIENT INSTRUCTIONS
[facility]:  Please call [phone] to cancel and/or reschedule your appointment   Please call [phone] with any problems or questions regarding your Warfarin therapy.

## 2019-05-29 NOTE — TELEPHONE ENCOUNTER
Has the patient previously taken warfarin? yes  If yes, for what indication? PAF    Does the patient have any of the following indications for a higher range of 2.5-3.5:    Mitral position mechanical valve? no    Rosey-Shiley, Ball and Cage or Monoleaflet valve (regardless of position) no    Other (if yes, please explain) no    Annual INR referral needed.  Orders teed up.    Keren Webber RN - [facility]

## 2019-05-29 NOTE — PROGRESS NOTES
ANTICOAGULATION FOLLOW-UP CLINIC VISIT    Patient Name:  Kelly Mansfield  Date:  2019  Contact Type:  Face to Face    SUBJECTIVE:  Patient Findings     Comments:   Bleeding Signs/Symptoms: NO  Thromboembolic Signs/Symptoms: NO     Medication Changes:  NO  Dietary Changes:  NO  Bacterial/Viral Infection: NO     Missed Coumadin Doses: NO  Other Concerns:  NO          Clinical Outcomes     Negatives:   Major bleeding event, Thromboembolic event, Anticoagulation-related hospital admission, Anticoagulation-related ED visit, Anticoagulation-related fatality    Comments:   Bleeding Signs/Symptoms: NO  Thromboembolic Signs/Symptoms: NO     Medication Changes:  NO  Dietary Changes:  NO  Bacterial/Viral Infection: NO     Missed Coumadin Doses: NO  Other Concerns:  NO             OBJECTIVE    INR Protime   Date Value Ref Range Status   2019 2.6 (A) 0.86 - 1.14 Final       ASSESSMENT / PLAN  No question data found.  Anticoagulation Summary  As of 2019    INR goal:   2.0-3.0   TTR:   66.4 % (1 y)   INR used for dosin.6 (2019)   Warfarin maintenance plan:   2 mg (2 mg x 1) every Mon; 3 mg (2 mg x 1.5) all other days   Full warfarin instructions:   2 mg every Mon; 3 mg all other days   Weekly warfarin total:   20 mg   No change documented:   Keren Webber RN   Plan last modified:   Keren Webber RN (3/11/2019)   Next INR check:   2019   Target end date:   Indefinite    Indications    Atrial fibrillation (H) [I48.91] [I48.91]             Anticoagulation Episode Summary     INR check location:       Preferred lab:       Send INR reminders to:   [facility]    Comments:         Anticoagulation Care Providers     Provider Role Specialty Phone number    Tatianna Mota MD [facility] [phone]            See the Encounter Report to view Anticoagulation Flowsheet and Dosing Calendar (Go to Encounters tab in chart review, and find the Anticoagulation Therapy  Visit)        Keren Webber RN

## 2019-06-01 ENCOUNTER — TRANSFERRED RECORDS (OUTPATIENT)
Dept: HEALTH INFORMATION MANAGEMENT | Facility: CLINIC | Age: 84
End: 2019-06-01

## 2019-06-04 ENCOUNTER — TRANSFERRED RECORDS (OUTPATIENT)
Dept: HEALTH INFORMATION MANAGEMENT | Facility: CLINIC | Age: 84
End: 2019-06-04

## 2019-06-04 ENCOUNTER — MEDICAL CORRESPONDENCE (OUTPATIENT)
Dept: HEALTH INFORMATION MANAGEMENT | Facility: CLINIC | Age: 84
End: 2019-06-04

## 2019-06-05 ENCOUNTER — RECORDS - HEALTHEAST (OUTPATIENT)
Dept: LAB | Facility: CLINIC | Age: 84
End: 2019-06-05

## 2019-06-05 LAB — INR PPP: 1.91 (ref 0.9–1.1)

## 2019-06-06 ENCOUNTER — RECORDS - HEALTHEAST (OUTPATIENT)
Dept: LAB | Facility: CLINIC | Age: 84
End: 2019-06-06

## 2019-06-07 ENCOUNTER — TRANSFERRED RECORDS (OUTPATIENT)
Dept: HEALTH INFORMATION MANAGEMENT | Facility: CLINIC | Age: 84
End: 2019-06-07

## 2019-06-07 LAB — INR PPP: 1.61 (ref 0.9–1.1)

## 2019-06-18 ENCOUNTER — RECORDS - HEALTHEAST (OUTPATIENT)
Dept: LAB | Facility: CLINIC | Age: 84
End: 2019-06-18

## 2019-06-18 LAB
ANION GAP SERPL CALCULATED.3IONS-SCNC: 8 MMOL/L (ref 5–18)
BUN SERPL-MCNC: 27 MG/DL (ref 8–28)
CALCIUM SERPL-MCNC: 9.9 MG/DL (ref 8.5–10.5)
CHLORIDE BLD-SCNC: 106 MMOL/L (ref 98–107)
CO2 SERPL-SCNC: 26 MMOL/L (ref 22–31)
CREAT SERPL-MCNC: 0.62 MG/DL (ref 0.6–1.1)
GFR SERPL CREATININE-BSD FRML MDRD: >60 ML/MIN/1.73M2
GLUCOSE BLD-MCNC: 84 MG/DL (ref 70–125)
POTASSIUM BLD-SCNC: 4.4 MMOL/L (ref 3.5–5)
SODIUM SERPL-SCNC: 140 MMOL/L (ref 136–145)

## 2019-06-26 ENCOUNTER — ANTICOAGULATION THERAPY VISIT (OUTPATIENT)
Dept: FAMILY MEDICINE | Facility: CLINIC | Age: 84
End: 2019-06-26

## 2019-06-26 DIAGNOSIS — I48.91 ATRIAL FIBRILLATION, UNSPECIFIED TYPE (H): ICD-10-CM

## 2019-07-01 ENCOUNTER — TELEPHONE (OUTPATIENT)
Dept: FAMILY MEDICINE | Facility: CLINIC | Age: 84
End: 2019-07-01

## 2019-07-01 ENCOUNTER — TRANSFERRED RECORDS (OUTPATIENT)
Dept: HEALTH INFORMATION MANAGEMENT | Facility: CLINIC | Age: 84
End: 2019-07-01

## 2019-07-01 LAB
ALT SERPL-CCNC: 17 IU/L (ref 8–45)
AST SERPL-CCNC: 16 IU/L (ref 2–40)
CREAT SERPL-MCNC: 0.68 MG/DL (ref 0.57–1.11)
GFR SERPL CREATININE-BSD FRML MDRD: >60 ML/MIN/1.73M2
GLUCOSE SERPL-MCNC: 88 MG/DL (ref 65–100)
INR PPP: 1
POTASSIUM SERPL-SCNC: 3.6 MMOL/L (ref 3.5–5)

## 2019-07-01 NOTE — TELEPHONE ENCOUNTER
Reason for call:  Other   Patient called regarding (reason for call): call back  Additional comments: Sharona from [facility] is calling requesting last visit note to be faxed over for patient. Also wondering when the last appt the patient had with her primary care provider was. Please call back.   Phone number to reach patient:  Other phone number:  [phone]    Best Time:  any    Can we leave a detailed message on this number?  YES

## 2019-07-01 NOTE — TELEPHONE ENCOUNTER
Called [facility] asked for fax # which is [phone]. This information was faxed to Antonio Gutierrez,

## 2019-07-02 ENCOUNTER — PATIENT OUTREACH (OUTPATIENT)
Dept: CARE COORDINATION | Facility: CLINIC | Age: 84
End: 2019-07-02

## 2019-07-02 DIAGNOSIS — Z76.89 HEALTH CARE HOME: Primary | ICD-10-CM

## 2019-07-02 NOTE — PROGRESS NOTES
Clinic Care Coordination Contact    Situation: Patient chart reviewed by care coordinator.    Left message on patient's voicemail requesting return call and advising she make a follow up PCP visit.     Background: 88 year old female seen in [facility] ED on 7/1/19 for abdominal pain    Assessment: Patient was found to have constipation with impaction. Patient received pink lady enema and was started on Miralax daily.     Patient had an incidental left lower lobe pulmonary nodule discovered.     Plan/Recommendations: Patient will return call to clinic care coordinator.  Patient will make PCP appointment in follow up.    Will not open to active care  coordination at this time.         Claire Hays RN, [facility] RN Coordinator  [facility]   7/2/2019    10:55 AM  [phone]

## 2019-07-05 ENCOUNTER — TELEPHONE (OUTPATIENT)
Dept: FAMILY MEDICINE | Facility: CLINIC | Age: 84
End: 2019-07-05

## 2019-07-05 NOTE — TELEPHONE ENCOUNTER
Called and left detailed message for Irena from [facility] and gave verbal OK for orders below.   Advised to call back RN Hotline [phone] if any questions.     Maria L Jenkins RN

## 2019-07-05 NOTE — TELEPHONE ENCOUNTER
Reason for call:  Home Healthcare Reason for Call:  Home Health Care        Orders are needed for this patient. verbal    PT: yes to eval, speech eval, home health aid for bathing assistance 1 time a week for 5 weeks.    OT: Yes to eval     Skilled Nursing: Yes for wound care 1 time a week for 1 week then 2 times a week for 4 weeks.     Pt Provider: Nakul    Phone Number Homecare Nurse can be reached at: [phone]    Can we leave a detailed message on this number? YES    Phone number patient can be reached at: na    Best Time: any    Call taken on 7/5/2019 at 12:57 PM by Salma Box

## 2019-07-08 ENCOUNTER — TRANSFERRED RECORDS (OUTPATIENT)
Dept: HEALTH INFORMATION MANAGEMENT | Facility: CLINIC | Age: 84
End: 2019-07-08

## 2019-07-08 LAB
CREAT SERPL-MCNC: 0.74 MG/DL (ref 0.57–1.11)
GFR SERPL CREATININE-BSD FRML MDRD: >60 ML/MIN/1.73M2
GLUCOSE SERPL-MCNC: 88 MG/DL (ref 65–100)
POTASSIUM SERPL-SCNC: 3.8 MMOL/L (ref 3.5–5)

## 2019-07-09 ENCOUNTER — TELEPHONE (OUTPATIENT)
Dept: FAMILY MEDICINE | Facility: CLINIC | Age: 84
End: 2019-07-09

## 2019-07-09 NOTE — TELEPHONE ENCOUNTER
Reason for Call: Request for an order or referral:    Order or referral being requested:  Order for home physical therapy. Needs verbal.     Date needed: as soon as possible    Has the patient been seen by the PCP for this problem? NO    Additional comments:  Na     Phone number Patient can be reached at:  Home number on file [phone] (home)    Best Time:   Any     Can we leave a detailed message on this number?  YES    Call taken on 7/9/2019 at 3:33 PM by Courtney Rahman

## 2019-07-09 NOTE — TELEPHONE ENCOUNTER
Called and left detailed message Andreina from [facility] and gave verbal OK for orders below.   Advised to call RN Hotline [phone] if any questions.     Maria L Jenkins RN

## 2019-07-09 NOTE — TELEPHONE ENCOUNTER
Called and spoke with Belen from [facility] and gave verbal OK for orders below.     Maria L Jenkins RN

## 2019-07-09 NOTE — TELEPHONE ENCOUNTER
Reason for call:  Other   Patient called regarding (reason for call): call back  Additional comments: Andreina from [facility] is calling to request orders for [employer]  Paste to be applied three times a day and PRN. Please call back and advise. Fax # [phone] (if needed)     Phone number to reach patient:  Other phone number:  [phone]    Best Time:  any    Can we leave a detailed message on this number?  YES

## 2019-07-10 ENCOUNTER — PATIENT OUTREACH (OUTPATIENT)
Dept: CARE COORDINATION | Facility: CLINIC | Age: 84
End: 2019-07-10

## 2019-07-10 DIAGNOSIS — J45.30 MILD PERSISTENT ASTHMA WITHOUT COMPLICATION: ICD-10-CM

## 2019-07-10 DIAGNOSIS — K59.00 CONSTIPATION: Primary | ICD-10-CM

## 2019-07-10 DIAGNOSIS — I10 HYPERTENSION WITH TARGET BLOOD PRESSURE GOAL UNDER 150/90: ICD-10-CM

## 2019-07-10 DIAGNOSIS — E78.5 HYPERLIPIDEMIA LDL GOAL <130: ICD-10-CM

## 2019-07-10 DIAGNOSIS — K59.09 CHRONIC CONSTIPATION: Primary | ICD-10-CM

## 2019-07-10 NOTE — PROGRESS NOTES
Clinic Care Coordination Contact  Care Team Conversations    Clinic care coordinator received a call from Patient's daughter, Michelle.     Michelle states that patient was again in ED ([facility] on 78/19) for constipation. She was again impacted. Michelle states they feel that the ED staff has been able to resolve the problem completely this time. She states her Mom is feeling much better.     Michelle states that her mother has now moved into an assisted living facility. She states she is now residing at [facility]. This is a private residential assisted living with only 7 patients. She states her mother is very happy, They provide 24/7 nursing staff, all meals and all medications administration. Currently her mother is receiving PT and OT from an outside agency.     Michelle did make a follow up visit with PCP for 7/16/19.     Michelle feels that her mothers needs are being met. Will not open to clinic care coordination at this time. Michelle has contact information for future questions or concerns.     Claire Hays RN, [facility] RN Coordinator  [facility]   7/10/2019    9:25 AM  [phone]

## 2019-07-10 NOTE — TELEPHONE ENCOUNTER
Furosemide 20mg    Last Written Prescription Date:  7/3/19  Last Fill Quantity: 30,   # refills: 0  Last Office Visit: 2/11/19  Future Office visit:    Next 5 appointments (look out 90 days)    Jul 16, 2019 12:40 PM CDT  Office Visit with Tatianna Mota MD  [facility] ([facility] [address] [facility] [facility] [address]  [phone]           Routing refill request to provider for review/approval because:  Drug not on the [facility], [facility] or [facility] refill protocol or controlled substance2    Lidocaine 5% patch  Last Written Prescription Date:  7/3/19  Last Fill Quantity: 30,   # refills: 0  Last Office Visit: 2/11/19  Future Office visit:    Next 5 appointments (look out 90 days)    Jul 16, 2019 12:40 PM CDT  Office Visit with Tatianna Mota MD  [facility] ([facility]) [address] [place] [address]  [place] [address]  [phone]           Routing refill request to provider for review/approval because:  Drug not on the [facility]    Acetaminophen 500mg tab      Last Written Prescription Date:  7/2/19  Last Fill Quantity: 210,   # refills: 0  Last Office Visit: 2/11/19  Future Office visit:    Next 5 appointments (look out 90 days)    Jul 16, 2019 12:40 PM CDT  Office Visit with Tatianna Mota MD  [facility] ([facility]) [address] [place] [address]  [place] [address]  [phone]           Routing refill request to provider for review/approval because:  Drug not on the [facility], [facility] or [facility] refill protocol or controlled substance    Aspirin 81mg chew      Last Written Prescription Date:  7/3/19  Last Fill Quantity: 30,   # refills: 0  Last Office Visit: 2/11/19  Future Office visit:    Next 5 appointments (look out 90 days)    Jul 16, 2019 12:40 PM CDT  Office [facility]  [facility] ([facility]) [address] [place] [address]  [place] [address]  [phone]           Routing refill request to  provider for review/approval because:  Drug not on the [facility] or [facility] refill protocol or controlled substance

## 2019-07-11 ENCOUNTER — MEDICAL CORRESPONDENCE (OUTPATIENT)
Dept: HEALTH INFORMATION MANAGEMENT | Facility: CLINIC | Age: 84
End: 2019-07-11

## 2019-07-12 NOTE — TELEPHONE ENCOUNTER
"Routing refill request to provider for review/approval because:  A break in medication (Dulera, albuterol)  Medication is reported/historical (miralax, metoprolol)   Labs not current: atorvastatin   Patient has appt scheduled for 7/16/19    Requested Prescriptions   Pending Prescriptions Disp Refills     polyethylene glycol (MIRALAX/GLYCOLAX) powder       Sig: Take 17 g (1 capful) by mouth daily       Laxatives Protocol Passed - 7/10/2019  9:22 AM        Passed - Patient is age 6 or older        Passed - Recent (12 mo) or future (30 days) visit within the authorizing provider's specialty     Patient had office visit in the last 12 months or has a visit in the next 30 days with authorizing provider or within the authorizing provider's specialty.  See \"Patient Info\" tab in inbasket, or \"Choose Columns\" in Meds & Orders section of the refill encounter.              Passed - Medication is active on med list        albuterol (PROAIR HFA/PROVENTIL HFA/VENTOLIN HFA) 108 (90 Base) MCG/ACT inhaler 1 Inhaler 6     Sig: Inhale 2 puffs into the lungs every 4 hours as needed       Asthma Maintenance Inhalers - Anticholinergics Failed - 7/10/2019  9:22 AM        Failed - Asthma control assessment score within normal limits in last 6 months     Please review ACT score.           Passed - Patient is age 12 years or older        Passed - Medication is active on med list        Passed - Recent (6 mo) or future (30 days) visit within the authorizing provider's specialty     Patient had office visit in the last 6 months or has a visit in the next 30 days with authorizing provider or within the authorizing provider's specialty.  See \"Patient Info\" tab in inbasket, or \"Choose Columns\" in Meds & Orders section of the refill encounter.            metoprolol tartrate (LOPRESSOR) 25 MG tablet 90 tablet 3     Sig: Take 0.5 tablets (12.5 mg) by mouth daily       Beta-Blockers Protocol Passed - 7/10/2019  9:22 AM        Passed - Blood pressure " "under 140/90 in past 12 months     BP Readings from Last 3 Encounters:   02/11/19 130/68   01/09/19 116/60   07/17/18 124/74                 Passed - Patient is age 6 or older        Passed - Recent (12 mo) or future (30 days) visit within the authorizing provider's specialty     Patient had office visit in the last 12 months or has a visit in the next 30 days with authorizing provider or within the authorizing provider's specialty.  See \"Patient Info\" tab in inbasket, or \"Choose Columns\" in Meds & Orders section of the refill encounter.              Passed - Medication is active on med list        atorvastatin (LIPITOR) 20 MG tablet 90 tablet 0     Sig: Take 1 tablet (20 mg) by mouth daily       Statins Protocol Failed - 7/10/2019  9:22 AM        Failed - LDL on file in past 12 months     Recent Labs   Lab Test 12/14/17  1122   LDL 70             Passed - No abnormal creatine kinase in past 12 months     No lab results found.             Passed - Recent (12 mo) or future (30 days) visit within the authorizing provider's specialty     Patient had office visit in the last 12 months or has a visit in the next 30 days with authorizing provider or within the authorizing provider's specialty.  See \"Patient Info\" tab in inbasket, or \"Choose Columns\" in Meds & Orders section of the refill encounter.              Passed - Medication is active on med list        Passed - Patient is age 18 or older        Passed - No active pregnancy on record        Passed - No positive pregnancy test in past 12 months        mometasone-formoterol (DULERA) 200-5 MCG/ACT inhaler 13 g 11       Inhaled Steroids Protocol Failed - 7/10/2019  9:22 AM        Failed - Asthma control assessment score within normal limits in last 6 months     Please review ACT score.           Passed - Patient is age 12 or older        Passed - Medication is active on med list        Passed - Recent (6 mo) or future (30 days) visit within the authorizing provider's " "specialty     Patient had office visit in the last 6 months or has a visit in the next 30 days with authorizing provider or within the authorizing provider's specialty.  See \"Patient Info\" tab in inbasket, or \"Choose Columns\" in Meds & Orders section of the refill encounter.            Maria L Jenkins RN  "

## 2019-07-12 NOTE — TELEPHONE ENCOUNTER
Routing refill request to provider for review/approval because:  Medication is reported/historical    Requested Prescriptions   Pending Prescriptions Disp Refills     Multiple Vitamins-Minerals (OCUVITE ADULT 50+) CAPS       Sig: Take by mouth daily       There is no refill protocol information for this order        Maria L Jenkins, RN

## 2019-07-12 NOTE — TELEPHONE ENCOUNTER
Ocuvite   Last Written Prescription Date:    Last Fill Quantity: ,   # refills:   Last Office Visit:   Future Office visit:    Next 5 appointments (look out 90 days)    Jul 16, 2019 12:40 PM CDT  Office Visit with Tatianna Mota MD  [facility] [place] ([facility]) [address]  [place] [address]  [phone]           Routing refill request to provider for review/approval because:  Medication is reported/historical

## 2019-07-15 RX ORDER — MV-MN/OM3/DHA/EPA/FISH/LUT/ZEA 250-5-1 MG
CAPSULE ORAL DAILY
OUTPATIENT
Start: 2019-07-15

## 2019-07-15 NOTE — TELEPHONE ENCOUNTER
"Forwarding request to Dr. Pace, \"eye vitamin\" recommended at visit on 08/13/19. Renetta Simental MA    "

## 2019-07-16 ENCOUNTER — OFFICE VISIT (OUTPATIENT)
Dept: FAMILY MEDICINE | Facility: CLINIC | Age: 84
End: 2019-07-16
Payer: COMMERCIAL

## 2019-07-16 ENCOUNTER — TELEPHONE (OUTPATIENT)
Dept: OPHTHALMOLOGY | Facility: CLINIC | Age: 84
End: 2019-07-16

## 2019-07-16 VITALS
BODY MASS INDEX: 27.71 KG/M2 | OXYGEN SATURATION: 96 % | RESPIRATION RATE: 16 BRPM | DIASTOLIC BLOOD PRESSURE: 46 MMHG | TEMPERATURE: 97.1 F | SYSTOLIC BLOOD PRESSURE: 90 MMHG | HEART RATE: 66 BPM | WEIGHT: 132 LBS | HEIGHT: 58 IN

## 2019-07-16 DIAGNOSIS — I48.0 PAROXYSMAL ATRIAL FIBRILLATION (H): ICD-10-CM

## 2019-07-16 DIAGNOSIS — M85.80 OSTEOPENIA, UNSPECIFIED LOCATION: ICD-10-CM

## 2019-07-16 DIAGNOSIS — J45.30 MILD PERSISTENT ASTHMA WITHOUT COMPLICATION: ICD-10-CM

## 2019-07-16 DIAGNOSIS — K59.00 CONSTIPATION, UNSPECIFIED CONSTIPATION TYPE: Primary | ICD-10-CM

## 2019-07-16 DIAGNOSIS — M48.061 SPINAL STENOSIS OF LUMBAR REGION, UNSPECIFIED WHETHER NEUROGENIC CLAUDICATION PRESENT: ICD-10-CM

## 2019-07-16 DIAGNOSIS — M51.369 DDD (DEGENERATIVE DISC DISEASE), LUMBAR: ICD-10-CM

## 2019-07-16 PROCEDURE — 99207 C PAF COMPLETED  NO CHARGE: CPT | Performed by: FAMILY MEDICINE

## 2019-07-16 PROCEDURE — 99214 OFFICE O/P EST MOD 30 MIN: CPT | Performed by: FAMILY MEDICINE

## 2019-07-16 RX ORDER — FUROSEMIDE 20 MG
20 TABLET ORAL DAILY
COMMUNITY

## 2019-07-16 RX ORDER — ASPIRIN 81 MG/1
81 TABLET ORAL DAILY
COMMUNITY

## 2019-07-16 RX ORDER — ATORVASTATIN CALCIUM 20 MG/1
20 TABLET, FILM COATED ORAL DAILY
Qty: 90 TABLET | Refills: 3 | Status: SHIPPED | OUTPATIENT
Start: 2019-07-16

## 2019-07-16 RX ORDER — ACETAMINOPHEN 500 MG
500-1000 TABLET ORAL EVERY 6 HOURS PRN
COMMUNITY
End: 2019-07-22

## 2019-07-16 RX ORDER — METOPROLOL TARTRATE 25 MG/1
12.5 TABLET, FILM COATED ORAL DAILY
Qty: 90 TABLET | Refills: 3 | Status: SHIPPED | OUTPATIENT
Start: 2019-07-16

## 2019-07-16 RX ORDER — POLYETHYLENE GLYCOL 3350 17 G/17G
1 POWDER, FOR SOLUTION ORAL DAILY
Qty: 1 BOTTLE | Refills: 3 | Status: SHIPPED | OUTPATIENT
Start: 2019-07-16

## 2019-07-16 RX ORDER — ALBUTEROL SULFATE 90 UG/1
2 AEROSOL, METERED RESPIRATORY (INHALATION) EVERY 4 HOURS PRN
Qty: 1 INHALER | Refills: 6 | Status: SHIPPED | OUTPATIENT
Start: 2019-07-16 | End: 2020-10-08

## 2019-07-16 ASSESSMENT — MIFFLIN-ST. JEOR: SCORE: 918.5

## 2019-07-16 NOTE — PROGRESS NOTES
Subjective     Kelly Mansfield is a 88 year old female who presents to clinic today for the following health issues:      Hospital Follow-up Visit:    Hospital/Nursing Home/IP Rehab Facility: [facility]  Date of Admission: 6-1-19  Date of Discharge: 6-4-19  Reason(s) for Admission: Fall  Ch atrial Fib  Back pain  Confusion            Problems taking medications regularly:  None       Medication changes since discharge: None       Problems adhering to non-medication therapy:  None    Summary of hospitalization:  CareEverywhere information obtained and reviewed  Diagnostic Tests/Treatments reviewed.  Follow up needed: none  Other Healthcare Providers Involved in Patient s Care:         None  Update since discharge: improved.     Post Discharge Medication Reconciliation: discharge medications reconciled, continue medications without change.  Plan of care communicated with patient     Coding guidelines for this visit:  Type of Medical   Decision Making Face-to-Face Visit       within 7 Days of discharge Face-to-Face Visit        within 14 days of discharge   Moderate Complexity [id number]   High Complexity [id number]              HPI   ED/UC Followup:    Facility:  [facility]  Date of visit: 07/08/19  Reason for visit: Constipation  Current Status: was seen in  on 7-1-19 and 7-8-18     Pt is doing much trae      Patient Active Problem List   Diagnosis     Osteopenia     Family history of malignant neoplasm of breast     Stiffness of joint, not elsewhere classified, forearm     Advanced directives, counseling/discussion     Pseudophakia, ou     Lumbar spinal stenosis     Diagnostic skin and sensitization tests     Nonallergic rhinitis     Mild persistent asthma     Health Care Home     History of depression     Sebaceous cyst     History of total knee replacement     TIA (transient ischemic attack)     Incomplete tear of left rotator cuff     Primary osteoarthritis of left shoulder     Posterior vitreous detachment,  bilateral     Macular degeneration, dry, mild, ou     Hypertension with target blood pressure goal under 150/90     History of TIA (transient ischemic attack) and stroke     Shoulder impingement, left     Grief     Hyperlipidemia LDL goal <100     Cerebrovascular disease     SBO (small bowel obstruction) (H)     H/O exploratory laparotomy     Paroxysmal atrial fibrillation (H)     Atrial fibrillation (H) [I48.91]     Loss of weight     Long term current use of anticoagulant therapy     Encounter for routine adult physical exam with abnormal findings     Accidental overdose     Past Surgical History:   Procedure Laterality Date     APPENDECTOMY  age 12     ARTHROSCOPY KNEE RT/LT      right     C NONSPECIFIC PROCEDURE  57    tailbone cyst removed     C NONSPECIFIC PROCEDURE      Vein surgery     C TOTAL KNEE ARTHROPLASTY Right 6/3/15     Carpal tunnel surgery Bilateral      CATARACT IOL, RT/LT       COLONOSCOPY       ENT SURGERY      Skin lesions     ENT SURGERY  12    Removal lesion back of neck     HYSTERECTOMY, LEYDA  10/02     HYSTERECTOMY, LEYDA      ca uterus     PHACOEMULSIFICATION WITH STANDARD INTRAOCULAR LENS IMPLANT  2008; 2009    right eye; left eye     SALPINGO OOPHORECTOMY,R/L/VAHE  10/02    Salpingo Oophorectomy/BILATERAL       Social History     Tobacco Use     Smoking status: Former Smoker     Packs/day: 0.50     Years: 25.00     Pack years: 12.50     Last attempt to quit: 1991     Years since quittin.5     Smokeless tobacco: Never Used   Substance Use Topics     Alcohol use: No     Alcohol/week: 0.0 oz     Family History   Problem Relation Age of Onset     Heart Disease Mother      Migraines Mother         unknown age     Unknown/Adopted Father      Unknown/Adopted Maternal Grandmother      Unknown/Adopted Maternal Grandfather      Unknown/Adopted Paternal Grandmother      Unknown/Adopted Paternal Grandfather      Diabetes Daughter      Breast Cancer Other         dads  side     Depression Son         committed suicide 3/2010     Macular Degeneration Paternal Aunt      Macular Degeneration Paternal Aunt      Glaucoma No family hx of          Current Outpatient Medications   Medication Sig Dispense Refill     acetaminophen (TYLENOL) 500 MG tablet Take 500-1,000 mg by mouth every 6 hours as needed for mild pain       albuterol (PROAIR HFA/PROVENTIL HFA/VENTOLIN HFA) 108 (90 BASE) MCG/ACT Inhaler Inhale 2 puffs into the lungs every 4 hours as needed 1 Inhaler 6     aspirin 81 MG EC tablet Take 81 mg by mouth daily       atorvastatin (LIPITOR) 20 MG tablet TAKE ONE TABLET BY MOUTH ONCE DAILY 90 tablet 0     DULERA 200-5 MCG/ACT oral inhaler INHALE TWO PUFFS BY MOUTH TWICE A DAY 13 g 11     furosemide (LASIX) 20 MG tablet Take 20 mg by mouth daily       Hyprom-Naphaz-Polysorb-Zn Sulf (CLEAR EYES COMPLETE OP) Apply 1 drop to eye as needed Both eyes.       Lidocaine-Menthol 4-5 % PTCH        OCUVITE OR 6mg daily       order for DME Equipment being ordered: colten Hose stockings 1 each 0     polyethylene glycol (MIRALAX/GLYCOLAX) powder Take 1 capful by mouth daily       UNABLE TO FIND 3 times daily MEDICATION NAME: Inzo Barrier Cream       No Known Allergies  Recent Labs   Lab Test 07/01/19 01/09/19  1236  12/14/17  1122 05/11/17  1018 10/24/16  0950  10/06/15  0931   LDL  --   --   --  70  --  86  --  84   HDL  --   --   --  84  --  72  --  55   TRIG  --   --   --  67  --  75  --  116   ALT 17 22  --   --  24  --   --   --    CR 0.68 0.64   < > 0.70 0.77 0.78   < >  --    GFRESTIMATED >60 80   < > 80 72 70   < >  --    GFRESTBLACK >60 >90   < > >90 87 84   < >  --    POTASSIUM 3.6 3.9   < > 4.0 4.0 4.5   < >  --    TSH  --  2.13  --   --  1.38  --   --   --     < > = values in this interval not displayed.      BP Readings from Last 3 Encounters:   07/16/19 90/46   02/11/19 130/68   01/09/19 116/60    Wt Readings from Last 3 Encounters:   07/16/19 59.9 kg (132 lb)   02/11/19 58.5 kg (129 lb)  "  01/09/19 59.9 kg (132 lb)                      Reviewed and updated as needed this visit by Provider          COPD Follow-Up    Overall, how are your COPD symptoms since your last clinic visit?   Stable     How much fatigue or shortness of breath do you have when you are walking?  None    How much shortness of breath do you have when you are resting?  None    How often do you cough? Never    Have you noticed any change in your sputum/phlegm?  No    Have you experienced a recent fever? No    Please describe how far you can walk without stopping to rest:  Less than 10 feet    How many flights of stairs are you able to walk up without stopping?  Does not    Have you had any Emergency Room Visits, Urgent Care Visits, or Hospital Admissions because of your COPD since your last office visit? For other reasons as above    Due to her frequent falls coumadin was stopped in ER    Hospital notes reviewed     History   Smoking Status     Former Smoker     Packs/day: 0.50     Years: 25.00     Quit date: 1/1/1991   Smokeless Tobacco     Never Used     Lab Results   Component Value Date    FEV1 109 05/05/2015    [id number] 79 05/01/2014     Problems taking medications regularly: No    Medication side effects: none    Diet: low salt          Review of Systems   ROS COMP: CONSTITUTIONAL: NEGATIVE for fever, chills, change in weight  ENT/MOUTH: NEGATIVE for ear, mouth and throat problems  RESP: NEGATIVE for significant cough or SOB  CV: NEGATIVE for chest pain, palpitations or peripheral edema  GI: NEGATIVE for nausea, abdominal pain, heartburn, or change in bowel habits  MUSCULOSKELETAL: NEGATIVE for significant arthralgias or myalgia  PSYCHIATRIC: NEGATIVE for changes in mood or affect  ROS otherwise negative      Objective    BP 90/46   Pulse 66   Temp 97.1  F (36.2  C) (Oral)   Resp 16   Ht 1.473 m (4' 10\")   Wt 59.9 kg (132 lb)   SpO2 96%   Breastfeeding? No   BMI 27.59 kg/m    Body mass index is 27.59 kg/m .  Physical " "Exam   GENERAL: healthy, alert and no distress  EYES: Eyes grossly normal to inspection, PERRL and conjunctivae and sclerae normal  NECK: no adenopathy, no asymmetry, masses, or scars and thyroid normal to palpation  RESP: lungs clear to auscultation - no rales, rhonchi or wheezes  CV: regular rate and rhythm, normal S1 S2, no S3 or S4, no murmur, click or rub, no peripheral edema and peripheral pulses strong  ABDOMEN: soft, nontender, no hepatosplenomegaly, no masses and bowel sounds normal  MS: no gross musculoskeletal defects noted, no edema  SKIN: no suspicious lesions or rashes  PSYCH: mentation appears normal    Diagnostic Test Results:  Labs reviewed in Epic        Assessment & Plan     1. Constipation, unspecified constipation type  Pt is on miralax and doing well    2. DDD (degenerative disc disease), lumbar  Stable     3. Osteopenia, unspecified location  . Take Vitamin D 1000 units daily with Calcium 1200 mg daily.      4. Mild persistent asthma without complication  controlled    5. Spinal stenosis of lumbar region, unspecified whether neurogenic claudication present  Stable      6 PAF-coumadin was stopped due  Falls  Advised stp Metoprolol as BP is Low  She will check her BP at assisted Living tomorrow and call me  Follow up 1 week  Go to ER if she feels Tired or BP is still Low-less than 90/40  BMI:   Estimated body mass index is 27.59 kg/m  as calculated from the following:    Height as of this encounter: 1.473 m (4' 10\").    Weight as of this encounter: 59.9 kg (132 lb).               Return in about 1 week (around 7/23/2019) for BP Recheck.    Tatianna Mota MD  [facility]        "

## 2019-07-16 NOTE — PATIENT INSTRUCTIONS
Please stop Metoprolol  Check Blood Pressure and call me results  Follow up 1 week  Tatianna Mota MD

## 2019-07-16 NOTE — LETTER
My Asthma Action Plan  Name: Kelly Mansfield   YOB: 1931  Date: 7/16/2019   My doctor: Tatianna Mota MD   My clinic: [facility]        My Control Medicine: dulera  My Rescue Medicine: Albuterol (Proair/Ventolin/Proventil) inhaler seee med sheets   My Asthma Severity: mild persistent  Avoid your asthma triggers: upper respiratory infections  upper respiratory infections            GREEN ZONE   Good Control    I feel good    No cough or wheeze    Can work, sleep and play without asthma symptoms       Take your asthma control medicine every day.     1. If exercise triggers your asthma, take your rescue medication    15 minutes before exercise or sports, and    During exercise if you have asthma symptoms  2. Spacer to use with inhaler: If you have a spacer, make sure to use it with your inhaler             YELLOW ZONE Getting Worse  I have ANY of these:    I do not feel good    Cough or wheeze    Chest feels tight    Wake up at night   1. Keep taking your Green Zone medications  2. Start taking your rescue medicine:    every 20 minutes for up to 1 hour. Then every 4 hours for 24-48 hours.  3. If you stay in the Yellow Zone for more than 12-24 hours, contact your doctor.  4. If you do not return to the Green Zone in 12-24 hours or you get worse, start taking your oral steroid medicine if prescribed by your provider.           RED ZONE Medical Alert - Get Help  I have ANY of these:    I feel awful    Medicine is not helping    Breathing getting harder    Trouble walking or talking    Nose opens wide to breathe       1. Take your rescue medicine NOW  2. If your provider has prescribed an oral steroid medicine, start taking it NOW  3. Call your doctor NOW  4. If you are still in the Red Zone after 20 minutes and you have not reached your doctor:    Take your rescue medicine again and    Call 911 or go to the emergency room right away    See your regular doctor within 2 weeks of an Emergency  Room or Urgent Care visit for follow-up treatment.          Annual Reminders:  Meet with Asthma Educator,  Flu Shot in the Fall, consider Pneumonia Vaccination for patients with asthma (aged 19 and older).    Pharmacy: [facility] - [address]                      Asthma Triggers  How To Control Things That Make Your Asthma Worse    Triggers are things that make your asthma worse.  Look at the list below to help you find your triggers and what you can do about them.  You can help prevent asthma flare-ups by staying away from your triggers.      Trigger                                                          What you can do   Cigarette Smoke  Tobacco smoke can make asthma worse. Do not allow smoking in your home, car or around you.  Be sure no one smokes at a child s day care or school.  If you smoke, ask your health care provider for ways to help you quit.  Ask family members to quit too.  Ask your health care provider for a referral to Quit Plan to help you quit smoking, or call 5-974-435-PLAN.     Colds, Flu, Bronchitis  These are common triggers of asthma. Wash your hands often.  Don t touch your eyes, nose or mouth.  Get a flu shot every year.     Dust Mites  These are tiny bugs that live in cloth or carpet. They are too small to see. Wash sheets and blankets in hot water every week.   Encase pillows and mattress in dust mite proof covers.  Avoid having carpet if you can. If you have carpet, vacuum weekly.   Use a dust mask and HEPA vacuum.   Pollen and Outdoor Mold  Some people are allergic to trees, grass, or weed pollen, or molds. Try to keep your windows closed.  Limit time out doors when pollen count is high.   Ask you health care provider about taking medicine during allergy season.     Animal Dander  Some people are allergic to skin flakes, urine or saliva from pets with fur or feathers. Keep pets with fur or feathers out of your home.    If you can t keep the pet  outdoors, then keep the pet out of your bedroom.  Keep the bedroom door closed.  Keep pets off cloth furniture and away from stuffed toys.     Mice, Rats, and Cockroaches  Some people are allergic to the waste from these pests.   Cover food and garbage.  Clean up spills and food crumbs.  Store grease in the refrigerator.   Keep food out of the bedroom.   Indoor Mold  This can be a trigger if your home has high moisture. Fix leaking faucets, pipes, or other sources of water.   Clean moldy surfaces.  Dehumidify basement if it is damp and smelly.   Smoke, Strong Odors, and Sprays  These can reduce air quality. Stay away from strong odors and sprays, such as perfume, powder, hair spray, paints, smoke incense, paint, cleaning products, candles and new carpet.   Exercise or Sports  Some people with asthma have this trigger. Be active!  Ask your doctor about taking medicine before sports or exercise to prevent symptoms.    Warm up for 5-10 minutes before and after sports or exercise.     Other Triggers of Asthma  Cold air:  Cover your nose and mouth with a scarf.  Sometimes laughing or crying can be a trigger.  Some medicines and food can trigger asthma.

## 2019-07-17 ASSESSMENT — ASTHMA QUESTIONNAIRES: ACT_TOTALSCORE: 25

## 2019-07-19 ENCOUNTER — DOCUMENTATION ONLY (OUTPATIENT)
Dept: OTHER | Facility: CLINIC | Age: 84
End: 2019-07-19

## 2019-07-22 ENCOUNTER — TELEPHONE (OUTPATIENT)
Dept: FAMILY MEDICINE | Facility: CLINIC | Age: 84
End: 2019-07-22

## 2019-07-22 RX ORDER — ACETAMINOPHEN 500 MG
1000 TABLET ORAL EVERY 8 HOURS PRN
COMMUNITY
Start: 2019-07-22

## 2019-07-22 NOTE — TELEPHONE ENCOUNTER
Reviewed [facility] med list and reconciled against Epic med list    Discrepancies noted-  1. Albuterol 108 mcg/act inhaler - listed on Epic MAR, not on  MAR  2. Miralax - listed as 1 x daily on Epic MAR, listed as daily PRN on  MAR  3. Clear eyes complete OP - listed on Epic MAR, not on  MAR  4. Inzo barrier cream - listed on Epic MAR, not on  MAR     Routing to provider-  Please review Plan of Care AND med list, make any changes appropriate, and sign plan of care to be faxed back to [facility]    Maria L Bocanegra RN

## 2019-07-22 NOTE — TELEPHONE ENCOUNTER
Prior Authorization Retail Medication Request    Medication/Dose: Lidocaine 5% patch  ICD code (if different than what is on RX):  Unknown   Previously Tried and Failed:  Lidocaine 5% ointment  Rationale:  ?    Insurance Name:  [employer]  Insurance ID: [id number]       Pharmacy Information (if different than what is on RX)  Name:  [facility]  Phone:  [phone]

## 2019-07-22 NOTE — TELEPHONE ENCOUNTER
Reason for Call:  Form, our goal is to have forms completed with 72 hours, however, some forms may require a visit or additional information.    Type of letter, form or note:  Home Health Certification    Who is the form from?: Home care    Where did the form come from: form was faxed in    What clinic location was the form placed at?: [facility]    Where the form was placed: Given to MA/RN    What number is listed as a contact on the form?: [phone]       Kelley Barney  Team Brenda,       Call taken on 7/22/2019 at 2:58 PM by Kelley Barney

## 2019-07-23 NOTE — TELEPHONE ENCOUNTER
Called and spoke with [facility].   Received 2 numbers for to try and contact for med rec.   [facility] - [phone]   Irena - [phone]    Left message for Irena KIM from [facility] to call RN hotline [phone].     Maria L Bocanegra RN

## 2019-07-25 ENCOUNTER — TELEPHONE (OUTPATIENT)
Dept: FAMILY MEDICINE | Facility: CLINIC | Age: 84
End: 2019-07-25

## 2019-07-25 NOTE — TELEPHONE ENCOUNTER
Reason for call:  Other   Patient called regarding (reason for call): call back  Additional comments: Naomi with [facility] is calling because the patient has a stage 2 ulcer and needs a relief pressure cushion. [facility] medical needs written evidence on why it is needed. Please fax to [phone] [facility] medical. Any questions please call back      Phone number to reach patient:  Other phone number:  [phone]    Best Time:  any    Can we leave a detailed message on this number?  YES

## 2019-07-25 NOTE — TELEPHONE ENCOUNTER
MIRANDA Salgado ([facility]-OT Lymphedema Therapist) that patient would need to schedule an appointment with Dr. Mota to have the proper documentation done for the pressure cushion. Maddy Larson,

## 2019-07-25 NOTE — TELEPHONE ENCOUNTER
Routing to Dr. Mota to see if she can addend last visit from 7-16-19, to document reason for pressure cushion, etc.  Maddy Larson,

## 2019-07-30 ENCOUNTER — TELEPHONE (OUTPATIENT)
Dept: FAMILY MEDICINE | Facility: CLINIC | Age: 84
End: 2019-07-30

## 2019-07-30 NOTE — TELEPHONE ENCOUNTER
Reason for Call:  Form, our goal is to have forms completed with 72 hours, however, some forms may require a visit or additional information.    Type of letter, form or note:  Home Health Certification    Who is the form from?: Home care    Where did the form come from: form was faxed in    What clinic location was the form placed at?: [facility]    Where the form was placed: Given to MA/RN    What number is listed as a contact on the form?: [phone]    Kelley Barney  Team Brenda,              Call taken on 7/30/2019 at 11:03 AM by Kelley Barney

## 2019-07-30 NOTE — TELEPHONE ENCOUNTER
Central Prior Authorization Team   Phone: [phone]      PA NOT NEEDED    Medication: Lidocaine 5% patch-PA NOT NEEDED  Insurance Company:    Pharmacy Filling the Rx: [facility] [place], [place] - [address] [place] [address]  Filling Pharmacy Phone: [phone]  Filling Pharmacy Fax:    Start Date: 7/30/2019    Called pharmacy to verify which medication is requiring a PA.  The most recent prescription pharmacy has on file is from Piotr Irving.  He is not a [facility] provider, pharmacy will reach out to his clinic.

## 2019-07-30 NOTE — TELEPHONE ENCOUNTER
Reviewed [facility] med list and reconciled against Epic med list    Discrepancies noted-  Albuterol inhaler - listed on Epic, not on HC  Clear eyes complete OP - listed on Epic, not on HC  Inzo barrier cream - listed on Epic, not on HC    Routing to provider-  Please review Plan of Care AND med list, make any changes appropriate, and sign plan of care to be faxed back to [facility]    Maria L Bocanegra RN

## 2019-07-30 NOTE — TELEPHONE ENCOUNTER
Called and spoke with Irena from [facility]  She requests we call her  Andreina at [phone] to discuss medications.    Left message for Andreina to call RN hotline [phone].     Maria L Bocanegra RN

## 2019-08-12 PROBLEM — L89.150 PRESSURE INJURY OF SACRAL REGION, UNSTAGEABLE (H): Status: ACTIVE | Noted: 2019-08-12

## 2019-08-12 NOTE — TELEPHONE ENCOUNTER
Attempted to call Andreina from [facility]  but  states she is not available and do not leave . Call Chidi Lewis at [phone].    Attempted to call Chidi KIM from [facility]  but no answer &  box is full.   Try again later.    Maria L Bocanegra, RN

## 2019-08-14 NOTE — TELEPHONE ENCOUNTER
Called and spoke with Chidi, states she does not set up the medications and should call [facility] KIM at [phone].    Called and spoke with [facility]. Reports she does not set up her medications as they're set up by [facility] and should call [phone].    Called and spoke with [facility] facility. States that Reyna KIM sets up her medication but she is not in right now. Will have Reyna call RN hotline back when she gets in.    Maria L Bocanegra, RN

## 2019-08-20 NOTE — TELEPHONE ENCOUNTER
Reyna returned call to RN Hotline and left .     Called and spoke with Reyna. She reports that patient's care has been transferred over to [facility] and should have been faxed to them instead of Dr. Mota.      Transferred care [facility]   Fax #: [phone]    Forms were faxed back to [facility] and [facility].    Maria L Bocanegra RN

## 2019-08-21 DIAGNOSIS — Z53.9 DIAGNOSIS NOT YET DEFINED: Primary | ICD-10-CM

## 2019-08-21 PROCEDURE — G0180 MD CERTIFICATION HHA PATIENT: HCPCS | Performed by: FAMILY MEDICINE

## 2020-07-30 DIAGNOSIS — J45.30 MILD PERSISTENT ASTHMA WITHOUT COMPLICATION: ICD-10-CM

## 2020-08-01 RX ORDER — MOMETASONE FUROATE AND FORMOTEROL FUMARATE DIHYDRATE 200; 5 UG/1; UG/1
AEROSOL RESPIRATORY (INHALATION)
Qty: 13 G | Refills: 0 | Status: SHIPPED | OUTPATIENT
Start: 2020-08-01

## 2020-08-02 NOTE — TELEPHONE ENCOUNTER
Medication is being filled for 1 time refill only due to:  Patient needs to be seen because it has been more than one year since last visit.   Please call to schedule appt.

## 2020-08-03 NOTE — TELEPHONE ENCOUNTER
Per family patient is in an assistant living and is being followed by another provider. No other information provided. Alyssa Oh,        DULERA 200-5 MCG/ACT inhaler               Sig: INHALE 2 PUFFS BY MOUTH 2 TIMES A DAY.     Disp:  13 g    Refills:  0     Start: 8/1/2020     Class: E-Prescribe     Authorized by: Kenna Law RN     For: Mild persistent asthma without complication     To pharmacy: Medication is being filled for 1 time refill only due to:  Patient needs to be seen because it has been more than one year since last visit.         To be filled at: [facility] [place], [place] - [address] [facility] [address]

## 2020-10-07 DIAGNOSIS — J45.30 MILD PERSISTENT ASTHMA WITHOUT COMPLICATION: ICD-10-CM

## 2020-10-08 RX ORDER — ALBUTEROL SULFATE 90 UG/1
AEROSOL, METERED RESPIRATORY (INHALATION)
Qty: 18 G | Refills: 0 | Status: SHIPPED | OUTPATIENT
Start: 2020-10-08

## 2020-10-08 NOTE — TELEPHONE ENCOUNTER
Routing refill request to provider for review/approval because:  Patient needs to be seen because it has been more than 1 year since last office visit.    Briseyda Cui BSN, RN